# Patient Record
Sex: FEMALE | Race: WHITE | NOT HISPANIC OR LATINO | URBAN - NONMETROPOLITAN AREA
[De-identification: names, ages, dates, MRNs, and addresses within clinical notes are randomized per-mention and may not be internally consistent; named-entity substitution may affect disease eponyms.]

---

## 2019-01-10 ENCOUNTER — OFFICE (OUTPATIENT)
Dept: URBAN - NONMETROPOLITAN AREA CLINIC 13 | Facility: CLINIC | Age: 64
End: 2019-01-10
Payer: COMMERCIAL

## 2019-01-10 VITALS
DIASTOLIC BLOOD PRESSURE: 58 MMHG | WEIGHT: 193 LBS | HEART RATE: 62 BPM | OXYGEN SATURATION: 93 % | SYSTOLIC BLOOD PRESSURE: 118 MMHG | HEIGHT: 64 IN

## 2019-01-10 VITALS — HEIGHT: 64 IN

## 2019-01-10 DIAGNOSIS — K57.90 DIVERTICULOSIS OF INTESTINE, PART UNSPECIFIED, WITHOUT PERFO: ICD-10-CM

## 2019-01-10 DIAGNOSIS — K76.0 FATTY (CHANGE OF) LIVER, NOT ELSEWHERE CLASSIFIED: ICD-10-CM

## 2019-01-10 DIAGNOSIS — R93.3 ABNORMAL FINDINGS ON DIAGNOSTIC IMAGING OF OTHER PARTS OF DI: ICD-10-CM

## 2019-01-10 DIAGNOSIS — Z01.812 ENCOUNTER FOR PREPROCEDURAL LABORATORY EXAMINATION: ICD-10-CM

## 2019-01-10 LAB
CBC SYSMEX: HEMATOCRIT: 38.3 % (ref 37–47)
CBC SYSMEX: HEMOGLOBIN: 13.4 G/DL (ref 12–14)
CBC SYSMEX: LYMPHS %: 47.2 % — HIGH (ref 20.5–40.5)
CBC SYSMEX: LYMPHS ABSOLUTE: 3.9 10*3U/L — HIGH (ref 1.3–2.9)
CBC SYSMEX: MCH: 30.3 PG (ref 27–32)
CBC SYSMEX: MCHC: 35 G/DL (ref 28.5–35)
CBC SYSMEX: MCV: 86.7 FL (ref 84–100)
CBC SYSMEX: MONOS %: 7.3 % (ref 2–10)
CBC SYSMEX: MONOS ABSOLUTE: 0.6 10*3U/L (ref 0.3–3.8)
CBC SYSMEX: MPV: 10.8 FL (ref 8.3–11.9)
CBC SYSMEX: NEUT. %: 45.5 % (ref 43–67)
CBC SYSMEX: NEUT. ABSOLUTE: 3.7 10*3U/L (ref 2.2–4.8)
CBC SYSMEX: PLATELETS: 268 10*3U/L (ref 130–440)
CBC SYSMEX: RBC: 4.4 10*6U/L (ref 4.2–5.4)
CBC SYSMEX: RDW: 12.3 % (ref 11.5–15.5)
CBC SYSMEX: WBC: 8.2 10*3U/L (ref 4.5–10.5)
COMPLETE METABOLIC: ALBUMIN: 4.9 G/DL (ref 3.5–5.2)
COMPLETE METABOLIC: ALK. PHOS: 91 U/L (ref 40–150)
COMPLETE METABOLIC: ALT: 28 U/L (ref 0–55)
COMPLETE METABOLIC: AST: 22 U/L (ref 5–34)
COMPLETE METABOLIC: BUN: 18 MG/DL (ref 7–26)
COMPLETE METABOLIC: CALCIUM: 10.1 MG/DL (ref 8.4–10.3)
COMPLETE METABOLIC: CHLORIDE: 101 MMOL/L (ref 98–107)
COMPLETE METABOLIC: CO2: 28 MEQ/L (ref 22–29)
COMPLETE METABOLIC: CREATININE: 0.62 MG/DL (ref 0.57–1.25)
COMPLETE METABOLIC: GFR - AFRICAN AMERICAN: 125.6 (ref 59–200)
COMPLETE METABOLIC: GFR - NON AFRICAN AMERICAN: 103.7 (ref 59–200)
COMPLETE METABOLIC: GLUCOSE: 89 MG/DL (ref 70–99)
COMPLETE METABOLIC: POTASSIUM: 4.2 MMOL/L (ref 3.5–5.3)
COMPLETE METABOLIC: SODIUM: 141 MMOL/L (ref 136–145)
COMPLETE METABOLIC: TOTAL BILIRUBIN: 0.6 MG/DL (ref 0.2–1.2)
COMPLETE METABOLIC: TOTAL PROTEIN: 7.2 G/DL (ref 6.4–8.3)

## 2019-01-10 PROCEDURE — 80053 COMPREHEN METABOLIC PANEL: CPT | Performed by: NURSE PRACTITIONER

## 2019-01-10 PROCEDURE — 85025 COMPLETE CBC W/AUTO DIFF WBC: CPT | Performed by: NURSE PRACTITIONER

## 2019-01-10 PROCEDURE — 99203 OFFICE O/P NEW LOW 30 MIN: CPT | Performed by: NURSE PRACTITIONER

## 2019-01-10 PROCEDURE — 36415 COLL VENOUS BLD VENIPUNCTURE: CPT | Performed by: NURSE PRACTITIONER

## 2019-01-10 RX ORDER — VITAMIN E 268 MG
CAPSULE ORAL
Qty: 180 | Refills: 1 | Status: ACTIVE
Start: 2019-01-10

## 2019-01-10 RX ORDER — POLYETHYLENE GLYCOL 3350, SODIUM SULFATE ANHYDROUS, SODIUM BICARBONATE, SODIUM CHLORIDE, POTASSIUM CHLORIDE 236; 22.74; 6.74; 5.86; 2.97 G/4L; G/4L; G/4L; G/4L; G/4L
POWDER, FOR SOLUTION ORAL
Qty: 1 | Refills: 0 | Status: COMPLETED
Start: 2019-01-10 | End: 2019-01-25

## 2019-01-10 RX ORDER — BISACODYL 5 MG
TABLET, DELAYED RELEASE (ENTERIC COATED) ORAL
Qty: 8 | Refills: 0 | Status: COMPLETED
Start: 2019-01-10 | End: 2019-01-25

## 2019-01-10 RX ORDER — ONDANSETRON HYDROCHLORIDE 4 MG/1
TABLET, FILM COATED ORAL
Qty: 2 | Refills: 0 | Status: COMPLETED
Start: 2019-01-10 | End: 2019-01-25

## 2019-01-25 ENCOUNTER — OFFICE (OUTPATIENT)
Dept: URBAN - NONMETROPOLITAN AREA CLINIC 13 | Facility: CLINIC | Age: 64
End: 2019-01-25
Payer: COMMERCIAL

## 2019-01-25 ENCOUNTER — AMBULATORY SURGICAL CENTER (OUTPATIENT)
Dept: URBAN - NONMETROPOLITAN AREA SURGERY 2 | Facility: SURGERY | Age: 64
End: 2019-01-25

## 2019-01-25 VITALS
RESPIRATION RATE: 189 BRPM | OXYGEN SATURATION: 100 % | SYSTOLIC BLOOD PRESSURE: 147 MMHG | DIASTOLIC BLOOD PRESSURE: 69 MMHG | SYSTOLIC BLOOD PRESSURE: 136 MMHG | SYSTOLIC BLOOD PRESSURE: 116 MMHG | DIASTOLIC BLOOD PRESSURE: 81 MMHG | HEART RATE: 52 BPM | OXYGEN SATURATION: 98 % | DIASTOLIC BLOOD PRESSURE: 80 MMHG | OXYGEN SATURATION: 96 % | OXYGEN SATURATION: 94 % | DIASTOLIC BLOOD PRESSURE: 75 MMHG | HEART RATE: 55 BPM | SYSTOLIC BLOOD PRESSURE: 130 MMHG | OXYGEN SATURATION: 97 % | HEART RATE: 56 BPM | SYSTOLIC BLOOD PRESSURE: 126 MMHG | HEIGHT: 64 IN | OXYGEN SATURATION: 99 % | DIASTOLIC BLOOD PRESSURE: 70 MMHG | OXYGEN SATURATION: 92 % | SYSTOLIC BLOOD PRESSURE: 142 MMHG | HEART RATE: 50 BPM | DIASTOLIC BLOOD PRESSURE: 71 MMHG | DIASTOLIC BLOOD PRESSURE: 78 MMHG | HEART RATE: 54 BPM | SYSTOLIC BLOOD PRESSURE: 123 MMHG | HEART RATE: 58 BPM | SYSTOLIC BLOOD PRESSURE: 113 MMHG | DIASTOLIC BLOOD PRESSURE: 66 MMHG | RESPIRATION RATE: 20 BRPM | HEART RATE: 53 BPM | SYSTOLIC BLOOD PRESSURE: 129 MMHG | WEIGHT: 193 LBS | DIASTOLIC BLOOD PRESSURE: 91 MMHG | OXYGEN SATURATION: 95 % | DIASTOLIC BLOOD PRESSURE: 82 MMHG | RESPIRATION RATE: 18 BRPM

## 2019-01-25 DIAGNOSIS — D12.5 BENIGN NEOPLASM OF SIGMOID COLON: ICD-10-CM

## 2019-01-25 DIAGNOSIS — K63.5 POLYP OF COLON: ICD-10-CM

## 2019-01-25 DIAGNOSIS — Z12.11 ENCOUNTER FOR SCREENING FOR MALIGNANT NEOPLASM OF COLON: ICD-10-CM

## 2019-01-25 PROBLEM — K64.1 SECOND DEGREE HEMORRHOIDS: Status: ACTIVE | Noted: 2019-01-25

## 2019-01-25 PROBLEM — K57.30 DVRTCLOS OF LG INT W/O PERFORATION OR ABSCESS W/O BLEEDING: Status: ACTIVE | Noted: 2019-01-25

## 2019-01-25 PROCEDURE — 88305 TISSUE EXAM BY PATHOLOGIST: CPT | Mod: TC | Performed by: INTERNAL MEDICINE

## 2019-01-25 PROCEDURE — 45380 COLONOSCOPY AND BIOPSY: CPT | Mod: PT | Performed by: INTERNAL MEDICINE

## 2019-01-30 LAB — SURGICAL PROCEDURE: PDFREPORT1: (no result)

## 2019-05-24 ENCOUNTER — OFFICE (OUTPATIENT)
Dept: URBAN - NONMETROPOLITAN AREA CLINIC 13 | Facility: CLINIC | Age: 64
End: 2019-05-24
Payer: COMMERCIAL

## 2019-05-24 VITALS
OXYGEN SATURATION: 96 % | HEIGHT: 64 IN | SYSTOLIC BLOOD PRESSURE: 126 MMHG | DIASTOLIC BLOOD PRESSURE: 78 MMHG | HEART RATE: 51 BPM | WEIGHT: 186 LBS

## 2019-05-24 VITALS — HEIGHT: 64 IN

## 2019-05-24 DIAGNOSIS — K76.0 FATTY (CHANGE OF) LIVER, NOT ELSEWHERE CLASSIFIED: ICD-10-CM

## 2019-05-24 DIAGNOSIS — K57.90 DIVERTICULOSIS OF INTESTINE, PART UNSPECIFIED, WITHOUT PERFO: ICD-10-CM

## 2019-05-24 PROCEDURE — 93976 VASCULAR STUDY: CPT | Mod: TC,59 | Performed by: NURSE PRACTITIONER

## 2019-05-24 PROCEDURE — 93976 VASCULAR STUDY: CPT | Mod: 59,TC | Performed by: NURSE PRACTITIONER

## 2019-05-24 PROCEDURE — 99213 OFFICE O/P EST LOW 20 MIN: CPT | Mod: 25 | Performed by: NURSE PRACTITIONER

## 2019-05-24 PROCEDURE — 76705 ECHO EXAM OF ABDOMEN: CPT | Mod: TC | Performed by: NURSE PRACTITIONER

## 2019-06-07 ENCOUNTER — OFFICE (OUTPATIENT)
Dept: URBAN - NONMETROPOLITAN AREA CLINIC 13 | Facility: CLINIC | Age: 64
End: 2019-06-07

## 2019-06-07 ENCOUNTER — OFFICE (OUTPATIENT)
Dept: URBAN - NONMETROPOLITAN AREA CLINIC 13 | Facility: CLINIC | Age: 64
End: 2019-06-07
Payer: COMMERCIAL

## 2019-06-07 VITALS — HEIGHT: 64 IN

## 2019-06-07 DIAGNOSIS — Z01.812 ENCOUNTER FOR PREPROCEDURAL LABORATORY EXAMINATION: ICD-10-CM

## 2019-06-07 DIAGNOSIS — K76.0 FATTY (CHANGE OF) LIVER, NOT ELSEWHERE CLASSIFIED: ICD-10-CM

## 2019-06-07 PROCEDURE — 36415 COLL VENOUS BLD VENIPUNCTURE: CPT | Performed by: NURSE PRACTITIONER

## 2019-06-07 PROCEDURE — 82565 ASSAY OF CREATININE: CPT | Performed by: NURSE PRACTITIONER

## 2019-06-07 PROCEDURE — 84520 ASSAY OF UREA NITROGEN: CPT | Performed by: NURSE PRACTITIONER

## 2019-06-07 PROCEDURE — 74170 CT ABD WO CNTRST FLWD CNTRST: CPT | Mod: TC | Performed by: NURSE PRACTITIONER

## 2019-11-26 ENCOUNTER — OFFICE (OUTPATIENT)
Dept: URBAN - NONMETROPOLITAN AREA CLINIC 13 | Facility: CLINIC | Age: 64
End: 2019-11-26

## 2019-11-26 VITALS
HEART RATE: 55 BPM | DIASTOLIC BLOOD PRESSURE: 79 MMHG | OXYGEN SATURATION: 95 % | DIASTOLIC BLOOD PRESSURE: 96 MMHG | WEIGHT: 190 LBS | SYSTOLIC BLOOD PRESSURE: 165 MMHG | HEIGHT: 64 IN | RESPIRATION RATE: 18 BRPM | SYSTOLIC BLOOD PRESSURE: 155 MMHG

## 2019-11-26 DIAGNOSIS — K57.90 DIVERTICULOSIS OF INTESTINE, PART UNSPECIFIED, WITHOUT PERFO: ICD-10-CM

## 2019-11-26 DIAGNOSIS — K76.0 FATTY (CHANGE OF) LIVER, NOT ELSEWHERE CLASSIFIED: ICD-10-CM

## 2019-11-26 PROCEDURE — 99213 OFFICE O/P EST LOW 20 MIN: CPT | Performed by: NURSE PRACTITIONER

## 2020-06-17 ENCOUNTER — OFFICE (OUTPATIENT)
Dept: URBAN - NONMETROPOLITAN AREA CLINIC 13 | Facility: CLINIC | Age: 65
End: 2020-06-17

## 2020-06-17 ENCOUNTER — OFFICE (OUTPATIENT)
Dept: URBAN - NONMETROPOLITAN AREA CLINIC 13 | Facility: CLINIC | Age: 65
End: 2020-06-17
Payer: COMMERCIAL

## 2020-06-17 VITALS
WEIGHT: 195 LBS | HEART RATE: 61 BPM | HEIGHT: 64 IN | OXYGEN SATURATION: 95 % | SYSTOLIC BLOOD PRESSURE: 140 MMHG | DIASTOLIC BLOOD PRESSURE: 85 MMHG

## 2020-06-17 VITALS — HEIGHT: 64 IN

## 2020-06-17 DIAGNOSIS — K76.0 FATTY (CHANGE OF) LIVER, NOT ELSEWHERE CLASSIFIED: ICD-10-CM

## 2020-06-17 PROCEDURE — 76705 ECHO EXAM OF ABDOMEN: CPT | Mod: TC | Performed by: NURSE PRACTITIONER

## 2020-06-17 PROCEDURE — 99213 OFFICE O/P EST LOW 20 MIN: CPT | Mod: 25 | Performed by: NURSE PRACTITIONER

## 2020-12-22 ENCOUNTER — HOSPITAL ENCOUNTER (EMERGENCY)
Dept: HOSPITAL 47 - EC | Age: 65
Discharge: HOME | End: 2020-12-22
Payer: MEDICARE

## 2020-12-22 VITALS — TEMPERATURE: 98.3 F | RESPIRATION RATE: 18 BRPM

## 2020-12-22 VITALS — DIASTOLIC BLOOD PRESSURE: 100 MMHG | SYSTOLIC BLOOD PRESSURE: 144 MMHG | HEART RATE: 80 BPM

## 2020-12-22 DIAGNOSIS — I48.91: ICD-10-CM

## 2020-12-22 DIAGNOSIS — Z87.891: ICD-10-CM

## 2020-12-22 DIAGNOSIS — Z88.5: ICD-10-CM

## 2020-12-22 DIAGNOSIS — Z88.0: ICD-10-CM

## 2020-12-22 DIAGNOSIS — Z79.51: ICD-10-CM

## 2020-12-22 DIAGNOSIS — J44.9: ICD-10-CM

## 2020-12-22 DIAGNOSIS — R10.9: ICD-10-CM

## 2020-12-22 DIAGNOSIS — R11.2: Primary | ICD-10-CM

## 2020-12-22 DIAGNOSIS — Z79.899: ICD-10-CM

## 2020-12-22 DIAGNOSIS — Z88.1: ICD-10-CM

## 2020-12-22 LAB
ALBUMIN SERPL-MCNC: 4.7 G/DL (ref 3.5–5)
ALP SERPL-CCNC: 75 U/L (ref 38–126)
ALT SERPL-CCNC: 24 U/L (ref 4–34)
AMYLASE SERPL-CCNC: 57 U/L (ref 30–110)
ANION GAP SERPL CALC-SCNC: 10 MMOL/L
AST SERPL-CCNC: 29 U/L (ref 14–36)
BASOPHILS # BLD AUTO: 0.1 K/UL (ref 0–0.2)
BASOPHILS NFR BLD AUTO: 1 %
BUN SERPL-SCNC: 10 MG/DL (ref 7–17)
CALCIUM SPEC-MCNC: 9.7 MG/DL (ref 8.4–10.2)
CHLORIDE SERPL-SCNC: 107 MMOL/L (ref 98–107)
CO2 SERPL-SCNC: 23 MMOL/L (ref 22–30)
EOSINOPHIL # BLD AUTO: 0.1 K/UL (ref 0–0.7)
EOSINOPHIL NFR BLD AUTO: 1 %
ERYTHROCYTE [DISTWIDTH] IN BLOOD BY AUTOMATED COUNT: 5.88 M/UL (ref 3.8–5.4)
ERYTHROCYTE [DISTWIDTH] IN BLOOD: 13.9 % (ref 11.5–15.5)
GLUCOSE SERPL-MCNC: 147 MG/DL (ref 74–99)
HCT VFR BLD AUTO: 56.2 % (ref 34–46)
HGB BLD-MCNC: 18.5 GM/DL (ref 11.4–16)
LIPASE SERPL-CCNC: 92 U/L (ref 23–300)
LYMPHOCYTES # SPEC AUTO: 1.2 K/UL (ref 1–4.8)
LYMPHOCYTES NFR SPEC AUTO: 14 %
MCH RBC QN AUTO: 31.5 PG (ref 25–35)
MCHC RBC AUTO-ENTMCNC: 33 G/DL (ref 31–37)
MCV RBC AUTO: 95.7 FL (ref 80–100)
MONOCYTES # BLD AUTO: 0.2 K/UL (ref 0–1)
MONOCYTES NFR BLD AUTO: 2 %
NEUTROPHILS # BLD AUTO: 7.2 K/UL (ref 1.3–7.7)
NEUTROPHILS NFR BLD AUTO: 82 %
PLATELET # BLD AUTO: 187 K/UL (ref 150–450)
POTASSIUM SERPL-SCNC: 4.5 MMOL/L (ref 3.5–5.1)
PROT SERPL-MCNC: 8.2 G/DL (ref 6.3–8.2)
SODIUM SERPL-SCNC: 140 MMOL/L (ref 137–145)
WBC # BLD AUTO: 8.8 K/UL (ref 3.8–10.6)

## 2020-12-22 PROCEDURE — 99284 EMERGENCY DEPT VISIT MOD MDM: CPT

## 2020-12-22 PROCEDURE — 80053 COMPREHEN METABOLIC PANEL: CPT

## 2020-12-22 PROCEDURE — 96375 TX/PRO/DX INJ NEW DRUG ADDON: CPT

## 2020-12-22 PROCEDURE — 93005 ELECTROCARDIOGRAM TRACING: CPT

## 2020-12-22 PROCEDURE — 82150 ASSAY OF AMYLASE: CPT

## 2020-12-22 PROCEDURE — 84484 ASSAY OF TROPONIN QUANT: CPT

## 2020-12-22 PROCEDURE — 85025 COMPLETE CBC W/AUTO DIFF WBC: CPT

## 2020-12-22 PROCEDURE — 96361 HYDRATE IV INFUSION ADD-ON: CPT

## 2020-12-22 PROCEDURE — 83605 ASSAY OF LACTIC ACID: CPT

## 2020-12-22 PROCEDURE — 83690 ASSAY OF LIPASE: CPT

## 2020-12-22 PROCEDURE — 96374 THER/PROPH/DIAG INJ IV PUSH: CPT

## 2020-12-22 NOTE — ED
Nausea/Vomiting/Diarrhea HPI





- General


Chief complaint: Nausea/Vomiting/Diarrhea


Stated complaint: Vomiting/SOB/Adb pain


Time Seen by Provider: 12/22/20 09:27


Source: patient, RN notes reviewed


Mode of arrival: wheelchair


Limitations: no limitations





- History of Present Illness


Initial comments: 





This a 65-year-old female presents emergency Department chief complaint 

abdominal pain, nausea vomiting.  Patient states that she started vomiting 

throughout the night.  Patient has this recurrent and states that she's had 

multiple evaluations including several CAT scans yearly, EGDs.  Patient states 

that she's been diagnosed with gastritis she takes omeprazole and Carafate.  She

states occasionally does accept she has come emergency department.  Denies any 

diarrhea constipation she has epigastric pain no chest pain she states she has 

chronic shortness of breath secondary to COPD, heavy smoking but does not have 

any increasing symptoms.  No headache no dizziness.  Patient has a back pain.





- Related Data


                                Home Medications











 Medication  Instructions  Recorded  Confirmed


 


Albuterol Sulfate [Ventolin HFA] 1 - 2 puff INHALATION RT-Q6H PRN 12/22/20 12/22/20


 


Digoxin 250 mcg PO DAILY 12/22/20 12/22/20


 


Ipratropium/Albuterol Sulfate 1 puff INHALATION RT-QID 12/22/20 12/22/20





[Combivent Respimat Inhaler]   


 


Mirtazapine [Remeron] 30 mg PO DAILY 12/22/20 12/22/20


 


Pantoprazole Sodium [Protonix] 40 mg PO DAILY 12/22/20 12/22/20


 


Rosuvastatin [Crestor] 20 mg PO DAILY 12/22/20 12/22/20


 


busPIRone HCl [Buspar] 10 mg PO TID 12/22/20 12/22/20








                                  Previous Rx's











 Medication  Instructions  Recorded


 


Ondansetron Odt [Zofran Odt] 4 mg PO Q8HR PRN #10 tab 12/22/20


 


Pantoprazole [Protonix] 40 mg PO DAILY #14 tablet. 12/22/20


 


Sucralfate [Carafate] 1 gm PO BID #28 tablet 12/22/20











                                    Allergies











Allergy/AdvReac Type Severity Reaction Status Date / Time


 


amoxicillin [From Augmentin] Allergy  Unknown Verified 12/22/20 10:18


 


clavulanic acid Allergy  Unknown Verified 12/22/20 10:18





[From Augmentin]     


 


codeine Allergy  Unknown Verified 12/22/20 10:18


 


Tetracyclines Allergy  Unknown Verified 12/22/20 10:18














Review of Systems


ROS Statement: 


Those systems with pertinent positive or pertinent negative responses have been 

documented in the HPI.





ROS Other: All systems not noted in ROS Statement are negative.





Past Medical History


Past Medical History: Atrial Fibrillation, COPD


History of Any Multi-Drug Resistant Organisms: None Reported


Past Psychological History: No Psychological Hx Reported


Smoking Status: Former smoker


Past Alcohol Use History: None Reported


Past Drug Use History: None Reported





General Exam


Limitations: no limitations


General appearance: alert, in no apparent distress


Head exam: Present: atraumatic, normocephalic, normal inspection


Eye exam: Present: normal appearance, PERRL, EOMI.  Absent: scleral icterus, 

conjunctival injection, periorbital swelling


ENT exam: Present: normal exam, normal oropharynx, mucous membranes moist


Neck exam: Present: normal inspection, full ROM.  Absent: tenderness, 

meningismus, lymphadenopathy


Respiratory exam: Present: normal lung sounds bilaterally.  Absent: respiratory 

distress, wheezes, rales, rhonchi, stridor


Cardiovascular Exam: Present: regular rate, normal rhythm, normal heart sounds. 

Absent: systolic murmur, diastolic murmur, rubs, gallop, clicks


GI/Abdominal exam: Present: soft, tenderness (Moderate epigastric), normal bowel

sounds.  Absent: distended, guarding, rebound, rigid


Back exam: Absent: CVA tenderness (R), CVA tenderness (L)


Neurological exam: Present: alert, oriented X3


Skin exam: Present: warm, dry, intact, normal color.  Absent: rash





Course


                                   Vital Signs











  12/22/20 12/22/20 12/22/20





  09:21 11:15 11:57


 


Temperature 98.1 F 98.3 F 


 


Pulse Rate 66 68 72


 


Respiratory 20 18 18





Rate   


 


Blood Pressure 207/90 191/115 199/96


 


O2 Sat by Pulse 95 96 95





Oximetry   














  12/22/20





  12:21


 


Temperature 


 


Pulse Rate 70


 


Respiratory 18





Rate 


 


Blood Pressure 190/100


 


O2 Sat by Pulse 95





Oximetry 














- Reevaluation(s)


Reevaluation #1: 





12/22/20 11:10


Patient reevaluated resting comfortably, nausea has improved she states she 

still has some mild stomach burning.





Medical Decision Making





- Medical Decision Making





Patient reevaluated updated and results patient was sleeping in the room upon 

arrival.  Patient states her nausea is improved.  She has minimal abdominal d

iscomfort which is nontender to palpation.  Patient's labs reviewed no 

significant abnormality.  Patient was well hydrated and given antiemetics.  

Patient has chronic stomach and gastritis issues.  Patient discharged in stable 

condition return parameters were discussed.





- Lab Data


Result diagrams: 


                                 12/22/20 09:39





                                 12/22/20 09:39


                                   Lab Results











  12/22/20 12/22/20 12/22/20 Range/Units





  09:39 09:39 09:39 


 


WBC  8.8    (3.8-10.6)  k/uL


 


RBC  5.88 H    (3.80-5.40)  m/uL


 


Hgb  18.5 H    (11.4-16.0)  gm/dL


 


Hct  56.2 H    (34.0-46.0)  %


 


MCV  95.7    (80.0-100.0)  fL


 


MCH  31.5    (25.0-35.0)  pg


 


MCHC  33.0    (31.0-37.0)  g/dL


 


RDW  13.9    (11.5-15.5)  %


 


Plt Count  187    (150-450)  k/uL


 


MPV  10.1    


 


Neutrophils %  82    %


 


Lymphocytes %  14    %


 


Monocytes %  2    %


 


Eosinophils %  1    %


 


Basophils %  1    %


 


Neutrophils #  7.2    (1.3-7.7)  k/uL


 


Lymphocytes #  1.2    (1.0-4.8)  k/uL


 


Monocytes #  0.2    (0-1.0)  k/uL


 


Eosinophils #  0.1    (0-0.7)  k/uL


 


Basophils #  0.1    (0-0.2)  k/uL


 


Sodium   140   (137-145)  mmol/L


 


Potassium   4.5   (3.5-5.1)  mmol/L


 


Chloride   107   ()  mmol/L


 


Carbon Dioxide   23   (22-30)  mmol/L


 


Anion Gap   10   mmol/L


 


BUN   10   (7-17)  mg/dL


 


Creatinine   0.62   (0.52-1.04)  mg/dL


 


Est GFR (CKD-EPI)AfAm   >90   (>60 ml/min/1.73 sqM)  


 


Est GFR (CKD-EPI)NonAf   >90   (>60 ml/min/1.73 sqM)  


 


Glucose   147 H   (74-99)  mg/dL


 


Plasma Lactic Acid Jairo    2.4 H*  (0.7-2.0)  mmol/L


 


Calcium   9.7   (8.4-10.2)  mg/dL


 


Total Bilirubin   0.7   (0.2-1.3)  mg/dL


 


AST   29   (14-36)  U/L


 


ALT   24   (4-34)  U/L


 


Alkaline Phosphatase   75   ()  U/L


 


Troponin I     (0.000-0.034)  ng/mL


 


Total Protein   8.2   (6.3-8.2)  g/dL


 


Albumin   4.7   (3.5-5.0)  g/dL


 


Amylase   57   ()  U/L


 


Lipase   92   ()  U/L














  12/22/20 Range/Units





  09:39 


 


WBC   (3.8-10.6)  k/uL


 


RBC   (3.80-5.40)  m/uL


 


Hgb   (11.4-16.0)  gm/dL


 


Hct   (34.0-46.0)  %


 


MCV   (80.0-100.0)  fL


 


MCH   (25.0-35.0)  pg


 


MCHC   (31.0-37.0)  g/dL


 


RDW   (11.5-15.5)  %


 


Plt Count   (150-450)  k/uL


 


MPV   


 


Neutrophils %   %


 


Lymphocytes %   %


 


Monocytes %   %


 


Eosinophils %   %


 


Basophils %   %


 


Neutrophils #   (1.3-7.7)  k/uL


 


Lymphocytes #   (1.0-4.8)  k/uL


 


Monocytes #   (0-1.0)  k/uL


 


Eosinophils #   (0-0.7)  k/uL


 


Basophils #   (0-0.2)  k/uL


 


Sodium   (137-145)  mmol/L


 


Potassium   (3.5-5.1)  mmol/L


 


Chloride   ()  mmol/L


 


Carbon Dioxide   (22-30)  mmol/L


 


Anion Gap   mmol/L


 


BUN   (7-17)  mg/dL


 


Creatinine   (0.52-1.04)  mg/dL


 


Est GFR (CKD-EPI)AfAm   (>60 ml/min/1.73 sqM)  


 


Est GFR (CKD-EPI)NonAf   (>60 ml/min/1.73 sqM)  


 


Glucose   (74-99)  mg/dL


 


Plasma Lactic Acid Jairo   (0.7-2.0)  mmol/L


 


Calcium   (8.4-10.2)  mg/dL


 


Total Bilirubin   (0.2-1.3)  mg/dL


 


AST   (14-36)  U/L


 


ALT   (4-34)  U/L


 


Alkaline Phosphatase   ()  U/L


 


Troponin I  <0.012  (0.000-0.034)  ng/mL


 


Total Protein   (6.3-8.2)  g/dL


 


Albumin   (3.5-5.0)  g/dL


 


Amylase   ()  U/L


 


Lipase   ()  U/L














Disposition


Clinical Impression: 


 Abdominal pain, Nausea & vomiting





Disposition: HOME SELF-CARE


Condition: Stable


Instructions (If sedation given, give patient instructions):  Acute Nausea and 

Vomiting (ED)


Additional Instructions: 


Please return to the Emergency Department if symptoms worsen or any other 

concerns.


Prescriptions: 


Sucralfate [Carafate] 1 gm PO BID #28 tablet


Pantoprazole [Protonix] 40 mg PO DAILY #14 tablet.


Ondansetron Odt [Zofran Odt] 4 mg PO Q8HR PRN #10 tab


 PRN Reason: Nausea


Is patient prescribed a controlled substance at d/c from ED?: No


Referrals: 


Nonstaff,Physician [Primary Care Provider] - 1-2 days


Time of Disposition: 12:34

## 2021-01-22 ENCOUNTER — OFFICE (OUTPATIENT)
Dept: URBAN - NONMETROPOLITAN AREA CLINIC 13 | Facility: CLINIC | Age: 66
End: 2021-01-22

## 2021-01-22 VITALS
HEIGHT: 64 IN | OXYGEN SATURATION: 97 % | WEIGHT: 190 LBS | DIASTOLIC BLOOD PRESSURE: 74 MMHG | HEART RATE: 59 BPM | SYSTOLIC BLOOD PRESSURE: 136 MMHG

## 2021-01-22 DIAGNOSIS — K76.0 FATTY (CHANGE OF) LIVER, NOT ELSEWHERE CLASSIFIED: ICD-10-CM

## 2021-01-22 PROCEDURE — 99212 OFFICE O/P EST SF 10 MIN: CPT | Performed by: NURSE PRACTITIONER

## 2021-06-04 ENCOUNTER — OFFICE (OUTPATIENT)
Dept: URBAN - NONMETROPOLITAN AREA CLINIC 13 | Facility: CLINIC | Age: 66
End: 2021-06-04
Payer: COMMERCIAL

## 2021-06-04 ENCOUNTER — OFFICE (OUTPATIENT)
Dept: URBAN - NONMETROPOLITAN AREA CLINIC 13 | Facility: CLINIC | Age: 66
End: 2021-06-04

## 2021-06-04 VITALS
WEIGHT: 182 LBS | HEIGHT: 64 IN | HEART RATE: 56 BPM | OXYGEN SATURATION: 96 % | SYSTOLIC BLOOD PRESSURE: 120 MMHG | DIASTOLIC BLOOD PRESSURE: 74 MMHG

## 2021-06-04 VITALS — HEIGHT: 64 IN

## 2021-06-04 DIAGNOSIS — K76.0 FATTY (CHANGE OF) LIVER, NOT ELSEWHERE CLASSIFIED: ICD-10-CM

## 2021-06-04 PROCEDURE — 99213 OFFICE O/P EST LOW 20 MIN: CPT | Mod: 25 | Performed by: NURSE PRACTITIONER

## 2021-06-04 PROCEDURE — 76705 ECHO EXAM OF ABDOMEN: CPT | Mod: TC | Performed by: NURSE PRACTITIONER

## 2021-11-27 ENCOUNTER — HOSPITAL ENCOUNTER (INPATIENT)
Dept: HOSPITAL 47 - EC | Age: 66
LOS: 4 days | Discharge: HOME | DRG: 177 | End: 2021-12-01
Attending: INTERNAL MEDICINE | Admitting: INTERNAL MEDICINE
Payer: MEDICARE

## 2021-11-27 DIAGNOSIS — Z82.49: ICD-10-CM

## 2021-11-27 DIAGNOSIS — M54.9: ICD-10-CM

## 2021-11-27 DIAGNOSIS — J44.1: ICD-10-CM

## 2021-11-27 DIAGNOSIS — I42.9: ICD-10-CM

## 2021-11-27 DIAGNOSIS — J96.01: ICD-10-CM

## 2021-11-27 DIAGNOSIS — F17.200: ICD-10-CM

## 2021-11-27 DIAGNOSIS — I48.0: ICD-10-CM

## 2021-11-27 DIAGNOSIS — D75.1: ICD-10-CM

## 2021-11-27 DIAGNOSIS — U07.1: Primary | ICD-10-CM

## 2021-11-27 DIAGNOSIS — I48.20: ICD-10-CM

## 2021-11-27 DIAGNOSIS — J44.0: ICD-10-CM

## 2021-11-27 DIAGNOSIS — J12.82: ICD-10-CM

## 2021-11-27 DIAGNOSIS — Z79.899: ICD-10-CM

## 2021-11-27 DIAGNOSIS — R30.9: ICD-10-CM

## 2021-11-27 DIAGNOSIS — E11.9: ICD-10-CM

## 2021-11-27 DIAGNOSIS — R53.83: ICD-10-CM

## 2021-11-27 DIAGNOSIS — I50.31: ICD-10-CM

## 2021-11-27 DIAGNOSIS — E87.1: ICD-10-CM

## 2021-11-27 DIAGNOSIS — D69.6: ICD-10-CM

## 2021-11-27 DIAGNOSIS — K21.9: ICD-10-CM

## 2021-11-27 DIAGNOSIS — I48.21: ICD-10-CM

## 2021-11-27 DIAGNOSIS — J98.11: ICD-10-CM

## 2021-11-27 DIAGNOSIS — I11.0: ICD-10-CM

## 2021-11-27 DIAGNOSIS — Z79.01: ICD-10-CM

## 2021-11-27 LAB
ALBUMIN SERPL-MCNC: 3.6 G/DL (ref 3.5–5)
ALP SERPL-CCNC: 75 U/L (ref 38–126)
ALT SERPL-CCNC: 30 U/L (ref 4–34)
ANION GAP SERPL CALC-SCNC: 8 MMOL/L
APTT BLD: 28.9 SEC (ref 22–30)
AST SERPL-CCNC: 75 U/L (ref 14–36)
BASOPHILS # BLD AUTO: 0 K/UL (ref 0–0.2)
BASOPHILS NFR BLD AUTO: 1 %
BUN SERPL-SCNC: 14 MG/DL (ref 7–17)
CALCIUM SPEC-MCNC: 8.3 MG/DL (ref 8.4–10.2)
CHLORIDE SERPL-SCNC: 103 MMOL/L (ref 98–107)
CO2 SERPL-SCNC: 21 MMOL/L (ref 22–30)
EOSINOPHIL # BLD AUTO: 0 K/UL (ref 0–0.7)
EOSINOPHIL NFR BLD AUTO: 0 %
ERYTHROCYTE [DISTWIDTH] IN BLOOD BY AUTOMATED COUNT: 6.03 M/UL (ref 3.8–5.4)
ERYTHROCYTE [DISTWIDTH] IN BLOOD: 16 % (ref 11.5–15.5)
FIBRINOGEN PPP-MCNC: 395 MG/DL (ref 200–500)
GLUCOSE SERPL-MCNC: 111 MG/DL (ref 74–99)
HCT VFR BLD AUTO: 53.8 % (ref 34–46)
HGB BLD-MCNC: 18.6 GM/DL (ref 11.4–16)
INR PPP: 1.1 (ref ?–1.2)
LDH SPEC-CCNC: 793 U/L (ref 313–618)
LYMPHOCYTES # SPEC AUTO: 1.1 K/UL (ref 1–4.8)
LYMPHOCYTES NFR SPEC AUTO: 18 %
MCH RBC QN AUTO: 30.9 PG (ref 25–35)
MCHC RBC AUTO-ENTMCNC: 34.6 G/DL (ref 31–37)
MCV RBC AUTO: 89.2 FL (ref 80–100)
MONOCYTES # BLD AUTO: 0.4 K/UL (ref 0–1)
MONOCYTES NFR BLD AUTO: 7 %
NEUTROPHILS # BLD AUTO: 4.6 K/UL (ref 1.3–7.7)
NEUTROPHILS NFR BLD AUTO: 73 %
PLATELET # BLD AUTO: 112 K/UL (ref 150–450)
POTASSIUM SERPL-SCNC: 4.9 MMOL/L (ref 3.5–5.1)
PROT SERPL-MCNC: 7.2 G/DL (ref 6.3–8.2)
PT BLD: 11.4 SEC (ref 9–12)
SODIUM SERPL-SCNC: 132 MMOL/L (ref 137–145)
WBC # BLD AUTO: 6.2 K/UL (ref 3.8–10.6)

## 2021-11-27 PROCEDURE — 96374 THER/PROPH/DIAG INJ IV PUSH: CPT

## 2021-11-27 PROCEDURE — 85730 THROMBOPLASTIN TIME PARTIAL: CPT

## 2021-11-27 PROCEDURE — 71045 X-RAY EXAM CHEST 1 VIEW: CPT

## 2021-11-27 PROCEDURE — 82728 ASSAY OF FERRITIN: CPT

## 2021-11-27 PROCEDURE — 94640 AIRWAY INHALATION TREATMENT: CPT

## 2021-11-27 PROCEDURE — 81003 URINALYSIS AUTO W/O SCOPE: CPT

## 2021-11-27 PROCEDURE — 83615 LACTATE (LD) (LDH) ENZYME: CPT

## 2021-11-27 PROCEDURE — 36415 COLL VENOUS BLD VENIPUNCTURE: CPT

## 2021-11-27 PROCEDURE — 93306 TTE W/DOPPLER COMPLETE: CPT

## 2021-11-27 PROCEDURE — 84484 ASSAY OF TROPONIN QUANT: CPT

## 2021-11-27 PROCEDURE — 80048 BASIC METABOLIC PNL TOTAL CA: CPT

## 2021-11-27 PROCEDURE — 85025 COMPLETE CBC W/AUTO DIFF WBC: CPT

## 2021-11-27 PROCEDURE — 85384 FIBRINOGEN ACTIVITY: CPT

## 2021-11-27 PROCEDURE — 87635 SARS-COV-2 COVID-19 AMP PRB: CPT

## 2021-11-27 PROCEDURE — 85379 FIBRIN DEGRADATION QUANT: CPT

## 2021-11-27 PROCEDURE — 85610 PROTHROMBIN TIME: CPT

## 2021-11-27 PROCEDURE — 71046 X-RAY EXAM CHEST 2 VIEWS: CPT

## 2021-11-27 PROCEDURE — 99285 EMERGENCY DEPT VISIT HI MDM: CPT

## 2021-11-27 PROCEDURE — 80076 HEPATIC FUNCTION PANEL: CPT

## 2021-11-27 PROCEDURE — 93005 ELECTROCARDIOGRAM TRACING: CPT

## 2021-11-27 PROCEDURE — 80053 COMPREHEN METABOLIC PANEL: CPT

## 2021-11-27 PROCEDURE — 96375 TX/PRO/DX INJ NEW DRUG ADDON: CPT

## 2021-11-27 PROCEDURE — 83735 ASSAY OF MAGNESIUM: CPT

## 2021-11-27 PROCEDURE — 86140 C-REACTIVE PROTEIN: CPT

## 2021-11-27 PROCEDURE — 94760 N-INVAS EAR/PLS OXIMETRY 1: CPT

## 2021-11-27 PROCEDURE — 83605 ASSAY OF LACTIC ACID: CPT

## 2021-11-27 PROCEDURE — 83880 ASSAY OF NATRIURETIC PEPTIDE: CPT

## 2021-11-27 RX ADMIN — CEFAZOLIN SCH MLS/HR: 330 INJECTION, POWDER, FOR SOLUTION INTRAMUSCULAR; INTRAVENOUS at 18:41

## 2021-11-27 NOTE — ED
SOB HPI





- General


Chief Complaint: Shortness of Breath


Stated Complaint: YAYO


Time Seen by Provider: 11/27/21 15:07


Source: patient


Mode of arrival: wheelchair


Limitations: no limitations





- History of Present Illness


Initial Comments: 





66-year-old female test with history of COPD not on home O2, A. fib presents to 

the emergency room with reported shortness of breath.  States has been getting 

worse over the past 2 days.  States she does not live in the area.  She lives 

with her son and any time she comes to visit she thinks a temperature change 

exacerbates her breathing.  She has been using her nebulizer and inhaler as 

directed without any improvement in her symptoms.  Admits that she has "lung 

pain".  Patient denies a history of coronary disease or congestive heart 

failure.  No lower extremity edema.  No history of DVT or PE.  No calf pain or 

swelling.  Denies fevers.  Does admit to a chronic cough which has not been any 

worse.  Patient is not vaccinated against Covid.  Denies any known exposures.  

Does not see a pulmonologist.  Normally is seen in the emergency department for 

times per year to receive antibiotics and steroids.  She has never been on life 

support.  No other alleviating, precipitating modifying factors





- Related Data


                                Home Medications











 Medication  Instructions  Recorded  Confirmed


 


Albuterol Sulfate [Ventolin HFA] 1 - 2 puff INHALATION RT-Q6H PRN 12/22/20 12/22/20


 


Digoxin 250 mcg PO DAILY 12/22/20 12/22/20


 


Ipratropium/Albuterol Sulfate 1 puff INHALATION RT-QID 12/22/20 12/22/20





[Combivent Respimat Inhaler]   


 


Mirtazapine [Remeron] 30 mg PO DAILY 12/22/20 12/22/20


 


Pantoprazole Sodium [Protonix] 40 mg PO DAILY 12/22/20 12/22/20


 


Rosuvastatin [Crestor] 20 mg PO DAILY 12/22/20 12/22/20


 


busPIRone HCl [Buspar] 10 mg PO TID 12/22/20 12/22/20








                                  Previous Rx's











 Medication  Instructions  Recorded


 


Ondansetron Odt [Zofran Odt] 4 mg PO Q8HR PRN #10 tab 12/22/20


 


Pantoprazole [Protonix] 40 mg PO DAILY #14 tablet 12/22/20


 


Sucralfate [Carafate] 1 gm PO BID #28 tablet 12/22/20











                                    Allergies











Allergy/AdvReac Type Severity Reaction Status Date / Time


 


amoxicillin [From Augmentin] Allergy  Unknown Verified 11/27/21 14:56


 


clavulanic acid Allergy  Unknown Verified 11/27/21 14:56





[From Augmentin]     


 


codeine Allergy  Unknown Verified 11/27/21 14:56


 


Tetracyclines Allergy  Unknown Verified 11/27/21 14:56














Review of Systems


ROS Statement: 


Those systems with pertinent positive or pertinent negative responses have been 

documented in the HPI.





ROS Other: All systems not noted in ROS Statement are negative.





Past Medical History


Past Medical History: Atrial Fibrillation, COPD


History of Any Multi-Drug Resistant Organisms: None Reported


Past Surgical History: Ablation


Past Psychological History: No Psychological Hx Reported


Smoking Status: Former smoker


Past Alcohol Use History: None Reported


Past Drug Use History: None Reported





General Exam


Limitations: no limitations





Course


                                   Vital Signs











  11/27/21 11/27/21 11/27/21





  14:51 15:15 15:41


 


Temperature 98.6 F  


 


Pulse Rate 102 H  


 


Respiratory 24  





Rate   


 


Blood Pressure 113/71  


 


O2 Sat by Pulse 92 L 97 95





Oximetry   














  11/27/21 11/27/21 11/27/21





  16:00 16:30 16:50


 


Temperature   


 


Pulse Rate 81 80 


 


Respiratory 20 20 





Rate   


 


Blood Pressure 106/80 114/77 


 


O2 Sat by Pulse 91 L 91 L 96





Oximetry   














Medical Decision Making





- Medical Decision Making





On arrival patient is placed into room 2.  A thorough history and physical exam 

was performed.  Patient is saturating 91% on 2 L.  Laboratory studies are 

conduct and patient is swabbed for Kovic.  Laboratory studies reviewed.  Sodium 

132.  Covid is detected.  Chest x-ray demonstrates pulmonary mild interstitial 

filtrate.  I did go back into the room to reevaluate the patient and she now has

oxygen saturations of 88% on 2 L.  She needs to be bumped up to 4 L.  As she is 

now requiring oxygen I did recommend admission for which the patient did agree 

to.  She'll be admitted to [facility] physicians.  She remained in stable condition 

awaiting a bed on the floor





- Lab Data


Result diagrams: 


                                 11/27/21 15:38





                                 11/27/21 15:38


                                   Lab Results











  11/27/21 11/27/21 11/27/21 Range/Units





  15:38 15:38 15:38 


 


WBC  6.2    (3.8-10.6)  k/uL


 


RBC  6.03 H    (3.80-5.40)  m/uL


 


Hgb  18.6 H    (11.4-16.0)  gm/dL


 


Hct  53.8 H    (34.0-46.0)  %


 


MCV  89.2    (80.0-100.0)  fL


 


MCH  30.9    (25.0-35.0)  pg


 


MCHC  34.6    (31.0-37.0)  g/dL


 


RDW  16.0 H    (11.5-15.5)  %


 


Plt Count  112 L    (150-450)  k/uL


 


MPV  9.1    


 


Neutrophils %  73    %


 


Lymphocytes %  18    %


 


Monocytes %  7    %


 


Eosinophils %  0    %


 


Basophils %  1    %


 


Neutrophils #  4.6    (1.3-7.7)  k/uL


 


Lymphocytes #  1.1    (1.0-4.8)  k/uL


 


Monocytes #  0.4    (0-1.0)  k/uL


 


Eosinophils #  0.0    (0-0.7)  k/uL


 


Basophils #  0.0    (0-0.2)  k/uL


 


PT   11.4   (9.0-12.0)  sec


 


INR   1.1   (<1.2)  


 


APTT   28.9   (22.0-30.0)  sec


 


Sodium    132 L  (137-145)  mmol/L


 


Potassium    4.9  (3.5-5.1)  mmol/L


 


Chloride    103  ()  mmol/L


 


Carbon Dioxide    21 L  (22-30)  mmol/L


 


Anion Gap    8  mmol/L


 


BUN    14  (7-17)  mg/dL


 


Creatinine    0.79  (0.52-1.04)  mg/dL


 


Est GFR (CKD-EPI)AfAm    >90  (>60 ml/min/1.73 sqM)  


 


Est GFR (CKD-EPI)NonAf    80  (>60 ml/min/1.73 sqM)  


 


Glucose    111 H  (74-99)  mg/dL


 


Plasma Lactic Acid Jairo     (0.7-2.0)  mmol/L


 


Calcium    8.3 L  (8.4-10.2)  mg/dL


 


Total Bilirubin    0.9  (0.2-1.3)  mg/dL


 


AST    75 H  (14-36)  U/L


 


ALT    30  (4-34)  U/L


 


Alkaline Phosphatase    75  ()  U/L


 


Troponin I     (0.000-0.034)  ng/mL


 


NT-Pro-B Natriuret Pep     pg/mL


 


Total Protein    7.2  (6.3-8.2)  g/dL


 


Albumin    3.6  (3.5-5.0)  g/dL


 


Coronavirus (PCR)     (Not Detectd)  














  11/27/21 11/27/21 11/27/21 Range/Units





  15:38 15:38 15:38 


 


WBC     (3.8-10.6)  k/uL


 


RBC     (3.80-5.40)  m/uL


 


Hgb     (11.4-16.0)  gm/dL


 


Hct     (34.0-46.0)  %


 


MCV     (80.0-100.0)  fL


 


MCH     (25.0-35.0)  pg


 


MCHC     (31.0-37.0)  g/dL


 


RDW     (11.5-15.5)  %


 


Plt Count     (150-450)  k/uL


 


MPV     


 


Neutrophils %     %


 


Lymphocytes %     %


 


Monocytes %     %


 


Eosinophils %     %


 


Basophils %     %


 


Neutrophils #     (1.3-7.7)  k/uL


 


Lymphocytes #     (1.0-4.8)  k/uL


 


Monocytes #     (0-1.0)  k/uL


 


Eosinophils #     (0-0.7)  k/uL


 


Basophils #     (0-0.2)  k/uL


 


PT     (9.0-12.0)  sec


 


INR     (<1.2)  


 


APTT     (22.0-30.0)  sec


 


Sodium     (137-145)  mmol/L


 


Potassium     (3.5-5.1)  mmol/L


 


Chloride     ()  mmol/L


 


Carbon Dioxide     (22-30)  mmol/L


 


Anion Gap     mmol/L


 


BUN     (7-17)  mg/dL


 


Creatinine     (0.52-1.04)  mg/dL


 


Est GFR (CKD-EPI)AfAm     (>60 ml/min/1.73 sqM)  


 


Est GFR (CKD-EPI)NonAf     (>60 ml/min/1.73 sqM)  


 


Glucose     (74-99)  mg/dL


 


Plasma Lactic Acid Jairo  1.0    (0.7-2.0)  mmol/L


 


Calcium     (8.4-10.2)  mg/dL


 


Total Bilirubin     (0.2-1.3)  mg/dL


 


AST     (14-36)  U/L


 


ALT     (4-34)  U/L


 


Alkaline Phosphatase     ()  U/L


 


Troponin I   0.022   (0.000-0.034)  ng/mL


 


NT-Pro-B Natriuret Pep    1120  pg/mL


 


Total Protein     (6.3-8.2)  g/dL


 


Albumin     (3.5-5.0)  g/dL


 


Coronavirus (PCR)     (Not Detectd)  














  11/27/21 Range/Units





  15:38 


 


WBC   (3.8-10.6)  k/uL


 


RBC   (3.80-5.40)  m/uL


 


Hgb   (11.4-16.0)  gm/dL


 


Hct   (34.0-46.0)  %


 


MCV   (80.0-100.0)  fL


 


MCH   (25.0-35.0)  pg


 


MCHC   (31.0-37.0)  g/dL


 


RDW   (11.5-15.5)  %


 


Plt Count   (150-450)  k/uL


 


MPV   


 


Neutrophils %   %


 


Lymphocytes %   %


 


Monocytes %   %


 


Eosinophils %   %


 


Basophils %   %


 


Neutrophils #   (1.3-7.7)  k/uL


 


Lymphocytes #   (1.0-4.8)  k/uL


 


Monocytes #   (0-1.0)  k/uL


 


Eosinophils #   (0-0.7)  k/uL


 


Basophils #   (0-0.2)  k/uL


 


PT   (9.0-12.0)  sec


 


INR   (<1.2)  


 


APTT   (22.0-30.0)  sec


 


Sodium   (137-145)  mmol/L


 


Potassium   (3.5-5.1)  mmol/L


 


Chloride   ()  mmol/L


 


Carbon Dioxide   (22-30)  mmol/L


 


Anion Gap   mmol/L


 


BUN   (7-17)  mg/dL


 


Creatinine   (0.52-1.04)  mg/dL


 


Est GFR (CKD-EPI)AfAm   (>60 ml/min/1.73 sqM)  


 


Est GFR (CKD-EPI)NonAf   (>60 ml/min/1.73 sqM)  


 


Glucose   (74-99)  mg/dL


 


Plasma Lactic Acid Jairo   (0.7-2.0)  mmol/L


 


Calcium   (8.4-10.2)  mg/dL


 


Total Bilirubin   (0.2-1.3)  mg/dL


 


AST   (14-36)  U/L


 


ALT   (4-34)  U/L


 


Alkaline Phosphatase   ()  U/L


 


Troponin I   (0.000-0.034)  ng/mL


 


NT-Pro-B Natriuret Pep   pg/mL


 


Total Protein   (6.3-8.2)  g/dL


 


Albumin   (3.5-5.0)  g/dL


 


Coronavirus (PCR)  Detected A  (Not Detectd)  














- EKG Data


EKG Comments: 





EKG demonstrates A. fib with a rate of 85.  QRS 74.  QTC of 447.  No acute ST 

segment elevations or depressions concerning for ischemic changes





Disposition


Clinical Impression: 


 COVID-19, Hypoxia, History of COPD





Disposition: ADMITTED AS IP TO THIS HOSP


Condition: Stable


Is patient prescribed a controlled substance at d/c from ED?: No


Referrals: 


Nonstaff,Physician [Primary Care Provider] - 1-2 days


Decision to Admit Reason: Admit from EC


Decision Date: 11/27/21


Decision Time: 17:31

## 2021-11-27 NOTE — XR
EXAMINATION TYPE: XR chest 2V

 

DATE OF EXAM: 11/27/2021

 

COMPARISON: NONE

 

HISTORY: Short of breath

 

TECHNIQUE: 2 views

 

FINDINGS: There is coarsening of the interstitial markings in both lungs. Heart is enlarged. There is
 no pleural effusion. Bony thorax is intact.

 

IMPRESSION: There is some pulmonary mild interstitial infiltrate. Mild cardiomegaly.

Mild heart failure not entirely excluded.

## 2021-11-28 LAB
ANION GAP SERPL CALC-SCNC: 12.5 MMOL/L (ref 10–18)
BASOPHILS # BLD AUTO: 0.01 X 10*3/UL (ref 0–0.1)
BASOPHILS NFR BLD AUTO: 0.3 %
BUN SERPL-SCNC: 25.1 MG/DL (ref 9–27)
BUN/CREAT SERPL: 25.1 RATIO (ref 12–20)
CALCIUM SPEC-MCNC: 8.2 MG/DL (ref 8.7–10.3)
CHLORIDE SERPL-SCNC: 98 MMOL/L (ref 96–109)
CO2 SERPL-SCNC: 19.5 MMOL/L (ref 20–27.5)
EOSINOPHIL # BLD AUTO: 0 X 10*3/UL (ref 0.04–0.35)
EOSINOPHIL NFR BLD AUTO: 0 %
ERYTHROCYTE [DISTWIDTH] IN BLOOD BY AUTOMATED COUNT: 5.74 X 10*6/UL (ref 4.1–5.2)
ERYTHROCYTE [DISTWIDTH] IN BLOOD: 16 % (ref 11.5–14.5)
FERRITIN SERPL-MCNC: 225 NG/ML (ref 10–291)
GLUCOSE SERPL-MCNC: 157 MG/DL (ref 70–110)
HCT VFR BLD AUTO: 51.9 % (ref 37.2–46.3)
HGB BLD-MCNC: 16.5 G/DL (ref 12–15)
LYMPHOCYTES # SPEC AUTO: 0.88 X 10*3/UL (ref 0.9–5)
LYMPHOCYTES NFR SPEC AUTO: 23.3 %
MCH RBC QN AUTO: 28.7 PG (ref 27–32)
MCHC RBC AUTO-ENTMCNC: 31.8 G/DL (ref 32–37)
MCV RBC AUTO: 90.4 FL (ref 80–97)
MONOCYTES # BLD AUTO: 0.28 X 10*3/UL (ref 0.2–1)
MONOCYTES NFR BLD AUTO: 7.4 %
NEUTROPHILS # BLD AUTO: 2.57 X 10*3/UL (ref 1.8–7.7)
NEUTROPHILS NFR BLD AUTO: 67.9 %
PLATELET # BLD AUTO: 102 X 10*3/UL (ref 140–440)
POTASSIUM SERPL-SCNC: 5.2 MMOL/L (ref 3.5–5.5)
SODIUM SERPL-SCNC: 130 MMOL/L (ref 135–145)
WBC # BLD AUTO: 3.78 X 10*3/UL (ref 4.5–10)

## 2021-11-28 RX ADMIN — APIXABAN SCH: 5 TABLET, FILM COATED ORAL at 01:23

## 2021-11-28 RX ADMIN — DEXTROSE SCH MG: 50 INJECTION, SOLUTION INTRAVENOUS at 08:56

## 2021-11-28 RX ADMIN — MIRTAZAPINE SCH: 45 TABLET, FILM COATED ORAL at 01:24

## 2021-11-28 RX ADMIN — PANTOPRAZOLE SODIUM SCH MG: 40 TABLET, DELAYED RELEASE ORAL at 08:58

## 2021-11-28 RX ADMIN — MIRTAZAPINE SCH MG: 45 TABLET, FILM COATED ORAL at 19:31

## 2021-11-28 RX ADMIN — ACETAMINOPHEN PRN MG: 325 TABLET, FILM COATED ORAL at 01:28

## 2021-11-28 RX ADMIN — DILTIAZEM HYDROCHLORIDE SCH: 120 CAPSULE, COATED, EXTENDED RELEASE ORAL at 08:58

## 2021-11-28 RX ADMIN — AMIODARONE HYDROCHLORIDE SCH MG: 200 TABLET ORAL at 08:57

## 2021-11-28 RX ADMIN — APIXABAN SCH MG: 5 TABLET, FILM COATED ORAL at 08:58

## 2021-11-28 RX ADMIN — METOPROLOL TARTRATE SCH MG: 50 TABLET, FILM COATED ORAL at 19:30

## 2021-11-28 RX ADMIN — APIXABAN SCH MG: 5 TABLET, FILM COATED ORAL at 19:31

## 2021-11-28 RX ADMIN — CEFAZOLIN SCH: 330 INJECTION, POWDER, FOR SOLUTION INTRAMUSCULAR; INTRAVENOUS at 23:51

## 2021-11-28 RX ADMIN — ALBUTEROL SULFATE PRN PUFF: 90 AEROSOL, METERED RESPIRATORY (INHALATION) at 08:54

## 2021-11-28 RX ADMIN — METOPROLOL TARTRATE SCH: 50 TABLET, FILM COATED ORAL at 09:04

## 2021-11-28 RX ADMIN — CEFAZOLIN SCH: 330 INJECTION, POWDER, FOR SOLUTION INTRAMUSCULAR; INTRAVENOUS at 09:05

## 2021-11-28 RX ADMIN — METOPROLOL TARTRATE SCH: 50 TABLET, FILM COATED ORAL at 01:24

## 2021-11-28 RX ADMIN — ACETAMINOPHEN PRN MG: 325 TABLET, FILM COATED ORAL at 19:30

## 2021-11-28 RX ADMIN — ALBUTEROL SULFATE PRN PUFF: 90 AEROSOL, METERED RESPIRATORY (INHALATION) at 12:25

## 2021-11-28 NOTE — P.HPIM
History of Present Illness


H&P Date: 11/27/21


Chief Complaint: Shortness of breath





66-year-old female with COPD not on home oxygen, atrial fibrillation on 

anticoagulation





Patient comes in with 4 day history of increased shortness of breath and feeling

sick fatigue and tired she's been sleeping all day for the past 4 days.  She has

a chronic cough that has not changed she is not vaccinated against Covid she 

denies any sore throat runny nose she denies any nausea vomiting diarrhea denies

any fevers or chills


However she's been having increased and worsening back pain with stiffness.


She has recently traveled from [facility] to celebrate 

Thanksgiving with her son and she denies any sick contacts is being any hospital

recently.


She denies any GI bleeding denies any abdominal pain denies any chest pain.


Today home her pulse ox showed her oxygen level is 88-89% on room air for which 

she decided come the hospital for evaluation





In the ED patient tested positive for Covid chest x-ray showed pulmonary mild 

interstitial infiltrate


Lactic acid was normal hemoglobin elevated at 8.6 secondary polycythemia due to 

heavy smoking patient claims that she quit smoking couple weeks ago





Review of Systems





 











Pertinent positives as noted in HPI. All other systems were reviewed and are 

negative


 





Past Medical History


Past Medical History: Atrial Fibrillation, COPD


History of Any Multi-Drug Resistant Organisms: None Reported


Past Surgical History: Ablation


Past Psychological History: No Psychological Hx Reported


Smoking Status: Former smoker


Past Alcohol Use History: None Reported


Past Drug Use History: None Reported





- Past Family History


  ** Father


Family Medical History: Myocardial Infarction (MI), Rheumatoid Arthritis (RA)





Medications and Allergies


                                Home Medications











 Medication  Instructions  Recorded  Confirmed  Type


 


Albuterol Sulfate [Ventolin HFA] 2 puff INHALATION RT-Q6H PRN 12/22/20 11/27/21 

History


 


Ondansetron Odt [Zofran Odt] 4 mg PO Q8HR PRN #10 tab 12/22/20 11/27/21 Rx


 


Pantoprazole Sodium [Protonix] 40 mg PO DAILY 12/22/20 11/27/21 History


 


busPIRone HCl [Buspar] 10 mg PO TID 12/22/20 11/27/21 History


 


Amiodarone [Cordarone] 200 mg PO DAILY 11/27/21 11/27/21 History


 


Apixaban [Eliquis] 5 mg PO BID 11/27/21 11/27/21 History


 


Diltiazem HCl [Diltiazem HCl 24Hr 120 mg PO DAILY 11/27/21 11/27/21 History





ER (CD)]    


 


Ipratropium Bromide [Atrovent Hfa] 2 puff INHALATION RT-QID 11/27/21 11/27/21 

History


 


Metoprolol Tartrate [Lopressor] 100 mg PO BID 11/27/21 11/27/21 History


 


Mirtazapine [Remeron] 45 mg PO HS 11/27/21 11/27/21 History








                                    Allergies











Allergy/AdvReac Type Severity Reaction Status Date / Time


 


amoxicillin [From Augmentin] Allergy  Unknown Verified 11/27/21 18:10


 


clavulanic acid Allergy  Unknown Verified 11/27/21 18:10





[From Augmentin]     


 


codeine Allergy  Unknown Verified 11/27/21 18:10


 


Tetracyclines Allergy  Unknown Verified 11/27/21 18:10














Physical Exam


Vitals: 


                                   Vital Signs











  Temp Pulse Resp BP Pulse Ox


 


 11/27/21 18:41   77  24  129/90  95


 


 11/27/21 16:50      96


 


 11/27/21 16:30   80  20  114/77  91 L


 


 11/27/21 16:00   81  20  106/80  91 L


 


 11/27/21 15:41      95


 


 11/27/21 15:15      97


 


 11/27/21 14:51  98.6 F  102 H  24  113/71  92 L








                                Intake and Output











 11/27/21 11/27/21 11/27/21





 06:59 14:59 22:59


 


Other:   


 


  Weight  102.058 kg 














Constitutional:          No acute distress, conversant, pleasant


Eyes:      Anicteric sclerae, moist conjunctiva, 


         Pupils equal round reactive to light





ENMT:      NC/AT


         Oropharynx clear, no erythema, or exudates





Neck:      Supple, FROM, no masses, or JVD


         No carotid bruits


         No thyromegaly





Lungs:      Clear to auscultation


         Clear to percussion


         Normal respiratory effort, no accessory muscle use 





Cardiovascular:      Heart irregular


         No murmurs, gallops, or rubs


         No peripheral edema





Abdominal:       Soft


         Nontender, no guarding, rebound or rigidity


         Abdomen moving with respiration


         Normoactive bowel sounds


         No hepatomegaly, No splenomegaly


         No palpable mass 


         No abdominal wall hernia noted 





Skin:      Normal temperature, tone, texture, turgor


         No induration


         No subcutaneous nodules 


         No rash, lesions


         No ulcers





Extremities:      No digital cyanosis 


         No clubbing


         Pedal pulses intact and symmetrical


         Radial pulses intact and symmetrical 


         No calf tenderness 





Psychiatric:      Alert and oriented to person, place and time


         Appropriate affect


         fair judgement   


      


Neuro      Muscles Strength 5/5 in all 4 extremities 


         Sensation to light touch grossly present throughout


         Cranial nerves II-XII grossly intact


         No focal sensory deficits


Lymphatics:       no palpable cervical or supraclavicular , or inguinal lymph 

nodes  





Results


CBC & Chem 7: 


                                 11/27/21 15:38





                                 11/27/21 15:38


Labs: 


                  Abnormal Lab Results - Last 24 Hours (Table)











  11/27/21 11/27/21 11/27/21 Range/Units





  15:38 15:38 15:38 


 


RBC  6.03 H    (3.80-5.40)  m/uL


 


Hgb  18.6 H    (11.4-16.0)  gm/dL


 


Hct  53.8 H    (34.0-46.0)  %


 


RDW  16.0 H    (11.5-15.5)  %


 


Plt Count  112 L    (150-450)  k/uL


 


Sodium   132 L   (137-145)  mmol/L


 


Carbon Dioxide   21 L   (22-30)  mmol/L


 


Glucose   111 H   (74-99)  mg/dL


 


Calcium   8.3 L   (8.4-10.2)  mg/dL


 


AST   75 H   (14-36)  U/L


 


Coronavirus (PCR)    Detected A  (Not Detectd)  














Assessment and Plan


Assessment: 





Acute hypoxic respiratory failure


Covid pneumonitis


Supplemental oxygen as needed


Dexamethasone


Supportive care


Tylenol for fever


Motrin for pain


Monitor Vital signs


Cardiac monitoring





COPD


Continue DuoNeb's and inhalers


Counseled and oxygen as needed





Check fibrinogen and d-dimer, troponin proBNP, CRP, LDH, procalcitonin for 

prognostic evaluation





A. fib on anticoagulation


Resume Eliquis








Patient is full code


DVT prophylaxis currently on Eliquis for A. fib


Anticipated length of stay more than 2 midnights

## 2021-11-28 NOTE — P.PN
Subjective


Progress Note Date: 11/28/21





Pt reports improvement in breathing today.  





Objective





- Vital Signs


Vital signs: 


                                   Vital Signs











Temp  98 F   11/28/21 10:12


 


Pulse  78   11/28/21 10:12


 


Resp  18   11/28/21 10:12


 


BP  120/79   11/28/21 10:12


 


Pulse Ox  91 L  11/28/21 10:12








                                 Intake & Output











 11/27/21 11/28/21 11/28/21





 18:59 06:59 18:59


 


Weight 102.058 kg 102.058 kg 














- Exam





Gen: awake, alert


HEENT: normocephalic, atraumatic, good hearing acuity, moist mucous membranes


Resp: good air exchange, breathing comfortably with no accessory muscle use


CVS: good distal perfusion x 4,


GI: soft, NTTP, ND


: no SPT, no CVAT, olmos catheter not present


MSK: no pitting edema, no clubbing


Neuro: non-focal, moving all extremities


Psych: cooperative, euthymic mood





- Labs


CBC & Chem 7: 


                                 11/28/21 07:29





                                 11/28/21 07:29


Labs: 


                  Abnormal Lab Results - Last 24 Hours (Table)











  11/27/21 11/27/21 11/27/21 Range/Units





  15:38 15:38 15:38 


 


WBC     (4.50-10.00)  X 10*3/uL


 


RBC  6.03 H    (3.80-5.40)  m/uL


 


Hgb  18.6 H    (11.4-16.0)  gm/dL


 


Hct  53.8 H    (34.0-46.0)  %


 


MCHC     (32.0-37.0)  g/dL


 


RDW  16.0 H    (11.5-15.5)  %


 


Plt Count  112 L    (150-450)  k/uL


 


Plt Count Comment     


 


Lymphocytes #     (0.90-5.00)  X 10*3/uL


 


Eosinophils #     (0.04-0.35)  X 10*3/uL


 


Sodium   132 L   (137-145)  mmol/L


 


Carbon Dioxide   21 L   (22-30)  mmol/L


 


Est GFR (CKD-EPI)NonAf     (60.0-200.0)   


 


BUN/Creatinine Ratio     (12.00-20.00)  Ratio


 


Glucose   111 H   (74-99)  mg/dL


 


Calcium   8.3 L   (8.4-10.2)  mg/dL


 


AST   75 H   (14-36)  U/L


 


Lactate Dehydrogenase     (313-618)  U/L


 


C-Reactive Protein     (<1.0)  mg/dL


 


Coronavirus (PCR)    Detected A  (Not Detectd)  














  11/27/21 11/28/21 11/28/21 Range/Units





  22:42 07:29 07:29 


 


WBC   3.78 L   (4.50-10.00)  X 10*3/uL


 


RBC   5.74 H   (3.80-5.40)  m/uL


 


Hgb   16.5 H   (11.4-16.0)  gm/dL


 


Hct   51.9 H   (34.0-46.0)  %


 


MCHC   31.8 L   (32.0-37.0)  g/dL


 


RDW   16.0 H   (11.5-15.5)  %


 


Plt Count   102 L   (150-450)  k/uL


 


Plt Count Comment   DECREASED A   


 


Lymphocytes #   0.88 L   (0.90-5.00)  X 10*3/uL


 


Eosinophils #   0 L   (0.04-0.35)  X 10*3/uL


 


Sodium    130 L  (137-145)  mmol/L


 


Carbon Dioxide    19.5 L  (22-30)  mmol/L


 


Est GFR (CKD-EPI)NonAf    59.1 L  (60.0-200.0)   


 


BUN/Creatinine Ratio    25.10 H  (12.00-20.00)  Ratio


 


Glucose    157 H  (74-99)  mg/dL


 


Calcium    8.2 L  (8.4-10.2)  mg/dL


 


AST     (14-36)  U/L


 


Lactate Dehydrogenase  793 H    (313-618)  U/L


 


C-Reactive Protein  1.6 H    (<1.0)  mg/dL


 


Coronavirus (PCR)     (Not Detectd)  














Assessment and Plan


Assessment: 





Acute hypoxic respiratory failure


Covid pneumonitis


Supplemental oxygen as needed


Dexamethasone


Supportive care


Tylenol for fever


Motrin for pain


Monitor Vital signs


Cardiac monitoring


Pulmonary consult





COPD


Continue DuoNeb's and inhalers


Counseled and oxygen as needed





Check fibrinogen and d-dimer, troponin proBNP, CRP, LDH, procalcitonin for 

prognostic evaluation





Paroxysmal A. fib on anticoagulation


Resume Eliquis








Patient is full code


DVT prophylaxis currently on Eliquis for A. fib


Anticipated length of stay more than 2 midnights

## 2021-11-29 LAB
ALBUMIN SERPL-MCNC: 3.5 G/DL (ref 3.8–4.9)
ALBUMIN/GLOB SERPL: 1.3 G/DL (ref 1.6–3.17)
ALP SERPL-CCNC: 72 U/L (ref 41–126)
ALT SERPL-CCNC: 34 U/L (ref 8–44)
ANION GAP SERPL CALC-SCNC: 11.8 MMOL/L (ref 10–18)
AST SERPL-CCNC: 48 U/L (ref 13–35)
BASOPHILS # BLD AUTO: 0.01 X 10*3/UL (ref 0–0.1)
BASOPHILS NFR BLD AUTO: 0.1 %
BUN SERPL-SCNC: 19.1 MG/DL (ref 9–27)
BUN/CREAT SERPL: 27.29 RATIO (ref 12–20)
CALCIUM SPEC-MCNC: 8.3 MG/DL (ref 8.7–10.3)
CHLORIDE SERPL-SCNC: 105 MMOL/L (ref 96–109)
CO2 SERPL-SCNC: 20.2 MMOL/L (ref 20–27.5)
EOSINOPHIL # BLD AUTO: 0 X 10*3/UL (ref 0.04–0.35)
EOSINOPHIL NFR BLD AUTO: 0 %
ERYTHROCYTE [DISTWIDTH] IN BLOOD BY AUTOMATED COUNT: 5.45 X 10*6/UL (ref 4.1–5.2)
ERYTHROCYTE [DISTWIDTH] IN BLOOD: 16.3 % (ref 11.5–14.5)
GLOBULIN SER CALC-MCNC: 2.7 G/DL (ref 1.6–3.3)
GLUCOSE SERPL-MCNC: 136 MG/DL (ref 70–110)
HCT VFR BLD AUTO: 50.6 % (ref 37.2–46.3)
HGB BLD-MCNC: 15.8 G/DL (ref 12–15)
LDH SPEC-CCNC: 316 U/L (ref 120–246)
LYMPHOCYTES # SPEC AUTO: 0.88 X 10*3/UL (ref 0.9–5)
LYMPHOCYTES NFR SPEC AUTO: 8.7 %
MAGNESIUM SPEC-SCNC: 2 MG/DL (ref 1.5–2.4)
MCH RBC QN AUTO: 29 PG (ref 27–32)
MCHC RBC AUTO-ENTMCNC: 31.2 G/DL (ref 32–37)
MCV RBC AUTO: 92.8 FL (ref 80–97)
MONOCYTES # BLD AUTO: 0.49 X 10*3/UL (ref 0.2–1)
MONOCYTES NFR BLD AUTO: 4.9 %
NEUTROPHILS # BLD AUTO: 8.63 X 10*3/UL (ref 1.8–7.7)
NEUTROPHILS NFR BLD AUTO: 85.6 %
PH UR: 5.5 [PH] (ref 5–8)
PLATELET # BLD AUTO: 94 X 10*3/UL (ref 140–440)
POTASSIUM SERPL-SCNC: 5.1 MMOL/L (ref 3.5–5.5)
PROT SERPL-MCNC: 6.2 G/DL (ref 6.2–8.2)
SODIUM SERPL-SCNC: 137 MMOL/L (ref 135–145)
SP GR UR: 1.01 (ref 1–1.03)
UROBILINOGEN UR QL STRIP: <2 MG/DL (ref ?–2)
WBC # BLD AUTO: 10.08 X 10*3/UL (ref 4.5–10)

## 2021-11-29 RX ADMIN — METOPROLOL TARTRATE SCH MG: 50 TABLET, FILM COATED ORAL at 08:52

## 2021-11-29 RX ADMIN — AMIODARONE HYDROCHLORIDE SCH MG: 200 TABLET ORAL at 08:51

## 2021-11-29 RX ADMIN — ALBUTEROL SULFATE PRN PUFF: 90 AEROSOL, METERED RESPIRATORY (INHALATION) at 09:54

## 2021-11-29 RX ADMIN — APIXABAN SCH MG: 5 TABLET, FILM COATED ORAL at 20:33

## 2021-11-29 RX ADMIN — MIRTAZAPINE SCH MG: 45 TABLET, FILM COATED ORAL at 20:33

## 2021-11-29 RX ADMIN — CEFAZOLIN SCH: 330 INJECTION, POWDER, FOR SOLUTION INTRAMUSCULAR; INTRAVENOUS at 08:54

## 2021-11-29 RX ADMIN — DEXTROSE SCH MG: 50 INJECTION, SOLUTION INTRAVENOUS at 08:53

## 2021-11-29 RX ADMIN — APIXABAN SCH MG: 5 TABLET, FILM COATED ORAL at 08:51

## 2021-11-29 RX ADMIN — METOPROLOL TARTRATE SCH MG: 50 TABLET, FILM COATED ORAL at 20:32

## 2021-11-29 RX ADMIN — PANTOPRAZOLE SODIUM SCH MG: 40 TABLET, DELAYED RELEASE ORAL at 08:52

## 2021-11-29 RX ADMIN — DILTIAZEM HYDROCHLORIDE SCH MG: 120 CAPSULE, COATED, EXTENDED RELEASE ORAL at 08:51

## 2021-11-29 NOTE — P.CNPUL
History of Present Illness


Consult date: 11/29/21


Reason for consult: pneumonia


History of present illness: 





66-year-old female patient with known history of COPD presented to the hospital 

because of worsening shortness of breath and feeling sick and fatigued and the 

patient was diagnosed having COVID 19 related pneumonia.  Noted the patient was 

not vaccinated for COVID 19.  She did not have any other symptoms.  Denies 

having any nausea or vomiting or diarrhea.  Denies having any fever or chills.  

The patient was seen in the emergency department.  The chest x-ray showed 

bilateral pulmonary infiltrates.  The patient was afebrile.  The patient was 

hemodynamically stable.  Pulse ox was low on the 90% and the patient was placed 

initially on 4 L and currently she is on 2liters  by nasal cannula.  The 

patient's d-dimer is at 0.36, the patient had a LDH level of 316 which is lower 

compared to 793 at a time of admission and the CRP level is less than 0.3.  The 

patient currently is on Decadron 6 mg IV every 24 hours.  The patient is also on

long-term medical condition with Eliquis nontender the patient has history of 

atrial fibrillation.  The patient remains on amiodarone and metoprolol regarding

her atrial fibrillation.





Review of Systems


Constitutional: Reports as per HPI, Reports fatigue, Reports weakness


Eyes: denies as per HPI, denies blurred vision, denies bulging eye, denies 

decreased vision, denies diplopia, denies discharge, denies dry eye, denies 

irritation, denies itching, denies pain, denies photophobia, denies loss of 

peripheral vision, denies loss of vision, denies tunnel vision/blind spots


Ears: deny: decreased hearing, ear discharge, earache, tinnitus


Ears, nose, mouth and throat: Reports as per HPI


Breasts: absent: as per HPI, change in shape, gynecomastia, masses, nipple 

discharge, pain, skin changes, swelling


Cardiovascular: Reports decreased exercise tolerance, Reports dyspnea on 

exertion


Respiratory: Reports cough, Reports dyspnea


Gastrointestinal: Reports as per HPI


Genitourinary: Reports as per HPI


Menstruation: Reports as per HPI


Musculoskeletal: Reports as per HPI


Musculoskeletal: absent: ankle pain, ankle stiffness, ankle swelling, as per 

HPI, elbow pain, elbow stiffness, elbow swelling, foot pain, foot stiffness, 

foot swelling, hand pain, hand stiffness, hand swelling, hip pain, hip 

stiffness, hip swelling, knee pain, knee stiffness, knee swelling, shoulder 

pain, shoulder stiffness, shoulder swelling, wrist pain, wrist stiffness, wrist 

swelling


Neurological: Reports as per HPI


Psychiatric: Reports as per HPI


Endocrine: Reports as per HPI


Hematologic/Lymphatic: Reports as per HPI


Allergic/Immunologic: Reports as per HPI





Past Medical History


Past Medical History: Atrial Fibrillation, COPD


History of Any Multi-Drug Resistant Organisms: None Reported


Past Surgical History: Ablation


Additional Past Surgical History / Comment(s): Brain aneursym 3/3/2020


Past Psychological History: No Psychological Hx Reported


Smoking Status: Former smoker


Past Alcohol Use History: None Reported


Past Drug Use History: None Reported





- Past Family History


  ** Father


Family Medical History: Myocardial Infarction (MI), Rheumatoid Arthritis (RA)





Medications and Allergies


                                Home Medications











 Medication  Instructions  Recorded  Confirmed  Type


 


Albuterol Sulfate [Ventolin HFA] 2 puff INHALATION RT-Q6H PRN 12/22/20 11/27/21 

History


 


Ondansetron Odt [Zofran Odt] 4 mg PO Q8HR PRN #10 tab 12/22/20 11/27/21 Rx


 


Pantoprazole Sodium [Protonix] 40 mg PO DAILY 12/22/20 11/27/21 History


 


busPIRone HCl [Buspar] 10 mg PO TID 12/22/20 11/27/21 History


 


Amiodarone [Cordarone] 200 mg PO DAILY 11/27/21 11/27/21 History


 


Apixaban [Eliquis] 5 mg PO BID 11/27/21 11/27/21 History


 


Diltiazem HCl [Diltiazem HCl 24Hr 120 mg PO DAILY 11/27/21 11/27/21 History





ER (CD)]    


 


Ipratropium Bromide [Atrovent Hfa] 2 puff INHALATION RT-QID 11/27/21 11/27/21 

History


 


Metoprolol Tartrate [Lopressor] 100 mg PO BID 11/27/21 11/27/21 History


 


Mirtazapine [Remeron] 45 mg PO HS 11/27/21 11/27/21 History








                                    Allergies











Allergy/AdvReac Type Severity Reaction Status Date / Time


 


amoxicillin [From Augmentin] Allergy  Unknown Verified 11/27/21 18:10


 


clavulanic acid Allergy  Unknown Verified 11/27/21 18:10





[From Augmentin]     


 


codeine Allergy  Unknown Verified 11/27/21 18:10


 


Tetracyclines Allergy  Unknown Verified 11/27/21 18:10














Physical Exam


Vitals: 


                                   Vital Signs











  Temp Pulse Resp BP Pulse Ox


 


 11/29/21 08:00      93 L


 


 11/29/21 06:00  97.5 F L  110 H   130/90  92 L


 


 11/29/21 02:00  97.7 F  86  20  132/89  95


 


 11/28/21 22:05  98.0 F  52 L  19  98/64  96


 


 11/28/21 20:00   81  17  


 


 11/28/21 17:41  98.2 F  81  17  119/74  91 L


 


 11/28/21 14:37  97.9 F  76  18  119/75  90 L








                                Intake and Output











 11/28/21 11/29/21 11/29/21





 22:59 06:59 14:59


 


Intake Total 1440  


 


Balance 1440  


 


Intake:   


 


  Intake, IV Titration 900  





  Amount   


 


    Sodium Chloride 0.9% 1, 900  





    000 ml @ 75 mls/hr IV .   





    [id number] ANASTASIIA Rx#:[id number]   


 


  Oral 540  


 


Other:   


 


  # Voids 2 2 

















Gen. appearance the patient is calm and the patient is currently on 2 L   by 

nasal cannula, breathing is nonlabored


Head exam was generally normal. There was no scleral icterus or corneal arcus. 

Mucous membranes were moist.


Neck was supple and without jugular venous distension, thyromegaly, or carotid 

bruits. Carotids were easily palpable bilaterally. There was no adenopathy.


Lungs sounds are diminished in the lung bases bilaterally along with some 

limited bibasilar crackles


Cardiac exam revealed the PMI to be normally situated and sized. The rhythm was 

regular and no extrasystoles were noted during several minutes of auscultation. 

The first and second heart sounds were normal and physiologic splitting of the 

second heart sound was noted. There were no murmurs, rubs, clicks, or gallops.


Abdominal exam revealed normal bowel sounds. The abdomen was soft, non-tender, 

and without masses, organomegaly, or appreciable enlargement of the abdominal 

aorta.


Examination of the extremities revealed easily palpable radial, femoral and 

pedal pulses. There was no cyanosis, clubbing or edema.


Examination of the skin revealed no evidence of significant rashes, suspicious 

appearing nevi or other concerning lesions.


Neurologically, the patient is awake and alert and the patient does not have any

focal neurological deficit.  Cranial nerves are essentially intact.





Results





- Laboratory Findings


CBC and BMP: 


                                 11/29/21 07:00





                                 11/29/21 07:00


PT/INR, D-dimer











PT  11.4 sec (9.0-12.0)   11/27/21  15:38    


 


INR  1.1  (<1.2)   11/27/21  15:38    


 


D-Dimer  0.36 mg/L FEU (<0.60)   11/29/21  07:00    








Abnormal lab findings: 


                                  Abnormal Labs











  11/27/21 11/27/21 11/27/21





  15:38 15:38 15:38


 


WBC   


 


RBC  6.03 H  


 


Hgb  18.6 H  


 


Hct  53.8 H  


 


MCHC   


 


RDW  16.0 H  


 


Plt Count  112 L  


 


Plt Count Comment   


 


Immature Gran #   


 


Neutrophils #   


 


Lymphocytes #   


 


Eosinophils #   


 


Sodium   132 L 


 


Carbon Dioxide   21 L 


 


Est GFR (CKD-EPI)NonAf   


 


BUN/Creatinine Ratio   


 


Glucose   111 H 


 


Calcium   8.3 L 


 


Conjugated Bilirubin   


 


AST   75 H 


 


Lactate Dehydrogenase   


 


C-Reactive Protein   


 


Albumin   


 


Albumin/Globulin Ratio   


 


Coronavirus (PCR)    Detected A














  11/27/21 11/28/21 11/28/21





  22:42 07:29 07:29


 


WBC   3.78 L 


 


RBC   5.74 H 


 


Hgb   16.5 H 


 


Hct   51.9 H 


 


MCHC   31.8 L 


 


RDW   16.0 H 


 


Plt Count   102 L 


 


Plt Count Comment   DECREASED A 


 


Immature Gran #   


 


Neutrophils #   


 


Lymphocytes #   0.88 L 


 


Eosinophils #   0 L 


 


Sodium    130 L


 


Carbon Dioxide    19.5 L


 


Est GFR (CKD-EPI)NonAf    59.1 L


 


BUN/Creatinine Ratio    25.10 H


 


Glucose    157 H


 


Calcium    8.2 L


 


Conjugated Bilirubin   


 


AST   


 


Lactate Dehydrogenase  793 H  


 


C-Reactive Protein  1.6 H  


 


Albumin   


 


Albumin/Globulin Ratio   


 


Coronavirus (PCR)   














  11/29/21 11/29/21





  07:00 07:00


 


WBC  10.08 H 


 


RBC  5.45 H 


 


Hgb  15.8 H 


 


Hct  50.6 H 


 


MCHC  31.2 L 


 


RDW  16.3 H 


 


Plt Count  94 L 


 


Plt Count Comment  DECREASED A 


 


Immature Gran #  0.07 H 


 


Neutrophils #  8.63 H 


 


Lymphocytes #  0.88 L 


 


Eosinophils #  0 L 


 


Sodium  


 


Carbon Dioxide  


 


Est GFR (CKD-EPI)NonAf  


 


BUN/Creatinine Ratio   27.29 H


 


Glucose   136 H


 


Calcium   8.3 L


 


Conjugated Bilirubin   <0.20 L


 


AST   48 H


 


Lactate Dehydrogenase   316 H


 


C-Reactive Protein  


 


Albumin   3.5 L


 


Albumin/Globulin Ratio   1.30 L


 


Coronavirus (PCR)  














- Diagnostic Findings


Chest x-ray: image reviewed





Assessment and Plan


Plan: 





1 Acute COVID 19 related pneumonia.The patient has limited by the pulmonary 

infiltrates and the patient is slight hypoxia with mild elevation of the  

inflammatory markers which is essentially responding to steroids.





2 acute hypoxic respiratory failure currently on 2 L of oxygen by nasal cannula





3 COPD and the patient has been stable at 19 any form of oxygen on outpatient 

basis





4 history of chronic atrial fibrillation, controlled rate and the patient is on 

long-term articulation with Eliquis





Plan





The patient is clinically improving.  The patient is currently on Decadron this 

will be continued.  Continue long-term and to coagulation with Eliquis regarding

her chronic atrial fibrillation.  Inflammatory markers are all improving.  She 

is currently on 2 L about 2 by nasal cannula.  I'll patient medication skin be 

ordered resume.  Clinically the patient is feeling well.  It is likely that the 

patient can be potentially discharged home within next 24 hours either on room 

air oxygen or oxygen at 2 L.  Continue same treatment for now.  The patient 

lives in [facility] and the patient would like to get home as soon as 

possible.  She is quite unhappy with her stay at [place].

## 2021-11-29 NOTE — P.PN
Subjective


Progress Note Date: 11/29/21





Pt reports some pain with urination today.  But her breathing is improved.  Now 

on 2L NC from 4L.





Objective





- Vital Signs


Vital signs: 


                                   Vital Signs











Temp  97.5 F L  11/29/21 06:00


 


Pulse  110 H  11/29/21 06:00


 


Resp  20   11/29/21 02:00


 


BP  130/90   11/29/21 06:00


 


Pulse Ox  93 L  11/29/21 08:00








                                 Intake & Output











 11/28/21 11/29/21 11/29/21





 18:59 06:59 18:59


 


Intake Total 1440  


 


Balance 1440  


 


Intake:   


 


  Intake, IV Titration 900  





  Amount   


 


    Sodium Chloride 0.9% 1, 900  





    000 ml @ 75 mls/hr IV .   





    [id number] ANASTASIIA Rx#:[id number]   


 


  Oral 540  


 


Other:   


 


  # Voids 2 2 














- Exam





Gen: awake, alert


HEENT: normocephalic, atraumatic, good hearing acuity, moist mucous membranes


Resp: good air exchange, breathing comfortably with no accessory muscle use


CVS: good distal perfusion x 4,


GI: soft, NTTP, ND


: no SPT, no CVAT, olmos catheter not present


MSK: no pitting edema, no clubbing


Neuro: non-focal, moving all extremities


Psych: cooperative, euthymic mood 





- Labs


CBC & Chem 7: 


                                 11/28/21 07:29





                                 11/29/21 07:00


Labs: 


                  Abnormal Lab Results - Last 24 Hours (Table)











  11/28/21 11/28/21 11/29/21 Range/Units





  07:29 07:29 07:00 


 


WBC  3.78 L    (4.50-10.00)  X 10*3/uL


 


RBC  5.74 H    (4.10-5.20)  X 10*6/uL


 


Hgb  16.5 H    (12.0-15.0)  g/dL


 


Hct  51.9 H    (37.2-46.3)  %


 


MCHC  31.8 L    (32.0-37.0)  g/dL


 


RDW  16.0 H    (11.5-14.5)  %


 


Plt Count  102 L    (140-440)  X 10*3/uL


 


Plt Count Comment  DECREASED A    


 


Lymphocytes #  0.88 L    (0.90-5.00)  X 10*3/uL


 


Eosinophils #  0 L    (0.04-0.35)  X 10*3/uL


 


Sodium   130 L   (135-145)  mmol/L


 


Carbon Dioxide   19.5 L   (20.0-27.5)  mmol/L


 


Est GFR (CKD-EPI)NonAf   59.1 L   (60.0-200.0)   


 


BUN/Creatinine Ratio   25.10 H  27.29 H  (12.00-20.00)  Ratio


 


Glucose   157 H  136 H  ()  mg/dL


 


Calcium   8.2 L  8.3 L  (8.7-10.3)  mg/dL


 


Conjugated Bilirubin    <0.20 L  (0.20-0.40)  mg/dL


 


AST    48 H  (13-35)  U/L


 


Lactate Dehydrogenase    316 H  (120-246)  U/L


 


Albumin    3.5 L  (3.8-4.9)  g/dL


 


Albumin/Globulin Ratio    1.30 L  (1.60-3.17)  g/dL














Assessment and Plan


Assessment: 





Acute hypoxic respiratory failure


Covid pneumonitis


Supplemental oxygen as needed


Dexamethasone


Supportive care


Tylenol for fever


Motrin for pain


Monitor Vital signs


Cardiac monitoring


Pulmonary consult





COPD


Continue DuoNeb's and inhalers


Counseled and oxygen as needed





Check fibrinogen and d-dimer, troponin proBNP, CRP, LDH, procalcitonin for 

prognostic evaluation





Paroxysmal A. fib on anticoagulation


Resume Eliquis








Patient is full code


DVT prophylaxis currently on Eliquis for A. fib


Anticipated length of stay more than 2 midnights

## 2021-11-30 LAB
ALBUMIN SERPL-MCNC: 3.6 G/DL (ref 3.8–4.9)
ALBUMIN/GLOB SERPL: 1.29 G/DL (ref 1.6–3.17)
ALP SERPL-CCNC: 70 U/L (ref 41–126)
ALT SERPL-CCNC: 39 U/L (ref 8–44)
ANION GAP SERPL CALC-SCNC: 9.1 MMOL/L (ref 10–18)
AST SERPL-CCNC: 41 U/L (ref 13–35)
BASOPHILS # BLD AUTO: 0.03 X 10*3/UL (ref 0–0.1)
BASOPHILS NFR BLD AUTO: 0.3 %
BILIRUB INDIRECT SERPL-MCNC: 0.19 MG/DL (ref 0.2–1)
BUN SERPL-SCNC: 16.9 MG/DL (ref 9–27)
BUN/CREAT SERPL: 24.14 RATIO (ref 12–20)
CALCIUM SPEC-MCNC: 8.6 MG/DL (ref 8.7–10.3)
CHLORIDE SERPL-SCNC: 105 MMOL/L (ref 96–109)
CO2 SERPL-SCNC: 22.9 MMOL/L (ref 20–27.5)
EOSINOPHIL # BLD AUTO: 0 X 10*3/UL (ref 0.04–0.35)
EOSINOPHIL NFR BLD AUTO: 0 %
ERYTHROCYTE [DISTWIDTH] IN BLOOD BY AUTOMATED COUNT: 5.41 X 10*6/UL (ref 4.1–5.2)
ERYTHROCYTE [DISTWIDTH] IN BLOOD: 16.3 % (ref 11.5–14.5)
GLOBULIN SER CALC-MCNC: 2.8 G/DL (ref 1.6–3.3)
GLUCOSE SERPL-MCNC: 124 MG/DL (ref 70–110)
HCT VFR BLD AUTO: 50.1 % (ref 37.2–46.3)
HGB BLD-MCNC: 15.8 G/DL (ref 12–15)
LDH SPEC-CCNC: 285 U/L (ref 120–246)
LYMPHOCYTES # SPEC AUTO: 0.85 X 10*3/UL (ref 0.9–5)
LYMPHOCYTES NFR SPEC AUTO: 7.7 %
MAGNESIUM SPEC-SCNC: 2.1 MG/DL (ref 1.5–2.4)
MCH RBC QN AUTO: 29.2 PG (ref 27–32)
MCHC RBC AUTO-ENTMCNC: 31.5 G/DL (ref 32–37)
MCV RBC AUTO: 92.6 FL (ref 80–97)
MONOCYTES # BLD AUTO: 0.65 X 10*3/UL (ref 0.2–1)
MONOCYTES NFR BLD AUTO: 5.9 %
NEUTROPHILS # BLD AUTO: 9.37 X 10*3/UL (ref 1.8–7.7)
NEUTROPHILS NFR BLD AUTO: 85 %
PLATELET # BLD AUTO: 102 X 10*3/UL (ref 140–440)
POTASSIUM SERPL-SCNC: 4.8 MMOL/L (ref 3.5–5.5)
PROT SERPL-MCNC: 6.4 G/DL (ref 6.2–8.2)
SODIUM SERPL-SCNC: 137 MMOL/L (ref 135–145)
WBC # BLD AUTO: 11.02 X 10*3/UL (ref 4.5–10)

## 2021-11-30 RX ADMIN — APIXABAN SCH MG: 5 TABLET, FILM COATED ORAL at 20:13

## 2021-11-30 RX ADMIN — DEXTROSE SCH MG: 50 INJECTION, SOLUTION INTRAVENOUS at 07:51

## 2021-11-30 RX ADMIN — CEFAZOLIN SCH: 330 INJECTION, POWDER, FOR SOLUTION INTRAMUSCULAR; INTRAVENOUS at 01:19

## 2021-11-30 RX ADMIN — TIOTROPIUM BROMIDE INHALATION SPRAY SCH PUFF: 3.12 SPRAY, METERED RESPIRATORY (INHALATION) at 12:45

## 2021-11-30 RX ADMIN — ALBUTEROL SULFATE SCH PUFF: 90 AEROSOL, METERED RESPIRATORY (INHALATION) at 12:46

## 2021-11-30 RX ADMIN — PANTOPRAZOLE SODIUM SCH MG: 40 TABLET, DELAYED RELEASE ORAL at 07:52

## 2021-11-30 RX ADMIN — APIXABAN SCH MG: 5 TABLET, FILM COATED ORAL at 07:52

## 2021-11-30 RX ADMIN — ALBUTEROL SULFATE SCH: 90 AEROSOL, METERED RESPIRATORY (INHALATION) at 17:24

## 2021-11-30 RX ADMIN — FUROSEMIDE SCH MG: 10 INJECTION, SOLUTION INTRAMUSCULAR; INTRAVENOUS at 09:26

## 2021-11-30 RX ADMIN — ALBUTEROL SULFATE SCH PUFF: 90 AEROSOL, METERED RESPIRATORY (INHALATION) at 21:37

## 2021-11-30 RX ADMIN — ALBUTEROL SULFATE SCH: 90 AEROSOL, METERED RESPIRATORY (INHALATION) at 09:42

## 2021-11-30 RX ADMIN — AMIODARONE HYDROCHLORIDE SCH MG: 200 TABLET ORAL at 07:52

## 2021-11-30 RX ADMIN — AZITHROMYCIN SCH MG: 500 TABLET, FILM COATED ORAL at 10:13

## 2021-11-30 RX ADMIN — CEFAZOLIN SCH: 330 INJECTION, POWDER, FOR SOLUTION INTRAMUSCULAR; INTRAVENOUS at 07:54

## 2021-11-30 RX ADMIN — METOPROLOL TARTRATE SCH MG: 50 TABLET, FILM COATED ORAL at 07:51

## 2021-11-30 RX ADMIN — METOPROLOL TARTRATE SCH MG: 50 TABLET, FILM COATED ORAL at 20:13

## 2021-11-30 RX ADMIN — MIRTAZAPINE SCH MG: 45 TABLET, FILM COATED ORAL at 20:49

## 2021-11-30 RX ADMIN — DILTIAZEM HYDROCHLORIDE SCH MG: 120 CAPSULE, COATED, EXTENDED RELEASE ORAL at 07:53

## 2021-11-30 NOTE — P.PN
Subjective


Progress Note Date: 11/30/21








66-year-old female patient with known history of COPD presented to the hospital 

because of worsening shortness of breath and feeling sick and fatigued and the 

patient was diagnosed having COVID 19 related pneumonia.  Noted the patient was 

not vaccinated for COVID 19.  She did not have any other symptoms.  Denies 

having any nausea or vomiting or diarrhea.  Denies having any fever or chills.  

The patient was seen in the emergency department.  The chest x-ray showed 

bilateral pulmonary infiltrates.  The patient was afebrile.  The patient was 

hemodynamically stable.  Pulse ox was low on the 90% and the patient was placed 

initially on 4 L and currently she is on 2liters  by nasal cannula.  The patient

's d-dimer is at 0.36, the patient had a LDH level of 316 which is lower 

compared to 793 at a time of admission and the CRP level is less than 0.3.  The 

patient currently is on Decadron 6 mg IV every 24 hours.  The patient is also on

long-term medical condition with Eliquis nontender the patient has history of 

atrial fibrillation.  The patient remains on amiodarone and metoprolol regarding

her atrial fib.





On 11/30/2021, the patient is being seen for a follow-up.  I saw her in 

consultation yesterday for COVID 19 related pneumonia.  The patient was being 

treated with Decadron.  Noted the inflammatory markers were essentially normal 

including LDH and CRP.  She is also known to have chronic atrial fibrillation.  

She lives in the [facility] and she was visiting in [place] with her son 

when she ended up being sick and she was hospitalized.  Overnight, the patient 

had some worsening shortness of breath.  Her oxygen level dropped and the 

patient had to be placed on oxygen at 3.5 L.  Her current pulse ox in the order 

of 93-94%.  The chest x-ray done at that time showed CHF/pulmonary edema and for

that reason, the patient was given Lasix at a dose of 40 mg IV push which helped

her with diuresis.  She is resting comfortably at this point in time.  

Echocardiogram is to be completed.  Meanwhile, the patient underwent further 

blood work that showed a d-dimer of 0.6 and the LDH level was down to 285 and a 

CRP level is less than 0.3.  Hemoglobin is at 15.8 with a white cell count of 11

.2.  For now, the patient is on Lasix 40 mg IV every 24 hours she remains on 

Decadron.  She felt better.  She is a 92% pulse ox with oxygen flow of 3 L.





Objective





- Vital Signs


Vital signs: 


                                   Vital Signs











Temp  98.1 F   11/30/21 05:54


 


Pulse  72   11/30/21 05:54


 


Resp  19   11/30/21 07:52


 


BP  143/89   11/30/21 05:54


 


Pulse Ox  92 L  11/30/21 05:54








                                 Intake & Output











 11/29/21 11/30/21 11/30/21





 18:59 06:59 18:59


 


Output Total   800


 


Balance   -800


 


Output:   


 


  Urine   800


 


Other:   


 


  # Voids 1 1 














- Exam














Gen. appearance the patient is calm and the patient is currently on 3.5 L   by 

nasal cannula, breathing is nonlabored


Head exam was generally normal. There was no scleral icterus or corneal arcus. 

Mucous membranes were moist.


Neck was supple and without jugular venous distension, thyromegaly, or carotid 

bruits. Carotids were easily palpable bilaterally. There was no adenopathy.


Lungs sounds are diminished in the lung bases bilaterally along with some 

limited bibasilar crackles


Cardiac exam revealed the PMI to be normally situated and sized. The rhythm was 

regular and no extrasystoles were noted during several minutes of auscultation. 

The first and second heart sounds were normal and physiologic splitting of the 

second heart sound was noted. There were no murmurs, rubs, clicks, or gallops.


Abdominal exam revealed normal bowel sounds. The abdomen was soft, non-tender, 

and without masses, organomegaly, or appreciable enlargement of the abdominal 

aorta.


Examination of the extremities revealed easily palpable radial, femoral and 

pedal pulses. There was no cyanosis, clubbing or edema.


Examination of the skin revealed no evidence of significant rashes, suspicious 

appearing nevi or other concerning lesions.


Neurologically, the patient is awake and alert and the patient does not have any

focal neurological deficit.  Cranial nerves are essentially intact.





- Labs


CBC & Chem 7: 


                                 11/30/21 06:22





                                 11/30/21 06:22


Labs: 


                  Abnormal Lab Results - Last 24 Hours (Table)











  11/30/21 11/30/21 11/30/21 Range/Units





  06:22 06:22 06:22 


 


WBC  11.02 H    (4.50-10.00)  X 10*3/uL


 


RBC  5.41 H    (4.10-5.20)  X 10*6/uL


 


Hgb  15.8 H    (12.0-15.0)  g/dL


 


Hct  50.1 H    (37.2-46.3)  %


 


MCHC  31.5 L    (32.0-37.0)  g/dL


 


RDW  16.3 H    (11.5-14.5)  %


 


Plt Count  102 L    (140-440)  X 10*3/uL


 


Immature Gran #  0.12 H    (0.00-0.04)  X 10*3/uL


 


Neutrophils #  9.37 H    (1.80-7.70)  X 10*3/uL


 


Lymphocytes #  0.85 L    (0.90-5.00)  X 10*3/uL


 


Eosinophils #  0 L    (0.04-0.35)  X 10*3/uL


 


D-Dimer   0.61 H   (<0.60)  mg/L FEU


 


Anion Gap    9.10 L  (10.00-18.00)  mmol/L


 


BUN/Creatinine Ratio    24.14 H  (12.00-20.00)  Ratio


 


Glucose    124 H  ()  mg/dL


 


Calcium    8.6 L  (8.7-10.3)  mg/dL


 


Unconjugated Bilirubin    0.19 L  (0.20-1.00)  mg/dL


 


AST    41 H  (13-35)  U/L


 


Lactate Dehydrogenase    285 H  (120-246)  U/L


 


Albumin    3.6 L  (3.8-4.9)  g/dL


 


Albumin/Globulin Ratio    1.29 L  (1.60-3.17)  g/dL














Assessment and Plan


Plan: 





1 Acute COVID 19 related pneumonia.The patient has limited by the pulmonary 

infiltrates and the patient is slight hypoxia with mild elevation of the  

inflammatory markers which is essentially responding to steroids.  Noted the 

patient had some interval worsening in her shortness of breath and hypoxemia.  

The chest x-ray showed bilateral interstitial pulmonary infiltrates.  Clinically

related to CHF.  Progression of COVID 19 cannot be completely ruled out.  

Bacterial pneumonia is highly unlikely.  





2 acute hypoxic respiratory failure currently on 2 L of oxygen by nasal cannula





3 COPD and the patient has been stable at 19 any form of oxygen on outpatient 

basis





4 history of chronic atrial fibrillation, controlled rate and the patient is on 

long-term articulation with Eliquis





Plan


Agree on Lasix on a daily basis


Proceed with an echocardiogram to assess LV function


Continue Decadron


Continue anticoagulation with Eliquis


Wean FiO2 to maintain a saturation above 90% currently down to 2 L


Not ready for discharge yet.  We'll continue to follow.  I do not see any need 

for antibiotics at this point in time.

## 2021-11-30 NOTE — P.PN
Subjective


Progress Note Date: 11/30/21





Pt reported significant distress from dyspnea last night, specifically when she 

laid down to sleep.  She requested an increase in O2 rate and was increased from

2 to 3.5.  This morning she appears to be in moderate distress from dyspnea, and

relayed that she has previously had issues with heart failure; she is also a 

chronic a fib patient.  I placed an order for BNP, Echo, repeat CXR, and 40mg IV

lasix daily.  Also scheduled her inhaler therapies, and started azithromycin 

500mg daily.  CXR confirmed volume overload, and patient improved with lasix.  

Awaiting remainder of work up, but anticipate that if she remains stable on 2L 

or less by tomorrow she may go home with or without home O2 depending on need.  

This was discussed with patient and her son.





Objective





- Vital Signs


Vital signs: 


                                   Vital Signs











Temp  98.1 F   11/30/21 05:54


 


Pulse  72   11/30/21 05:54


 


Resp  19   11/30/21 07:52


 


BP  143/89   11/30/21 05:54


 


Pulse Ox  92 L  11/30/21 05:54








                                 Intake & Output











 11/29/21 11/30/21 11/30/21





 18:59 06:59 18:59


 


Output Total   800


 


Balance   -800


 


Output:   


 


  Urine   800


 


Other:   


 


  # Voids 1 1 














- Exam





Gen: awake, alert


HEENT: normocephalic, atraumatic, good hearing acuity, moist mucous membranes


Resp: good air exchange, breathing comfortably with no accessory muscle use


CVS: good distal perfusion x 4,


GI: soft, NTTP, ND


: no SPT, no CVAT, olmos catheter not present


MSK: no pitting edema, no clubbing


Neuro: non-focal, moving all extremities


Psych: cooperative, euthymic mood  





- Labs


CBC & Chem 7: 


                                 11/30/21 06:22





                                 11/30/21 06:22


Labs: 


                  Abnormal Lab Results - Last 24 Hours (Table)











  11/30/21 11/30/21 11/30/21 Range/Units





  06:22 06:22 06:22 


 


WBC  11.02 H    (4.50-10.00)  X 10*3/uL


 


RBC  5.41 H    (4.10-5.20)  X 10*6/uL


 


Hgb  15.8 H    (12.0-15.0)  g/dL


 


Hct  50.1 H    (37.2-46.3)  %


 


MCHC  31.5 L    (32.0-37.0)  g/dL


 


RDW  16.3 H    (11.5-14.5)  %


 


Plt Count  102 L    (140-440)  X 10*3/uL


 


Immature Gran #  0.12 H    (0.00-0.04)  X 10*3/uL


 


Neutrophils #  9.37 H    (1.80-7.70)  X 10*3/uL


 


Lymphocytes #  0.85 L    (0.90-5.00)  X 10*3/uL


 


Eosinophils #  0 L    (0.04-0.35)  X 10*3/uL


 


D-Dimer   0.61 H   (<0.60)  mg/L FEU


 


Anion Gap    9.10 L  (10.00-18.00)  mmol/L


 


BUN/Creatinine Ratio    24.14 H  (12.00-20.00)  Ratio


 


Glucose    124 H  ()  mg/dL


 


Calcium    8.6 L  (8.7-10.3)  mg/dL


 


Unconjugated Bilirubin    0.19 L  (0.20-1.00)  mg/dL


 


AST    41 H  (13-35)  U/L


 


Lactate Dehydrogenase    285 H  (120-246)  U/L


 


Albumin    3.6 L  (3.8-4.9)  g/dL


 


Albumin/Globulin Ratio    1.29 L  (1.60-3.17)  g/dL














Assessment and Plan


Assessment: 





Acute hypoxic respiratory failure


Covid pneumonitis


Supplemental oxygen as needed


Dexamethasone


Supportive care


Tylenol for fever


Motrin for pain


Monitor Vital signs


Cardiac monitoring


Pulmonary consult





Acute Diastolic Heart Failure


-echo pending


-lasix 40mg IV daily


-BNP pending


-CXR c/w volume overload





COPD


Continue inhalers q4h ATC


Azithromycin 500mg day 1/5


Counseled and oxygen as needed





Check fibrinogen and d-dimer, troponin proBNP, CRP, LDH, procalcitonin for 

prognostic evaluation





Permanent A. fib on anticoagulation


Resume Eliquis








Patient is full code


DVT prophylaxis currently on Eliquis for A. fib


Anticipated length of stay more than 2 midnights

## 2021-12-01 VITALS
SYSTOLIC BLOOD PRESSURE: 118 MMHG | TEMPERATURE: 98.7 F | RESPIRATION RATE: 17 BRPM | HEART RATE: 73 BPM | DIASTOLIC BLOOD PRESSURE: 69 MMHG

## 2021-12-01 LAB
ALBUMIN SERPL-MCNC: 3.9 G/DL (ref 3.8–4.9)
ALBUMIN/GLOB SERPL: 1.3 G/DL (ref 1.6–3.17)
ALP SERPL-CCNC: 73 U/L (ref 41–126)
ALT SERPL-CCNC: 47 U/L (ref 8–44)
ANION GAP SERPL CALC-SCNC: 13.8 MMOL/L (ref 10–18)
AST SERPL-CCNC: 41 U/L (ref 13–35)
BASOPHILS # BLD AUTO: 0.03 X 10*3/UL (ref 0–0.1)
BASOPHILS NFR BLD AUTO: 0.3 %
BILIRUB INDIRECT SERPL-MCNC: 0.34 MG/DL (ref 0.2–1)
BUN SERPL-SCNC: 19.1 MG/DL (ref 9–27)
BUN/CREAT SERPL: 23.88 RATIO (ref 12–20)
CALCIUM SPEC-MCNC: 9.1 MG/DL (ref 8.7–10.3)
CHLORIDE SERPL-SCNC: 102 MMOL/L (ref 96–109)
CO2 SERPL-SCNC: 22.2 MMOL/L (ref 20–27.5)
EOSINOPHIL # BLD AUTO: 0 X 10*3/UL (ref 0.04–0.35)
EOSINOPHIL NFR BLD AUTO: 0 %
ERYTHROCYTE [DISTWIDTH] IN BLOOD BY AUTOMATED COUNT: 5.59 X 10*6/UL (ref 4.1–5.2)
ERYTHROCYTE [DISTWIDTH] IN BLOOD: 15.9 % (ref 11.5–14.5)
GLOBULIN SER CALC-MCNC: 3 G/DL (ref 1.6–3.3)
GLUCOSE SERPL-MCNC: 111 MG/DL (ref 70–110)
HCT VFR BLD AUTO: 51.6 % (ref 37.2–46.3)
HGB BLD-MCNC: 16.1 G/DL (ref 12–15)
LDH SPEC-CCNC: 324 U/L (ref 120–246)
LYMPHOCYTES # SPEC AUTO: 1 X 10*3/UL (ref 0.9–5)
LYMPHOCYTES NFR SPEC AUTO: 9 %
MAGNESIUM SPEC-SCNC: 2.2 MG/DL (ref 1.5–2.4)
MCH RBC QN AUTO: 28.8 PG (ref 27–32)
MCHC RBC AUTO-ENTMCNC: 31.2 G/DL (ref 32–37)
MCV RBC AUTO: 92.3 FL (ref 80–97)
MONOCYTES # BLD AUTO: 0.76 X 10*3/UL (ref 0.2–1)
MONOCYTES NFR BLD AUTO: 6.9 %
NEUTROPHILS # BLD AUTO: 9.11 X 10*3/UL (ref 1.8–7.7)
NEUTROPHILS NFR BLD AUTO: 82.3 %
PLATELET # BLD AUTO: 116 X 10*3/UL (ref 140–440)
POTASSIUM SERPL-SCNC: 4.9 MMOL/L (ref 3.5–5.5)
PROT SERPL-MCNC: 6.9 G/DL (ref 6.2–8.2)
SODIUM SERPL-SCNC: 138 MMOL/L (ref 135–145)
WBC # BLD AUTO: 11.07 X 10*3/UL (ref 4.5–10)

## 2021-12-01 RX ADMIN — APIXABAN SCH MG: 5 TABLET, FILM COATED ORAL at 08:05

## 2021-12-01 RX ADMIN — ALBUTEROL SULFATE SCH: 90 AEROSOL, METERED RESPIRATORY (INHALATION) at 16:20

## 2021-12-01 RX ADMIN — ALBUTEROL SULFATE SCH PUFF: 90 AEROSOL, METERED RESPIRATORY (INHALATION) at 08:06

## 2021-12-01 RX ADMIN — METOPROLOL TARTRATE SCH MG: 50 TABLET, FILM COATED ORAL at 08:05

## 2021-12-01 RX ADMIN — ALBUTEROL SULFATE SCH: 90 AEROSOL, METERED RESPIRATORY (INHALATION) at 04:03

## 2021-12-01 RX ADMIN — ALBUTEROL SULFATE SCH: 90 AEROSOL, METERED RESPIRATORY (INHALATION) at 12:16

## 2021-12-01 RX ADMIN — DILTIAZEM HYDROCHLORIDE SCH MG: 120 CAPSULE, COATED, EXTENDED RELEASE ORAL at 08:07

## 2021-12-01 RX ADMIN — CEFAZOLIN SCH MLS/HR: 330 INJECTION, POWDER, FOR SOLUTION INTRAMUSCULAR; INTRAVENOUS at 05:47

## 2021-12-01 RX ADMIN — PANTOPRAZOLE SODIUM SCH MG: 40 TABLET, DELAYED RELEASE ORAL at 08:06

## 2021-12-01 RX ADMIN — TIOTROPIUM BROMIDE INHALATION SPRAY SCH PUFF: 3.12 SPRAY, METERED RESPIRATORY (INHALATION) at 08:06

## 2021-12-01 RX ADMIN — FUROSEMIDE SCH MG: 10 INJECTION, SOLUTION INTRAMUSCULAR; INTRAVENOUS at 08:07

## 2021-12-01 RX ADMIN — DEXTROSE SCH MG: 50 INJECTION, SOLUTION INTRAVENOUS at 08:06

## 2021-12-01 RX ADMIN — AZITHROMYCIN SCH MG: 500 TABLET, FILM COATED ORAL at 08:06

## 2021-12-01 RX ADMIN — ALBUTEROL SULFATE SCH PUFF: 90 AEROSOL, METERED RESPIRATORY (INHALATION) at 00:48

## 2021-12-01 RX ADMIN — AMIODARONE HYDROCHLORIDE SCH MG: 200 TABLET ORAL at 08:05

## 2021-12-01 NOTE — P.CRDCN
History of Present Illness


History of present illness: 


HISTORY OF PRESENTING ILLNESS


This is a pleasant 66-year-old female past medical history significant for 

chronic persistent-fibrillation on Eliquis, COPD, GERD. She follows in the 

office with Dr. Wei Kemp and Dr. Menjivar and Dr. Carter is her 

Electrophysiologist all out of [place]. We have been asked to see in 

consultation for "arrhythmias". Patient presents emergency department 11/27/2021

with shortness of breath for 2 days. Also with symptoms of tiredness, not 

sleeping well, cough, rhinnorrhea.  Patient was found to be Covid 19 positive, 

patient is not vaccinated against COVID.  


She states she follows closely with her cardiologist and electrophysiologist. 

She is scheduled to undergo an ablation this month with Dr. Carter. She denies 

history of MI, CAD, diabetes. 





EKG revealed atrial fibrillation with controlled ventricular rates, no 

significant ST-T wave abnormalities.  


Telemetry reviewed patient in atrial fibrillation with controlled ventricular 

rates, she appears to have episodes of aberrancy, bekah phenomenon. 


Echocardiogram performed which revealed an EF of 4045%, RV is moderately 

enlarged, mild mitral regurgitation, mild tricuspid regurgitation. 


Patient currently maintained on amiodarone 200 mg daily, Eliquis 5 mg twice a 

day, Cardizem 120 mg daily, IV Lasix 40mg daily, metoprolol tartrate 100 mg 

twice a day and IV Fluids at 75cc/hr. 





REVIEW OF SYSTEMS


At the time of my exam:


CONSTITUTIONAL: Denies fever or chills. +Generalized fatigue. 


CARDIOVASCULAR: +shortness of breath Denies chest pain, orthopnea, PND or 

palpitations.


RESPIRATORY: + cough. 


GASTROINTESTINAL: Denies abdominal pain, diarrhea, constipation, nausea or 

vomiting.


MUSCULOSKELETAL: Denies myalgias.


NEUROLOGIC: Denies numbness, tingling, headacbe or weakness.


ENDOCRINE: Denies fatigue, weight change,  polydipsia or polyurina.


GENITOURINARY: Denies burning, hematuria or urgency with micturation.


HEMATOLOGIC: Denies history of anemia or bleeding. 





PHYSICAL EXAMINATION


Blood pressure 144/94, heart rate 70, afebrile, saturations greater than 92% on 

2 L nasal cannula


Full physical exam not performed to limit COVID-19 exposure 


CONSTITUTIONAL: No apparent distress. 


HEENT: Head is normocephalic. Pupils are equal, round. No JVD 


ABDOMEN: Soft, nontender. 


EXTREMITIES: 2+ peripheral pulses, no lower extremity edema


NEUROLOGIC EXAMINATION: Patient is awake, alert and oriented x3. 





ASSESSMENT


Chronic persistent atrial fibrillation with abberrant ventricular conduction 


COVID-19 Pneumonia


COPD


Cardiomyopathy with EF mildly impaired 40-45%, unknown prior EF. Likely non-

ischemic 





PLAN


From a cardiology perspective, no further work up indicated at this time. Shirin

nue home Eliquis, amiodarone, Cardizem and metoprolol tartrate.


Recommend patient follow up with Dr. Menjivar and Dr. Carter as scheduled 

outpatient.  


We will follow the patient as needed. Please reach out with further questions or

concerns. 











Nurse Practitioner note has been reviewed, I agree with a documented findings 

and plan of care.  Patient was seen and examined.











Past Medical History


Past Medical History: Atrial Fibrillation, COPD


History of Any Multi-Drug Resistant Organisms: None Reported


Past Surgical History: Ablation


Additional Past Surgical History / Comment(s): Brain aneursym 3/3/2020


Past Psychological History: No Psychological Hx Reported


Smoking Status: Former smoker


Past Alcohol Use History: None Reported


Past Drug Use History: None Reported





- Past Family History


  ** Father


Family Medical History: Myocardial Infarction (MI), Rheumatoid Arthritis (RA)





Medications and Allergies


                                Home Medications











 Medication  Instructions  Recorded  Confirmed  Type


 


Albuterol Sulfate [Ventolin HFA] 2 puff INHALATION RT-Q6H PRN 12/22/20 11/27/21 

History


 


Ondansetron Odt [Zofran Odt] 4 mg PO Q8HR PRN #10 tab 12/22/20 11/27/21 Rx


 


Pantoprazole Sodium [Protonix] 40 mg PO DAILY 12/22/20 11/27/21 History


 


busPIRone HCl [Buspar] 10 mg PO TID 12/22/20 11/27/21 History


 


Amiodarone [Cordarone] 200 mg PO DAILY 11/27/21 11/27/21 History


 


Apixaban [Eliquis] 5 mg PO BID 11/27/21 11/27/21 History


 


Diltiazem HCl [Diltiazem HCl 24Hr 120 mg PO DAILY 11/27/21 11/27/21 History





ER (CD)]    


 


Ipratropium Bromide [Atrovent Hfa] 2 puff INHALATION RT-QID 11/27/21 11/27/21 H

istory


 


Metoprolol Tartrate [Lopressor] 100 mg PO BID 11/27/21 11/27/21 History


 


Mirtazapine [Remeron] 45 mg PO HS 11/27/21 11/27/21 History








                                    Allergies











Allergy/AdvReac Type Severity Reaction Status Date / Time


 


amoxicillin [From Augmentin] Allergy  Unknown Verified 11/27/21 18:10


 


clavulanic acid Allergy  Unknown Verified 11/27/21 18:10





[From Augmentin]     


 


codeine Allergy  Unknown Verified 11/27/21 18:10


 


Tetracyclines Allergy  Unknown Verified 11/27/21 18:10














Physical Exam


Vitals: 


                                   Vital Signs











  Temp Pulse Resp BP BP Pulse Ox


 


 12/01/21 10:09  97.8 F  70  18  144/94   93 L


 


 12/01/21 08:06       94 L


 


 12/01/21 06:17  97.9 F  77  19  143/77   93 L


 


 12/01/21 02:29  97.6 F  70  21  105/66   93 L


 


 11/30/21 22:41  97.5 F L  61  20  132/88   93 L


 


 11/30/21 20:00   51 L  17   


 


 11/30/21 18:37  98.2 F  51 L  17   171/96  90 L


 


 11/30/21 14:00  98.4 F  63  16   122/75  94 L








                                Intake and Output











 11/30/21 12/01/21 12/01/21





 22:59 06:59 14:59


 


Output Total 2100  600


 


Balance -2100  -600


 


Output:   


 


  Urine 2100  600


 


Other:   


 


  # Voids 1  














Results





                                 12/01/21 06:33





                                 12/01/21 06:33


                               Current Medications











Generic Name Dose Route Start Last Admin





  Trade Name Freq  PRN Reason Stop Dose Admin


 


Acetaminophen  650 mg  11/27/21 17:32  11/28/21 19:30





  Acetaminophen Tab 325 Mg Tab  PO   650 mg





  Q6HR PRN   Administration





  Mild Pain or Fever > 100.5  


 


Albuterol Sulfate  2 puff  11/30/21 09:15  12/01/21 08:06





  Albuterol Hfa Inhaler  INHALATION   2 puff





  RT-Q4H ANASTASIIA   Administration


 


Amiodarone HCl  200 mg  11/28/21 09:00  12/01/21 08:05





  Amiodarone 200 Mg Tab  PO   200 mg





  DAILY ANASTASIIA   Administration


 


Apixaban  5 mg  11/28/21 01:15  12/01/21 08:05





  Apixaban 5 Mg Tab  PO   5 mg





  BID ANASTASIIA   Administration





  Protocol  


 


Azithromycin  500 mg  11/30/21 09:15  12/01/21 08:06





  Azithromycin 500 Mg Tab  PO   500 mg





  DAILY ANASTASIIA   Administration


 


Buspirone HCl  10 mg  11/28/21 01:15  12/01/21 08:06





  Buspirone Hcl 10 Mg Tab  PO   10 mg





  TID ANASTASIIA   Administration


 


Dexamethasone Sodium Phosphate  6 mg  11/28/21 09:00  12/01/21 08:06





  Dexamethasone Sod Phosphate 10 Mg/Ml 1 Ml Vial  IV  12/07/21 09:01  6 mg





  DAILY ANASTASIIA   Administration


 


Diltiazem HCl  120 mg  11/28/21 09:00  12/01/21 08:07





  Diltiazem Cd 120 Mg Cap.Er.24h  PO   120 mg





  DAILY ANASTASIIA   Administration


 


Furosemide  40 mg  11/30/21 09:15  12/01/21 08:07





  Furosemide 10 Mg/Ml 4 Ml Vial  IV   40 mg





  DAILY ANASTASIIA   Administration


 


Sodium Chloride  1,000 mls @ 75 mls/hr  11/27/21 17:45  12/01/21 05:47





  Saline 0.9%  IV   75 mls/hr





  .[id number] ANASTASIIA   Administration


 


Ibuprofen  400 mg  11/27/21 17:32 





  Ibuprofen 400 Mg Tab  PO  





  Q6HR PRN  





  Mild Pain or Fever > 100.5  


 


Metoprolol Tartrate  100 mg  11/28/21 01:15  12/01/21 08:05





  Metoprolol Tartrate 50 Mg Tab  PO   100 mg





  BID ANASTASIIA   Administration


 


Mirtazapine  45 mg  11/28/21 01:30  11/30/21 20:49





  Mirtazapine 45 Mg Tablet  PO   45 mg





  HS ANASTASIIA   Administration


 


Naloxone HCl  0.2 mg  11/27/21 17:32 





  Naloxone 0.4 Mg/Ml 1 Ml Vial  IV  





  Q2M PRN  





  Opioid Reversal  


 


Pantoprazole Sodium  40 mg  11/28/21 09:00  12/01/21 08:06





  Pantoprazole 40 Mg Tablet  PO   40 mg





  DAILY ANASTASIIA   Administration


 


Tiotropium Bromide  2 puff  11/30/21 10:00  12/01/21 08:06





  Tiotropium 2.5 Mcg Inhaler  INHALATION   2 puff





  RT-DAILY ANASTASIIA   Administration








                                Intake and Output











 11/30/21 12/01/21 12/01/21





 22:59 06:59 14:59


 


Output Total 2100  600


 


Balance -2100  -600


 


Output:   


 


  Urine 2100  600


 


Other:   


 


  # Voids 1  








                                        





                                 11/30/21 06:22 





                                 11/30/21 06:22

## 2021-12-01 NOTE — P.PN
Subjective


Progress Note Date: 12/01/21


Principal diagnosis: 


 Dyspnea








66-year-old female patient with known history of COPD presented to the hospital 

because of worsening shortness of breath and feeling sick and fatigued and the 

patient was diagnosed having COVID 19 related pneumonia.  Noted the patient was 

not vaccinated for COVID 19.  She did not have any other symptoms.  Denies 

having any nausea or vomiting or diarrhea.  Denies having any fever or chills.  

The patient was seen in the emergency department.  The chest x-ray showed 

bilateral pulmonary infiltrates.  The patient was afebrile.  The patient was 

hemodynamically stable.  Pulse ox was low on the 90% and the patient was placed 

initially on 4 L and currently she is on 2liters  by nasal cannula.  The 

patient's d-dimer is at 0.36, the patient had a LDH level of 316 which is lower 

compared to 793 at a time of admission and the CRP level is less than 0.3.  The 

patient currently is on Decadron 6 mg IV every 24 hours.  The patient is also on

long-term medical condition with Eliquis nontender the patient has history of 

atrial fibrillation.  The patient remains on amiodarone and metoprolol regarding

her atrial fib.





On 11/30/2021, the patient is being seen for a follow-up.  I saw her in 

consultation yesterday for COVID 19 related pneumonia.  The patient was being 

treated with Decadron.  Noted the inflammatory markers were essentially normal 

including LDH and CRP.  She is also known to have chronic atrial fibrillation.  

She lives in the [facility] and she was visiting in [place] with her son 

when she ended up being sick and she was hospitalized.  Overnight, the patient 

had some worsening shortness of breath.  Her oxygen level dropped and the 

patient had to be placed on oxygen at 3.5 L.  Her current pulse ox in the order 

of 93-94%.  The chest x-ray done at that time showed CHF/pulmonary edema and for

that reason, the patient was given Lasix at a dose of 40 mg IV push which helped

her with diuresis.  She is resting comfortably at this point in time.  

Echocardiogram is to be completed.  Meanwhile, the patient underwent further 

blood work that showed a d-dimer of 0.6 and the LDH level was down to 285 and a 

CRP level is less than 0.3.  Hemoglobin is at 15.8 with a white cell count of 

11.2.  For now, the patient is on Lasix 40 mg IV every 24 hours she remains on 

Decadron.  She felt better.  She is a 92% pulse ox with oxygen flow of 3 L.





On 12/01/2021 patient seen in follow-up on medical surgical floor.  She is 

breathing much easier, doing significantly better since admission, she is on 

room air, pulse ox is 93-97%, she has had no acute events on a, no fever or 

chills, she's been tolerating ambulation.  No complaints of chest discomfort, no

cough, no worsening dyspnea.  Her last chest x-ray from 11/30/2021 showed 

diffuse increased lung markings, and streaky right lower lobe opacity likely 

related to atelectasis.  Patient was treated with a combination of  inhaled 

bronchodilators, Eliquis, she received a dose of Lasix this morning, and she was

given steroids.  She has diabetes, she is in -2.99 fluid balance over the last 

24 hours.  His labs have been reviewed, blood cell count is stable at 11 0.7, 

hemoglobin is 16.1, platelet count is 116, d-dimer is 0.52, electrolytes are 

within normal limits, LDH is 324, slightly increased compared to yesterday's 

value but overall improved since admission, CRP is less than 0.30, urinalysis 

showed no evidence of infection. Patient remains in atrial fibrillation which is

chronic and persistent for her, she is maintained on amiodarone 200 mg daily, 

and Eliquis 5 mg daily, she is also on Cardizem  mg daily.  In addition to

metoprolol 100 mg twice daily.  Her each of fibrillation has been controlled.,  

Blood pressure is stable





Objective





- Vital Signs


Vital signs: 


                                   Vital Signs











Temp  98.7 F   12/01/21 14:22


 


Pulse  73   12/01/21 14:22


 


Resp  17   12/01/21 14:22


 


BP  118/69   12/01/21 14:22


 


Pulse Ox  97   12/01/21 14:22








                                 Intake & Output











 11/30/21 12/01/21 12/01/21





 18:59 06:59 18:59


 


Output Total 2600 300 600


 


Balance -2600 -300 -600


 


Output:   


 


  Urine 2600 300 600


 


Other:   


 


  # Voids  1 














- Exam


 GENERAL EXAM: Alert, very pleasant, 66-year-old white female, on room air, with

pulse ox of 93-97.  comfortable in no apparent distress.


HEAD: Normocephalic/atraumatic.


EYES: Normal reaction of pupils, equal size.  Conjunctiva pink, sclera white.


NOSE: Clear with pink turbinates.


THROAT: No erythema or exudates.


NECK: No masses, no JVD, no thyroid enlargement, no adenopathy.


CHEST: No chest wall deformity.  Symmetrical expansion. 


LUNGS: Equal air entry with crackles at the bases


CVS: Regular rate and rhythm, normal S1 and S2, no gallops, no murmurs, no rubs


ABDOMEN: Soft, nontender.  No hepatosplenomegaly, normal bowel sounds, no guardi

ng or rigidity.


EXTREMITIES: No clubbing, no edema, no cyanosis, 2+ pulses and upper and lower 

extremities.


MUSCULOSKELETAL: Muscle strength and tone normal.


SPINE: No scoliosis or deformity


SKIN: No rashes


CENTRAL NERVOUS SYSTEM: Alert and oriented -3.  No focal deficits, tone is 

normal in all 4 extremities.


PSYCHIATRIC: Alert and oriented -3.  Appropriate affect.  Intact judgment and 

insight.











- Labs


CBC & Chem 7: 


                                 12/01/21 06:33





                                 12/01/21 06:33


Labs: 


                  Abnormal Lab Results - Last 24 Hours (Table)











  11/30/21 12/01/21 12/01/21 Range/Units





  06:22 06:33 06:33 


 


WBC   11.07 H   (4.50-10.00)  X 10*3/uL


 


RBC   5.59 H   (4.10-5.20)  X 10*6/uL


 


Hgb   16.1 H   (12.0-15.0)  g/dL


 


Hct   51.6 H   (37.2-46.3)  %


 


MCHC   31.2 L   (32.0-37.0)  g/dL


 


RDW   15.9 H   (11.5-14.5)  %


 


Plt Count   116 L   (140-440)  X 10*3/uL


 


MPV   12.3 H   (9.5-12.2)  fL


 


Immature Gran #   0.17 H   (0.00-0.04)  X 10*3/uL


 


Neutrophils #   9.11 H   (1.80-7.70)  X 10*3/uL


 


Eosinophils #   0 L   (0.04-0.35)  X 10*3/uL


 


BUN/Creatinine Ratio    23.88 H  (12.00-20.00)  Ratio


 


Glucose    111 H  ()  mg/dL


 


AST    41 H  (13-35)  U/L


 


ALT    47 H  (8-44)  U/L


 


Lactate Dehydrogenase    324 H  (120-246)  U/L


 


NT-Pro-B Natriuret Pep  2248 H    (0-125)  pg/mL


 


Albumin/Globulin Ratio    1.30 L  (1.60-3.17)  g/dL














Assessment and Plan


Plan: 


 Assessment:





#1.  Acute COVID-19 related pneumonia.  Chest x-ray showed bilateral 

interstitial pulmonary infiltrates, with the possibility of acute CHF 

exacerbation.  Patient was treated with steroids, inhaled bronchodilators, and 

diuretics and clinically patient had a good response to treatment





#2.  Acute hypoxic respiratory failure related to the above, improved and 

patient is currently on room air with pulse ox of 93-97%





#3.  History of COPD and patient has not oxygen dependent a baseline





#4.  History of chronic atrial fibrillation, controlled, and patient is 

maintained on oral amiodarone, metoprolol, and Eliquis





Plan:





Patient is on room air,


Breathing much easier


Vital signs have been stable


Patient is tolerating ambulation


A. fib has been controlled, patient continues on Eliquis


She is had no acute events overnight


She stable for discharge home from [facility]


She can finish a total 10 day course of oral Decadron


She can resume her maintenance inhalers and nebulized treatments


She can follow up with her PCP at the place of her residence and if she still in

the area she can certainly follow up with Dr. Mathew in 2 weeks if she wishes





I performed a history & physical examination of the patient and discussed their 

management with my nurse practitioner, Florinda Adams.  I reviewed the nurse 

practitioner's note and agree with the documented findings and plan of care.  

Lung sounds are positive for basilar rales  throughout the lung fields.  The 

findings and the impression was discussed with the patient.  I attest to the 

documentation by the nurse practitioner. 





Time with Patient: Less than 30

## 2021-12-01 NOTE — P.DS
Providers


Date of admission: 


11/27/21 17:57





Expected date of discharge: 12/01/21


Attending physician: 


Katlyn Bearden MD





Consults: 





                                        





11/29/21 11:42


Consult Physician Routine 


   Consulting Provider: Sydni Mathew


   Consult Reason/Comments: covid


   Do you want consulting provider notified?: Yes





11/30/21 13:57


Consult Physician Routine 


   Consulting Provider: Sydni Mathew


   Consult Reason/Comments: COVID


   Do you want consulting provider notified?: Already Contacted





12/01/21 00:11


Consult Physician Routine 


   Consulting Provider: [facility]


   Consult Reason/Comments: Arrhythmias


   Do you want consulting provider notified?: Yes, Notify in am











Primary care physician: 


Physician Nonstaff





Hospital Course: 





Discharge Diagnosis:





Acute hypoxic respiratory failure, multifactorial secondary to Covid pneumonitis

on top of COPD exacerbation and acute diastolic heart failure





Covid pneumonitis in an unvaccinated patient





Acute Diastolic Heart Failure





COPD with acute exacerbation





Polycythemia





Hyponatremia





Permanent atrial fibrillation on anticoagulation with Eliquis, please follow-up 

as scheduled with your cardiologist and electrophysiologist Dr. Menjivar and 

Dr. Catrer.





Hypertension, controlled





Hospital Course: 





Patient is a 66-year-old female with a past medical history of hypertension, 

permanent atrial fibrillation on anticoagulation with Eliquis, and COPD not on 

home oxygen dependent.  She presented to the hospital on 11/27/21 with a chief 

complaint of shortness of breath and fatigue 4 days.  She was seen and fully 

evaluated in the emergency department. Initial chest x-ray showing mild 

pulmonary interstitial infiltrates concerning for fluid overload and mild car

diomegaly.  EKG revealed atrial fibrillation with a controlled ventricular rate 

at 85 bpm.  Labs obtained with CBC revealing polycythemia with hemoglobin of 

18.6 and thrombocytopenia with platelet count of 112, BMP revealing mild 

hyponatremia with sodium of 132 otherwise no significant abnormalities, 

troponins trended 3 at 0.022, 0.017, and 0.021.  ProBNP was 1120.  CRP was 1.6,

, and d-dimer of 0.45.  Patient tested positive for Covid 19 virus.  

Patient was admitted under our services for acute diastolic heart failure and 

acute hypoxic respiratory failure resulting from multifactorial issues including

Covid pneumonitis, COPD exacerbation, and acute diastolic heart failure.  

Patient treated with steroids, azithromycin, diuresis and supplemental oxygen.  

She was seen and evaluated by cardiology recommending that she follow-up with 

her cardiologist and electrophysiologist, Dr. Menjivar and Dr. Carter 

outpatient as discussed for previously scheduled ablation. Patient's condition 

improved and she was weaned off of oxygen.  Pulse ox 93-94% on room air and 90% 

with ambulation.  Patient is stable for discharge at this time.  Educated 

patient on need to follow up with her primary care provider in [place] as well

as following up with cardiologist and electrophysiologist as previously 

scheduled.  Patient being discharged home on remainder of 4 days of azithromycin

totaling a 5 day course, Lasix 40 mg by mouth daily, and Decadron 6 mg daily for

an additional 6 days to total a 10 day course.  Patient is stable for discharge 

at this time.  Patient given lab prescription to have BMP drawn in 3 days for 

continued monitoring of renal function and electrolytes while diuresing.





Patient seen and examined at bedside.  She reports, "I am ready to get out of 

here".  Patient denies having any headache, lightheadedness, dizziness, chest 

pain, palpitations, shortness of breath at rest or with exertion.  She continues

to have a slight cough but denies any sputum production.  Patient also denies 

having any abdominal pain, nausea, vomiting, or experiencing any 

numbness/tingling/weakness in her extremities.  Patient reports feeling much 

better since being diuresed.





Vital signs reviewed and stable. 


General: Nontoxic, no distress and appears stated age.  Obese


Derm: Skin warm and dry, normal coloration for ethnicity.


Head: Atraumatic, normocephalic and symmetric.  


Eyes: EOMs intact, no lid lag, and anicteric sclera


Mouth: no lip lesions, mucus membranes moist


Cardiovascular: regular rate and rhythm with normal S1S2, no murmur, positive 

posterior tibial pulses bilaterally, and cap refill < 2 seconds.  


Lungs: Respirations even, regular, and unlabored on room air. Lungs CTA 

bilaterally, no rhonchi, no rales, no wheezing, and no accessory muscle usage. 


Abdominal: soft, nontender to palpation, no guarding, no appreciable 

organomegaly


Ext: ROM intact. No gross muscle atrophy, no edema, no contractures


Neuro: Speech clear, face symmetrical and CN II-XII grossly intact with no noted

focal neuro deficits


Psych: Alert and oriented to person, place, time, and situation. Appropriate and

pleasant affect. 





A total of 45 minutes of time were spent preparing this complex discharge 

summary.








Patient Condition at Discharge: Stable





Plan - Discharge Summary


Discharge Rx Participant: No


New Discharge Prescriptions: 


New


   Dexamethasone [Decadron] 6 mg PO DAILY 6 Days #6 tablet


   Azithromycin [Zithromax] 500 mg PO DAILY 4 Days #4 tab


   Furosemide [Lasix] 40 mg PO DAILY 30 Days #30 tablet





Continue


   Pantoprazole Sodium [Protonix] 40 mg PO DAILY


   Albuterol Sulfate [Ventolin HFA] 2 puff INHALATION RT-Q6H PRN


     PRN Reason: Shortness Of Breath


   busPIRone HCl [Buspar] 10 mg PO TID


   Ondansetron Odt [Zofran ODT] 4 mg PO Q8HR PRN #10 tab


     PRN Reason: Nausea


   Apixaban [Eliquis] 5 mg PO BID


   Mirtazapine [Remeron] 45 mg PO HS


   Ipratropium Bromide [Atrovent Hfa] 2 puff INHALATION RT-QID


   Amiodarone [Cordarone] 200 mg PO DAILY


   Metoprolol Tartrate [Lopressor] 100 mg PO BID


   Diltiazem HCl [Diltiazem HCl 24Hr ER (CD)] 120 mg PO DAILY


Discharge Medication List





Albuterol Sulfate [Ventolin HFA] 2 puff INHALATION RT-Q6H PRN 12/22/20 [History]


Ondansetron Odt [Zofran ODT] 4 mg PO Q8HR PRN #10 tab 12/22/20 [Rx]


Pantoprazole Sodium [Protonix] 40 mg PO DAILY 12/22/20 [History]


busPIRone HCl [Buspar] 10 mg PO TID 12/22/20 [History]


Amiodarone [Cordarone] 200 mg PO DAILY 11/27/21 [History]


Apixaban [Eliquis] 5 mg PO BID 11/27/21 [History]


Diltiazem HCl [Diltiazem HCl 24Hr ER (CD)] 120 mg PO DAILY 11/27/21 [History]


Ipratropium Bromide [Atrovent Hfa] 2 puff INHALATION RT-QID 11/27/21 [History]


Metoprolol Tartrate [Lopressor] 100 mg PO BID 11/27/21 [History]


Mirtazapine [Remeron] 45 mg PO HS 11/27/21 [History]


Azithromycin [Zithromax] 500 mg PO DAILY 4 Days #4 tab 12/01/21 [Rx]


Dexamethasone [Decadron] 6 mg PO DAILY 6 Days #6 tablet 12/01/21 [Rx]


Furosemide [Lasix] 40 mg PO DAILY 30 Days #30 tablet 12/01/21 [Rx]








Follow up Appointment(s)/Referral(s): 


Nonstaff,Physician [Primary Care Provider] - 1-2 days


Ambulatory/Diagnostic Orders: 


Comprehensive Metabolic Panel [LAB.AMB] Time Frame: 3 Days, Location: None 

Selected


Activity/Diet/Wound Care/Special Instructions: 





Activity:





As tolerated.  Take breaks as needed.





Diet: 





Heart healthy and carb consistent diet. Avoid salts, or foods with hidden salts 

such as canned or boxed foods and frozen dinners. Extra salt makes your heart 

work harder and traps the fluid in your body for longer.





Special Instructions:  





Take all of your medications as directed and remember to keep all of your 

doctor's appointments and follow-up as needed.  





Weigh yourself every morning after you urinate. If you gain 3 pounds overnight 

or more than 5 pounds in one week, call your primary physician and cardiologist 

for guidance on your medications or they may want to see you in their office. 

Keep a daily log of your weights and be sure to bring with you at follow up 

visits with your PCP and cardiologist.





Take all of your medications as directed, especially your water pills. NEVER 

skip a dose.  And remember to keep all of your doctor's appointments and follow-

up as needed.





Elevate your legs when you are not up moving around to help with circulation and

 prevent swelling. Compression stockings are also a great way to improve lower 

extremity circulation and prevent/improve lower extremity edema. 





Call your primary care provider and cardiologist if you notice any extra 

swelling in your legs, ankles, feet or abdomen, if you have a new dry cough, if 

your shortness of breath worsens with activity or at rest, or if you feel more 

fatigued.





Please follow-up with your primary care provider as well as your cardiologist 

and electrophysiologist, Dr. Menjivar and Dr. Carter outpatient as discussed

 for previously scheduled ablation. 





Remember to continue social isolation 14 days from positive test and must be 

symptom-free for 2 days, earliest time out of isolation being 12/11/21.





Thank you for allowing us to participate in your care, it was truly a pleasure 

having you for our patient!!! 








Discharge Disposition: HOME SELF-CARE

## 2022-06-07 ENCOUNTER — OFFICE (OUTPATIENT)
Dept: URBAN - NONMETROPOLITAN AREA CLINIC 13 | Facility: CLINIC | Age: 67
End: 2022-06-07

## 2022-06-07 ENCOUNTER — OFFICE (OUTPATIENT)
Dept: URBAN - NONMETROPOLITAN AREA CLINIC 13 | Facility: CLINIC | Age: 67
End: 2022-06-07
Payer: COMMERCIAL

## 2022-06-07 VITALS
HEIGHT: 64 IN | OXYGEN SATURATION: 96 % | SYSTOLIC BLOOD PRESSURE: 143 MMHG | HEART RATE: 57 BPM | WEIGHT: 192 LBS | SYSTOLIC BLOOD PRESSURE: 147 MMHG | DIASTOLIC BLOOD PRESSURE: 82 MMHG | DIASTOLIC BLOOD PRESSURE: 85 MMHG

## 2022-06-07 VITALS — HEIGHT: 64 IN

## 2022-06-07 DIAGNOSIS — K76.0 FATTY (CHANGE OF) LIVER, NOT ELSEWHERE CLASSIFIED: ICD-10-CM

## 2022-06-07 PROCEDURE — 76705 ECHO EXAM OF ABDOMEN: CPT | Mod: TC | Performed by: NURSE PRACTITIONER

## 2022-06-07 PROCEDURE — 99213 OFFICE O/P EST LOW 20 MIN: CPT | Mod: 25 | Performed by: NURSE PRACTITIONER

## 2022-12-22 ENCOUNTER — HOSPITAL ENCOUNTER (EMERGENCY)
Dept: HOSPITAL 47 - EC | Age: 67
LOS: 1 days | Discharge: OTHER INSTITUTION | End: 2022-12-23
Payer: MEDICARE

## 2022-12-22 VITALS — TEMPERATURE: 98.4 F

## 2022-12-22 DIAGNOSIS — Z88.5: ICD-10-CM

## 2022-12-22 DIAGNOSIS — D64.9: ICD-10-CM

## 2022-12-22 DIAGNOSIS — Z20.822: ICD-10-CM

## 2022-12-22 DIAGNOSIS — J44.1: Primary | ICD-10-CM

## 2022-12-22 DIAGNOSIS — Z88.0: ICD-10-CM

## 2022-12-22 DIAGNOSIS — I50.9: ICD-10-CM

## 2022-12-22 DIAGNOSIS — Z88.8: ICD-10-CM

## 2022-12-22 DIAGNOSIS — R09.02: ICD-10-CM

## 2022-12-22 DIAGNOSIS — J18.9: ICD-10-CM

## 2022-12-22 DIAGNOSIS — Z87.891: ICD-10-CM

## 2022-12-22 DIAGNOSIS — I48.91: ICD-10-CM

## 2022-12-22 DIAGNOSIS — J90: ICD-10-CM

## 2022-12-22 DIAGNOSIS — K92.2: ICD-10-CM

## 2022-12-22 DIAGNOSIS — Z79.82: ICD-10-CM

## 2022-12-22 DIAGNOSIS — Z88.1: ICD-10-CM

## 2022-12-22 DIAGNOSIS — Z79.899: ICD-10-CM

## 2022-12-22 LAB
ALBUMIN SERPL-MCNC: 3.8 G/DL (ref 3.5–5)
ALP SERPL-CCNC: 82 U/L (ref 38–126)
ALT SERPL-CCNC: 15 U/L (ref 4–34)
ANION GAP SERPL CALC-SCNC: 14 MMOL/L
APTT BLD: 22.3 SEC (ref 22–30)
AST SERPL-CCNC: 29 U/L (ref 14–36)
BASOPHILS # BLD AUTO: 0.1 K/UL (ref 0–0.2)
BASOPHILS NFR BLD AUTO: 1 %
BUN SERPL-SCNC: 36 MG/DL (ref 7–17)
CALCIUM SPEC-MCNC: 8.7 MG/DL (ref 8.4–10.2)
CHLORIDE SERPL-SCNC: 99 MMOL/L (ref 98–107)
CO2 SERPL-SCNC: 24 MMOL/L (ref 22–30)
EOSINOPHIL # BLD AUTO: 0.1 K/UL (ref 0–0.7)
EOSINOPHIL NFR BLD AUTO: 1 %
ERYTHROCYTE [DISTWIDTH] IN BLOOD BY AUTOMATED COUNT: 3.85 M/UL (ref 3.8–5.4)
ERYTHROCYTE [DISTWIDTH] IN BLOOD: 18.9 % (ref 11.5–15.5)
GLUCOSE SERPL-MCNC: 116 MG/DL (ref 74–99)
HCT VFR BLD AUTO: 27.4 % (ref 34–46)
HGB BLD-MCNC: 7.5 GM/DL (ref 11.4–16)
INR PPP: 1.1 (ref ?–1.2)
LYMPHOCYTES # SPEC AUTO: 2 K/UL (ref 1–4.8)
LYMPHOCYTES NFR SPEC AUTO: 19 %
MAGNESIUM SPEC-SCNC: 2.3 MG/DL (ref 1.6–2.3)
MCH RBC QN AUTO: 19.5 PG (ref 25–35)
MCHC RBC AUTO-ENTMCNC: 27.4 G/DL (ref 31–37)
MCV RBC AUTO: 71.4 FL (ref 80–100)
MONOCYTES # BLD AUTO: 0.8 K/UL (ref 0–1)
MONOCYTES NFR BLD AUTO: 8 %
NEUTROPHILS # BLD AUTO: 7.2 K/UL (ref 1.3–7.7)
NEUTROPHILS NFR BLD AUTO: 68 %
PLATELET # BLD AUTO: 190 K/UL (ref 150–450)
POTASSIUM SERPL-SCNC: 3.8 MMOL/L (ref 3.5–5.1)
PROT SERPL-MCNC: 6.9 G/DL (ref 6.3–8.2)
PT BLD: 11.6 SEC (ref 9–12)
SODIUM SERPL-SCNC: 137 MMOL/L (ref 137–145)
WBC # BLD AUTO: 10.7 K/UL (ref 3.8–10.6)

## 2022-12-22 PROCEDURE — 85730 THROMBOPLASTIN TIME PARTIAL: CPT

## 2022-12-22 PROCEDURE — 87502 INFLUENZA DNA AMP PROBE: CPT

## 2022-12-22 PROCEDURE — 86850 RBC ANTIBODY SCREEN: CPT

## 2022-12-22 PROCEDURE — 86900 BLOOD TYPING SEROLOGIC ABO: CPT

## 2022-12-22 PROCEDURE — 83605 ASSAY OF LACTIC ACID: CPT

## 2022-12-22 PROCEDURE — 86901 BLOOD TYPING SEROLOGIC RH(D): CPT

## 2022-12-22 PROCEDURE — 84484 ASSAY OF TROPONIN QUANT: CPT

## 2022-12-22 PROCEDURE — 96361 HYDRATE IV INFUSION ADD-ON: CPT

## 2022-12-22 PROCEDURE — 93005 ELECTROCARDIOGRAM TRACING: CPT

## 2022-12-22 PROCEDURE — 71046 X-RAY EXAM CHEST 2 VIEWS: CPT

## 2022-12-22 PROCEDURE — 85379 FIBRIN DEGRADATION QUANT: CPT

## 2022-12-22 PROCEDURE — 80053 COMPREHEN METABOLIC PANEL: CPT

## 2022-12-22 PROCEDURE — 87635 SARS-COV-2 COVID-19 AMP PRB: CPT

## 2022-12-22 PROCEDURE — 85025 COMPLETE CBC W/AUTO DIFF WBC: CPT

## 2022-12-22 PROCEDURE — 82272 OCCULT BLD FECES 1-3 TESTS: CPT

## 2022-12-22 PROCEDURE — 36415 COLL VENOUS BLD VENIPUNCTURE: CPT

## 2022-12-22 PROCEDURE — 96365 THER/PROPH/DIAG IV INF INIT: CPT

## 2022-12-22 PROCEDURE — 99291 CRITICAL CARE FIRST HOUR: CPT

## 2022-12-22 PROCEDURE — 85610 PROTHROMBIN TIME: CPT

## 2022-12-22 PROCEDURE — 87040 BLOOD CULTURE FOR BACTERIA: CPT

## 2022-12-22 PROCEDURE — 83880 ASSAY OF NATRIURETIC PEPTIDE: CPT

## 2022-12-22 PROCEDURE — 83735 ASSAY OF MAGNESIUM: CPT

## 2022-12-22 NOTE — ED
SOB HPI





- General


Source: patient, EMS, RN notes reviewed


Mode of arrival: EMS





- History of Present Illness


MD Complaint: shortness of breath





<Akil Georges - Last Filed: 12/22/22 21:35>





<Theo Mena - Last Filed: 12/23/22 01:07>





- General


Chief Complaint: Shortness of Breath


Stated Complaint: sob


Time Seen by Provider: 12/22/22 17:06





- History of Present Illness


Initial Comments: 





67-year-old female presents by EMS with complaints of shortness of breath for 

the past 2-3 days she does claim she has some exertional dyspnea to decreased 

breath sounds per paramedics she was given 2 treatments in route with some 

improvement.  She denies any fevers chills or sweats she does state that she 

normally has exertional dyspnea and desaturation states that lately she's been 

having elevated saturations with exertion no overt chest pain no other 

complaints or modifying factors at this time (Akil Georges)





- Related Data


                                Home Medications











 Medication  Instructions  Recorded  Confirmed


 


Albuterol Sulfate [Ventolin HFA] 2 puff INHALATION RT-Q6H PRN 12/22/20 12/22/22


 


Pantoprazole Sodium [Protonix] 40 mg PO BID 12/22/20 12/22/22


 


busPIRone HCl [Buspar] 10 mg PO TID PRN 12/22/20 12/22/22


 


ALPRAZolam [Xanax] 0.5 mg PO DAILY PRN 12/22/22 12/22/22


 


Aspirin EC [Ecotrin Low Dose] 81 mg PO DAILY 12/22/22 12/22/22


 


Biotin 5 mg PO DAILY 12/22/22 12/22/22


 


Bumetanide [BUMEX] 2 mg PO BID 12/22/22 12/22/22


 


Cholecalciferol [Vitamin D3 (25 25 mcg PO DAILY 12/22/22 12/22/22





Mcg = 1000 Iu)]   


 


Clopidogrel [Plavix] 75 mg PO DAILY 12/22/22 12/22/22


 


Cyanocobalamin [Vitamin B-12] 500 mcg PO DAILY 12/22/22 12/22/22


 


Dapagliflozin Propanediol [Farxiga] 10 mg PO DAILY 12/22/22 12/22/22


 


Dicyclomine [Bentyl] 10 mg PO BID PRN 12/22/22 12/22/22


 


Escitalopram [Lexapro] 10 mg PO DAILY 12/22/22 12/22/22


 


Fluticasone/Umeclidin/Vilanter 1 puff INHALATION RT-DAILY 12/22/22 12/22/22





[Roxi Sungta 100-62.5-25]   


 


Ipratropium-Albuterol Nebulize 3 ml INHALATION RT-Q6H PRN 12/22/22 12/22/22





[Duoneb 0.5 mg-3 mg/3 ml Soln]   


 


Isosorbide Mononitrate ER [Imdur] 30 mg PO DAILY 12/22/22 12/22/22


 


Losartan Potassium [Cozaar] 25 mg PO DAILY 12/22/22 12/22/22


 


Magnesium 250 mg PO DAILY 12/22/22 12/22/22


 


Metoprolol Succinate [Toprol XL] 200 mg PO DAILY 12/22/22 12/22/22


 


Mirtazapine [Remeron] 45 mg PO HS 12/22/22 12/22/22


 


Potassium Chloride [Klor-Con M10] 10 meq PO BID 12/22/22 12/22/22


 


Rosuvastatin [Crestor] 20 mg PO DAILY 12/22/22 12/22/22


 


Spironolactone [Aldactone] 25 mg PO DAILY 12/22/22 12/22/22


 


Sucralfate [Carafate] 1 gm PO ACHS 12/22/22 12/22/22


 


Tiadylt Er 360 Mg 360 mg PO DAILY 12/22/22 12/22/22


 


Zinc Gluconate [Zinc] 50 mg PO DAILY 12/22/22 12/22/22











                                    Allergies











Allergy/AdvReac Type Severity Reaction Status Date / Time


 


amoxicillin [From Augmentin] Allergy  Unknown Verified 12/22/22 18:56


 


clavulanic acid Allergy  Unknown Verified 12/22/22 18:56





[From Augmentin]     


 


codeine Allergy  Unknown Verified 12/22/22 18:56


 


metformin Allergy  Nausea & Verified 12/22/22 18:56





   Vomiting  


 


Tetracyclines Allergy  Unknown Verified 12/22/22 18:56














Review of Systems


ROS Other: All systems not noted in ROS Statement are negative.





<Akil Georges - Last Filed: 12/22/22 21:35>


ROS Other: All systems not noted in ROS Statement are negative.





<Theo Mena - Last Filed: 12/23/22 01:07>


ROS Statement: 


Those systems with pertinent positive or pertinent negative responses have been 

documented in the HPI.








Past Medical History


Past Medical History: Atrial Fibrillation, Heart Failure, COPD


History of Any Multi-Drug Resistant Organisms: None Reported


Past Surgical History: Ablation


Additional Past Surgical History / Comment(s): Brain aneursym 3/3/2020


Past Psychological History: No Psychological Hx Reported


Smoking Status: Former smoker


Past Alcohol Use History: None Reported


Past Drug Use History: None Reported





- Past Family History


  ** Father


Family Medical History: Myocardial Infarction (MI), Rheumatoid Arthritis (RA)





<Akil Georges - Last Filed: 12/22/22 21:35>





General Exam


General appearance: alert, anxious


Head exam: Present: atraumatic, normocephalic, normal inspection


Eye exam: Present: normal appearance, PERRL, EOMI.  Absent: scleral icterus, con

junctival injection, periorbital swelling


ENT exam: Present: normal exam, mucous membranes moist


Neck exam: Present: normal inspection, full ROM, other (No stridor.  Bruits).  

Absent: tenderness, meningismus, lymphadenopathy


Respiratory exam: Present: decreased breath sounds.  Absent: respiratory 

distress, wheezes, rales, rhonchi, stridor


Cardiovascular Exam: Present: normal rhythm, bradycardia, normal heart sounds.  

Absent: systolic murmur, diastolic murmur, rubs, gallop, clicks


GI/Abdominal exam: Present: soft, normal bowel sounds.  Absent: distended, 

tenderness, guarding, rebound, rigid


Extremities exam: Present: normal inspection, full ROM, normal capillary refill.

 Absent: tenderness, pedal edema, joint swelling, calf tenderness


Back exam: Present: normal inspection


Neurological exam: Present: alert, oriented X3, CN II-XII intact


Psychiatric exam: Present: normal affect, normal mood


Skin exam: Present: warm, dry, intact, normal color.  Absent: rash





<José ManuelAkil - Last Filed: 12/22/22 21:35>


General appearance: alert, in no apparent distress, anxious, lethargic, in 

distress


Head exam: Present: atraumatic, normocephalic, normal inspection


Eye exam: Present: normal appearance, PERRL, EOMI.  Absent: scleral icterus, 

conjunctival injection, periorbital swelling


ENT exam: Present: normal exam, mucous membranes moist


Neck exam: Present: normal inspection.  Absent: tenderness, meningismus, 

lymphadenopathy


Respiratory exam: Present: normal lung sounds bilaterally.  Absent: respiratory 

distress, wheezes, rales, rhonchi, stridor


Cardiovascular Exam: Present: regular rate, normal rhythm, normal heart sounds. 

Absent: systolic murmur, diastolic murmur, rubs, gallop, clicks


GI/Abdominal exam: Present: soft, normal bowel sounds.  Absent: distended, 

tenderness, guarding, rebound, rigid


Extremities exam: Present: normal inspection, full ROM, normal capillary refill.

 Absent: tenderness, pedal edema, joint swelling, calf tenderness


Back exam: Present: normal inspection


Neurological exam: Present: alert, oriented X3, CN II-XII intact


Psychiatric exam: Present: normal affect, normal mood


Skin exam: Present: warm, dry, intact, normal color.  Absent: rash





<Theo Mena - Last Filed: 12/23/22 01:07>





- General Exam Comments


Initial Comments: 





This is a well-developed well-nourished awake alert oriented 5 female 

(Akil Georges)





Course





<Akil Georges - Last Filed: 12/22/22 21:35>





<Theo Mena - Last Filed: 12/23/22 01:07>





                                   Vital Signs











  12/22/22 12/22/22 12/22/22





  17:10 18:32 19:30


 


Temperature 98.4 F  


 


Pulse Rate 54 L 53 L 


 


Respiratory 22 20 28 H





Rate   


 


Blood Pressure  87/48 96/64


 


O2 Sat by Pulse 99 99 97





Oximetry   














  12/22/22 12/22/22 12/22/22





  19:38 20:00 21:00


 


Temperature   


 


Pulse Rate 54 L 53 L 56 L


 


Respiratory 26 H 18 13





Rate   


 


Blood Pressure 96/54 90/53 107/75


 


O2 Sat by Pulse 100 100 100





Oximetry   














  12/22/22





  21:03


 


Temperature 


 


Pulse Rate 52 L


 


Respiratory 24





Rate 


 


Blood Pressure 107/75


 


O2 Sat by Pulse 100





Oximetry 














- Reevaluation(s)


Reevaluation #1: 





12/22/22 19:19


Patient states she's recently been constipated she does have a history of GI 

bleed in the past she did require blood transfusion.  He was just discovered 

that her hemoglobin today is 7.5.  She states that she did have some blood in 

her stool recently. (Akil Georges)


Reevaluation #2: 





12/22/22 21:29


I did reevaluate patient on multiple occasions she's had no fevers or chills 

shortness of breath he does have evidence of congestive heart failure she states

her normal blood pressure is between 9900-110 systolic.  Imaging reviewed by me 

showed evidence of right lower lobe infiltrate likely secondary to pulmonary 

mass or congestion with small right pleural effusion.  His was patient does d

emonstrate evidence of anemia Hemoccult is pending at this time (Akil Georges)


Reevaluation #3: 





12/22/22 21:36


Pelvic lactic acid level but be secondary to intravascular find depletion as 

well as chronic renal failure no definitive infectious process identified the 

case will be endorsed to Dr. Mena. (Akil Georges)


Reevaluation #4: 





12/23/22 01:05


Patient did have of bright bloody bowel movement here in the emergency 

department (Theo Mena)





- Consultations


Consultation #1: 





Spoke with Madisyn Hua was agreeable to transfer (Theo Mena)





Medical Decision Making





- Lab Data


Result diagrams: 


                                 12/22/22 17:15





                                 12/22/22 17:15





- EKG Data


-: EKG Interpreted by Me





<Akil Georges - Last Filed: 12/22/22 21:35>





- Lab Data


Result diagrams: 


                                 12/22/22 23:49





                                 12/22/22 17:15





- Radiology Data


Radiology results: report reviewed (Chest x-rays positive for pneumonia), image 

reviewed





<Theo Mena - Last Filed: 12/23/22 01:07>





- Medical Decision Making





67 female to the emergency department for evaluation multiple pending issues, 

both pneumonia GI bleed, patient admitted for IV antibiotics and monitoring of 

hemoglobin.  GI consult, patient is anemic, patient is transferred [facility] is do not have GI coverage (Theo Mena)





- Lab Data





                                   Lab Results











  12/22/22 12/22/22 12/22/22 Range/Units





  17:15 17:15 17:15 


 


WBC  10.7 H    (3.8-10.6)  k/uL


 


RBC  3.85    (3.80-5.40)  m/uL


 


Hgb  7.5 L    (11.4-16.0)  gm/dL


 


Hct  27.4 L    (34.0-46.0)  %


 


MCV  71.4 L    (80.0-100.0)  fL


 


MCH  19.5 L    (25.0-35.0)  pg


 


MCHC  27.4 L    (31.0-37.0)  g/dL


 


RDW  18.9 H    (11.5-15.5)  %


 


Plt Count  190    (150-450)  k/uL


 


MPV  10.0    


 


Neutrophils %  68    %


 


Lymphocytes %  19    %


 


Monocytes %  8    %


 


Eosinophils %  1    %


 


Basophils %  1    %


 


Neutrophils #  7.2    (1.3-7.7)  k/uL


 


Lymphocytes #  2.0    (1.0-4.8)  k/uL


 


Monocytes #  0.8    (0-1.0)  k/uL


 


Eosinophils #  0.1    (0-0.7)  k/uL


 


Basophils #  0.1    (0-0.2)  k/uL


 


Hypochromasia  Marked    


 


Poikilocytosis  Moderate    


 


Anisocytosis  Slight    


 


Microcytosis  Marked    


 


PT   11.6   (9.0-12.0)  sec


 


INR   1.1   (<1.2)  


 


APTT   22.3   (22.0-30.0)  sec


 


D-Dimer   1.48 H   (<0.60)  mg/L FEU


 


Sodium    137  (137-145)  mmol/L


 


Potassium    3.8  (3.5-5.1)  mmol/L


 


Chloride    99  ()  mmol/L


 


Carbon Dioxide    24  (22-30)  mmol/L


 


Anion Gap    14  mmol/L


 


BUN    36 H  (7-17)  mg/dL


 


Creatinine    1.90 H  (0.52-1.04)  mg/dL


 


Est GFR (CKD-EPI)AfAm    31  (>60 ml/min/1.73 sqM)  


 


Est GFR (CKD-EPI)NonAf    27  (>60 ml/min/1.73 sqM)  


 


Glucose    116 H  (74-99)  mg/dL


 


Lactic Ac Sepsis Rflx     


 


Plasma Lactic Acid Jairo     (0.7-2.0)  mmol/L


 


Calcium    8.7  (8.4-10.2)  mg/dL


 


Magnesium    2.3  (1.6-2.3)  mg/dL


 


Total Bilirubin    0.8  (0.2-1.3)  mg/dL


 


AST    29  (14-36)  U/L


 


ALT    15  (4-34)  U/L


 


Alkaline Phosphatase    82  ()  U/L


 


Troponin I     (0.000-0.034)  ng/mL


 


NT-Pro-B Natriuret Pep     pg/mL


 


Total Protein    6.9  (6.3-8.2)  g/dL


 


Albumin    3.8  (3.5-5.0)  g/dL


 


Urine RBC     (0-5)  /hpf


 


Urine WBC     (0-5)  /hpf


 


Ur Squamous Epith Cells     (0-4)  /hpf


 


Urine Bacteria     (None)  /hpf


 


Hyaline Casts     (0-2)  /lpf


 


Urine Mucus     (None)  /hpf


 


Stool Occult Blood     (Negative)  


 


Coronavirus (PCR)     (Not Detectd)  


 


Influenza Type A RNA     (Not Detectd)  


 


Influenza Type B (PCR)     (Not Detectd)  


 


Blood Type     


 


Blood Type Confirm     


 


Blood Type Recheck     


 


Bld Type Recheck Status     


 


Antibody Screen     


 


Spec Expiration Date     














  12/22/22 12/22/22 12/22/22 Range/Units





  17:15 17:15 17:15 


 


WBC     (3.8-10.6)  k/uL


 


RBC     (3.80-5.40)  m/uL


 


Hgb     (11.4-16.0)  gm/dL


 


Hct     (34.0-46.0)  %


 


MCV     (80.0-100.0)  fL


 


MCH     (25.0-35.0)  pg


 


MCHC     (31.0-37.0)  g/dL


 


RDW     (11.5-15.5)  %


 


Plt Count     (150-450)  k/uL


 


MPV     


 


Neutrophils %     %


 


Lymphocytes %     %


 


Monocytes %     %


 


Eosinophils %     %


 


Basophils %     %


 


Neutrophils #     (1.3-7.7)  k/uL


 


Lymphocytes #     (1.0-4.8)  k/uL


 


Monocytes #     (0-1.0)  k/uL


 


Eosinophils #     (0-0.7)  k/uL


 


Basophils #     (0-0.2)  k/uL


 


Hypochromasia     


 


Poikilocytosis     


 


Anisocytosis     


 


Microcytosis     


 


PT     (9.0-12.0)  sec


 


INR     (<1.2)  


 


APTT     (22.0-30.0)  sec


 


D-Dimer     (<0.60)  mg/L FEU


 


Sodium     (137-145)  mmol/L


 


Potassium     (3.5-5.1)  mmol/L


 


Chloride     ()  mmol/L


 


Carbon Dioxide     (22-30)  mmol/L


 


Anion Gap     mmol/L


 


BUN     (7-17)  mg/dL


 


Creatinine     (0.52-1.04)  mg/dL


 


Est GFR (CKD-EPI)AfAm     (>60 ml/min/1.73 sqM)  


 


Est GFR (CKD-EPI)NonAf     (>60 ml/min/1.73 sqM)  


 


Glucose     (74-99)  mg/dL


 


Lactic Ac Sepsis Rflx     


 


Plasma Lactic Acid Jairo  3.7 H*    (0.7-2.0)  mmol/L


 


Calcium     (8.4-10.2)  mg/dL


 


Magnesium     (1.6-2.3)  mg/dL


 


Total Bilirubin     (0.2-1.3)  mg/dL


 


AST     (14-36)  U/L


 


ALT     (4-34)  U/L


 


Alkaline Phosphatase     ()  U/L


 


Troponin I   <0.012   (0.000-0.034)  ng/mL


 


NT-Pro-B Natriuret Pep    1940  pg/mL


 


Total Protein     (6.3-8.2)  g/dL


 


Albumin     (3.5-5.0)  g/dL


 


Urine RBC     (0-5)  /hpf


 


Urine WBC     (0-5)  /hpf


 


Ur Squamous Epith Cells     (0-4)  /hpf


 


Urine Bacteria     (None)  /hpf


 


Hyaline Casts     (0-2)  /lpf


 


Urine Mucus     (None)  /hpf


 


Stool Occult Blood     (Negative)  


 


Coronavirus (PCR)     (Not Detectd)  


 


Influenza Type A RNA     (Not Detectd)  


 


Influenza Type B (PCR)     (Not Detectd)  


 


Blood Type     


 


Blood Type Confirm     


 


Blood Type Recheck     


 


Bld Type Recheck Status     


 


Antibody Screen     


 


Spec Expiration Date     














  12/22/22 12/22/22 12/22/22 Range/Units





  17:15 17:15 18:58 


 


WBC     (3.8-10.6)  k/uL


 


RBC     (3.80-5.40)  m/uL


 


Hgb     (11.4-16.0)  gm/dL


 


Hct     (34.0-46.0)  %


 


MCV     (80.0-100.0)  fL


 


MCH     (25.0-35.0)  pg


 


MCHC     (31.0-37.0)  g/dL


 


RDW     (11.5-15.5)  %


 


Plt Count     (150-450)  k/uL


 


MPV     


 


Neutrophils %     %


 


Lymphocytes %     %


 


Monocytes %     %


 


Eosinophils %     %


 


Basophils %     %


 


Neutrophils #     (1.3-7.7)  k/uL


 


Lymphocytes #     (1.0-4.8)  k/uL


 


Monocytes #     (0-1.0)  k/uL


 


Eosinophils #     (0-0.7)  k/uL


 


Basophils #     (0-0.2)  k/uL


 


Hypochromasia     


 


Poikilocytosis     


 


Anisocytosis     


 


Microcytosis     


 


PT     (9.0-12.0)  sec


 


INR     (<1.2)  


 


APTT     (22.0-30.0)  sec


 


D-Dimer     (<0.60)  mg/L FEU


 


Sodium     (137-145)  mmol/L


 


Potassium     (3.5-5.1)  mmol/L


 


Chloride     ()  mmol/L


 


Carbon Dioxide     (22-30)  mmol/L


 


Anion Gap     mmol/L


 


BUN     (7-17)  mg/dL


 


Creatinine     (0.52-1.04)  mg/dL


 


Est GFR (CKD-EPI)AfAm     (>60 ml/min/1.73 sqM)  


 


Est GFR (CKD-EPI)NonAf     (>60 ml/min/1.73 sqM)  


 


Glucose     (74-99)  mg/dL


 


Lactic Ac Sepsis Rflx    Y  


 


Plasma Lactic Acid Jairo     (0.7-2.0)  mmol/L


 


Calcium     (8.4-10.2)  mg/dL


 


Magnesium     (1.6-2.3)  mg/dL


 


Total Bilirubin     (0.2-1.3)  mg/dL


 


AST     (14-36)  U/L


 


ALT     (4-34)  U/L


 


Alkaline Phosphatase     ()  U/L


 


Troponin I     (0.000-0.034)  ng/mL


 


NT-Pro-B Natriuret Pep     pg/mL


 


Total Protein     (6.3-8.2)  g/dL


 


Albumin     (3.5-5.0)  g/dL


 


Urine RBC     (0-5)  /hpf


 


Urine WBC     (0-5)  /hpf


 


Ur Squamous Epith Cells     (0-4)  /hpf


 


Urine Bacteria     (None)  /hpf


 


Hyaline Casts     (0-2)  /lpf


 


Urine Mucus     (None)  /hpf


 


Stool Occult Blood     (Negative)  


 


Coronavirus (PCR)   Not Detected   (Not Detectd)  


 


Influenza Type A RNA  Not Detected    (Not Detectd)  


 


Influenza Type B (PCR)  Not Detected    (Not Detectd)  


 


Blood Type     


 


Blood Type Confirm     


 


Blood Type Recheck     


 


Bld Type Recheck Status     


 


Antibody Screen     


 


Spec Expiration Date     














  12/22/22 12/22/22 12/22/22 Range/Units





  19:40 23:00 23:00 


 


WBC     (3.8-10.6)  k/uL


 


RBC     (3.80-5.40)  m/uL


 


Hgb     (11.4-16.0)  gm/dL


 


Hct     (34.0-46.0)  %


 


MCV     (80.0-100.0)  fL


 


MCH     (25.0-35.0)  pg


 


MCHC     (31.0-37.0)  g/dL


 


RDW     (11.5-15.5)  %


 


Plt Count     (150-450)  k/uL


 


MPV     


 


Neutrophils %     %


 


Lymphocytes %     %


 


Monocytes %     %


 


Eosinophils %     %


 


Basophils %     %


 


Neutrophils #     (1.3-7.7)  k/uL


 


Lymphocytes #     (1.0-4.8)  k/uL


 


Monocytes #     (0-1.0)  k/uL


 


Eosinophils #     (0-0.7)  k/uL


 


Basophils #     (0-0.2)  k/uL


 


Hypochromasia     


 


Poikilocytosis     


 


Anisocytosis     


 


Microcytosis     


 


PT     (9.0-12.0)  sec


 


INR     (<1.2)  


 


APTT     (22.0-30.0)  sec


 


D-Dimer     (<0.60)  mg/L FEU


 


Sodium     (137-145)  mmol/L


 


Potassium     (3.5-5.1)  mmol/L


 


Chloride     ()  mmol/L


 


Carbon Dioxide     (22-30)  mmol/L


 


Anion Gap     mmol/L


 


BUN     (7-17)  mg/dL


 


Creatinine     (0.52-1.04)  mg/dL


 


Est GFR (CKD-EPI)AfAm     (>60 ml/min/1.73 sqM)  


 


Est GFR (CKD-EPI)NonAf     (>60 ml/min/1.73 sqM)  


 


Glucose     (74-99)  mg/dL


 


Lactic Ac Sepsis Rflx     


 


Plasma Lactic Acid Jairo    3.4 H*  (0.7-2.0)  mmol/L


 


Calcium     (8.4-10.2)  mg/dL


 


Magnesium     (1.6-2.3)  mg/dL


 


Total Bilirubin     (0.2-1.3)  mg/dL


 


AST     (14-36)  U/L


 


ALT     (4-34)  U/L


 


Alkaline Phosphatase     ()  U/L


 


Troponin I     (0.000-0.034)  ng/mL


 


NT-Pro-B Natriuret Pep     pg/mL


 


Total Protein     (6.3-8.2)  g/dL


 


Albumin     (3.5-5.0)  g/dL


 


Urine RBC     (0-5)  /hpf


 


Urine WBC     (0-5)  /hpf


 


Ur Squamous Epith Cells     (0-4)  /hpf


 


Urine Bacteria     (None)  /hpf


 


Hyaline Casts     (0-2)  /lpf


 


Urine Mucus     (None)  /hpf


 


Stool Occult Blood  Negative    (Negative)  


 


Coronavirus (PCR)     (Not Detectd)  


 


Influenza Type A RNA     (Not Detectd)  


 


Influenza Type B (PCR)     (Not Detectd)  


 


Blood Type   A Positive   


 


Blood Type Confirm     


 


Blood Type Recheck   No Previous Record   


 


Bld Type Recheck Status   CABO Indicated   


 


Antibody Screen   NEGATIVE   


 


Spec Expiration Date   12/25/2022 - 2300 12/22/22 12/22/22 12/22/22 Range/Units





  23:19 23:42 23:49 


 


WBC    12.0 H  (3.8-10.6)  k/uL


 


RBC    3.89  (3.80-5.40)  m/uL


 


Hgb    7.8 L  (11.4-16.0)  gm/dL


 


Hct    28.7 L  (34.0-46.0)  %


 


MCV    73.8 L  (80.0-100.0)  fL


 


MCH    20.1 L  (25.0-35.0)  pg


 


MCHC    27.2 L  (31.0-37.0)  g/dL


 


RDW    18.7 H  (11.5-15.5)  %


 


Plt Count    175  (150-450)  k/uL


 


MPV    10.8  


 


Neutrophils %     %


 


Lymphocytes %     %


 


Monocytes %     %


 


Eosinophils %     %


 


Basophils %     %


 


Neutrophils #     (1.3-7.7)  k/uL


 


Lymphocytes #     (1.0-4.8)  k/uL


 


Monocytes #     (0-1.0)  k/uL


 


Eosinophils #     (0-0.7)  k/uL


 


Basophils #     (0-0.2)  k/uL


 


Hypochromasia    Marked  


 


Poikilocytosis    Moderate  


 


Anisocytosis    Slight  


 


Microcytosis    Moderate  


 


PT     (9.0-12.0)  sec


 


INR     (<1.2)  


 


APTT     (22.0-30.0)  sec


 


D-Dimer     (<0.60)  mg/L FEU


 


Sodium     (137-145)  mmol/L


 


Potassium     (3.5-5.1)  mmol/L


 


Chloride     ()  mmol/L


 


Carbon Dioxide     (22-30)  mmol/L


 


Anion Gap     mmol/L


 


BUN     (7-17)  mg/dL


 


Creatinine     (0.52-1.04)  mg/dL


 


Est GFR (CKD-EPI)AfAm     (>60 ml/min/1.73 sqM)  


 


Est GFR (CKD-EPI)NonAf     (>60 ml/min/1.73 sqM)  


 


Glucose     (74-99)  mg/dL


 


Lactic Ac Sepsis Rflx     


 


Plasma Lactic Acid Jairo     (0.7-2.0)  mmol/L


 


Calcium     (8.4-10.2)  mg/dL


 


Magnesium     (1.6-2.3)  mg/dL


 


Total Bilirubin     (0.2-1.3)  mg/dL


 


AST     (14-36)  U/L


 


ALT     (4-34)  U/L


 


Alkaline Phosphatase     ()  U/L


 


Troponin I     (0.000-0.034)  ng/mL


 


NT-Pro-B Natriuret Pep     pg/mL


 


Total Protein     (6.3-8.2)  g/dL


 


Albumin     (3.5-5.0)  g/dL


 


Urine RBC   2   (0-5)  /hpf


 


Urine WBC   3   (0-5)  /hpf


 


Ur Squamous Epith Cells   4   (0-4)  /hpf


 


Urine Bacteria   Moderate H   (None)  /hpf


 


Hyaline Casts   3 H   (0-2)  /lpf


 


Urine Mucus   Rare H   (None)  /hpf


 


Stool Occult Blood     (Negative)  


 


Coronavirus (PCR)     (Not Detectd)  


 


Influenza Type A RNA     (Not Detectd)  


 


Influenza Type B (PCR)     (Not Detectd)  


 


Blood Type     


 


Blood Type Confirm  A Positive    


 


Blood Type Recheck     


 


Bld Type Recheck Status     


 


Antibody Screen     


 


Spec Expiration Date     














- EKG Data


EKG Comments: 





EKG interpreted by me shows a bradycardia 51 QRS duration 12 QT since QTC 

455/4:30 30 QRS voltage incomplete right bundle-branch block evidence of left 

anterior fascicular block no acute ST-T wave changes appreciated (Akil Georges)





Critical Care Time


Critical Care Time: Yes


Total Critical Care Time: 31





<Theo Mena - Last Filed: 12/23/22 01:07>





Disposition





<Akil Georges - Last Filed: 12/22/22 21:35>


Is patient prescribed a controlled substance at d/c from ED?: No


Time of Disposition: 01:10





- Out of Hospital Transfer - Req. Specs


Out of Hospital Transfer - Requested Specifics: Other Emergency Center ([facility])





<Theo Mena - Last Filed: 12/23/22 01:07>


Clinical Impression: 


 History of COPD, Hypoxia, Community acquired pneumonia, Acute exacerbation of 

chronic obstructive pulmonary disease, GI bleed, BRBPR (bright red blood per 

rectum), Anemia





Disposition: OTHER INSTITUTION NOT DEFINED


Condition: Serious


Referrals: 


Nonstaff,Physician [Primary Care Provider] - 1-2 days

## 2022-12-22 NOTE — XR
EXAMINATION TYPE: XR chest 2V

 

DATE OF EXAM: 12/22/2022

 

COMPARISON: 11/30/2021

 

INDICATION: Difficulty breathing

 

TECHNIQUE:  Frontal and lateral views of the chest are obtained.

 

FINDINGS:  

The heart size is normal.  

The pulmonary vasculature is normal.

Right lower lobe infiltrate is present. Minimal right pleural effusion may be present..

 

IMPRESSION:  

1. Small right pleural effusion.

2. Right basilar infiltrate. Correlate for atelectasis

## 2022-12-23 VITALS — RESPIRATION RATE: 26 BRPM | DIASTOLIC BLOOD PRESSURE: 71 MMHG | HEART RATE: 60 BPM | SYSTOLIC BLOOD PRESSURE: 113 MMHG

## 2022-12-23 LAB
CELLS COUNTED: 200
EOSINOPHIL # BLD MANUAL: 0.12 K/UL (ref 0–0.7)
ERYTHROCYTE [DISTWIDTH] IN BLOOD BY AUTOMATED COUNT: 3.89 M/UL (ref 3.8–5.4)
ERYTHROCYTE [DISTWIDTH] IN BLOOD: 18.7 % (ref 11.5–15.5)
HCT VFR BLD AUTO: 28.7 % (ref 34–46)
HGB BLD-MCNC: 7.8 GM/DL (ref 11.4–16)
HYALINE CASTS UR QL AUTO: 3 /LPF (ref 0–2)
LYMPHOCYTES # BLD MANUAL: 2.22 K/UL (ref 1–4.8)
MCH RBC QN AUTO: 20.1 PG (ref 25–35)
MCHC RBC AUTO-ENTMCNC: 27.2 G/DL (ref 31–37)
MCV RBC AUTO: 73.8 FL (ref 80–100)
MONOCYTES # BLD MANUAL: 0.82 K/UL (ref 0–1)
NEUTROPHILS NFR BLD MANUAL: 71 %
NEUTS SEG # BLD MANUAL: 8.7 K/UL (ref 1.3–7.7)
PLATELET # BLD AUTO: 175 K/UL (ref 150–450)
RBC UR QL: 2 /HPF (ref 0–5)
SQUAMOUS UR QL AUTO: 4 /HPF (ref 0–4)
WBC # BLD AUTO: 11.7 K/UL (ref 3.8–10.6)
WBC # UR AUTO: 3 /HPF (ref 0–5)

## 2022-12-29 ENCOUNTER — HOSPITAL ENCOUNTER (INPATIENT)
Dept: HOSPITAL 47 - EC | Age: 67
LOS: 4 days | Discharge: HOME | DRG: 193 | End: 2023-01-02
Attending: INTERNAL MEDICINE | Admitting: INTERNAL MEDICINE
Payer: MEDICARE

## 2022-12-29 DIAGNOSIS — K64.8: ICD-10-CM

## 2022-12-29 DIAGNOSIS — J18.9: Primary | ICD-10-CM

## 2022-12-29 DIAGNOSIS — F41.9: ICD-10-CM

## 2022-12-29 DIAGNOSIS — E66.9: ICD-10-CM

## 2022-12-29 DIAGNOSIS — I50.33: ICD-10-CM

## 2022-12-29 DIAGNOSIS — Z79.01: ICD-10-CM

## 2022-12-29 DIAGNOSIS — Z79.02: ICD-10-CM

## 2022-12-29 DIAGNOSIS — Z95.818: ICD-10-CM

## 2022-12-29 DIAGNOSIS — E78.5: ICD-10-CM

## 2022-12-29 DIAGNOSIS — Z88.5: ICD-10-CM

## 2022-12-29 DIAGNOSIS — Z87.891: ICD-10-CM

## 2022-12-29 DIAGNOSIS — J44.1: ICD-10-CM

## 2022-12-29 DIAGNOSIS — F10.20: ICD-10-CM

## 2022-12-29 DIAGNOSIS — Z20.822: ICD-10-CM

## 2022-12-29 DIAGNOSIS — K64.3: ICD-10-CM

## 2022-12-29 DIAGNOSIS — J98.11: ICD-10-CM

## 2022-12-29 DIAGNOSIS — I25.10: ICD-10-CM

## 2022-12-29 DIAGNOSIS — Z53.20: ICD-10-CM

## 2022-12-29 DIAGNOSIS — K29.70: ICD-10-CM

## 2022-12-29 DIAGNOSIS — Z88.8: ICD-10-CM

## 2022-12-29 DIAGNOSIS — R64: ICD-10-CM

## 2022-12-29 DIAGNOSIS — Z79.84: ICD-10-CM

## 2022-12-29 DIAGNOSIS — D62: ICD-10-CM

## 2022-12-29 DIAGNOSIS — J44.0: ICD-10-CM

## 2022-12-29 DIAGNOSIS — I48.21: ICD-10-CM

## 2022-12-29 DIAGNOSIS — Z88.1: ICD-10-CM

## 2022-12-29 DIAGNOSIS — I47.20: ICD-10-CM

## 2022-12-29 DIAGNOSIS — Z79.899: ICD-10-CM

## 2022-12-29 DIAGNOSIS — Z79.82: ICD-10-CM

## 2022-12-29 DIAGNOSIS — D50.9: ICD-10-CM

## 2022-12-29 DIAGNOSIS — Z87.01: ICD-10-CM

## 2022-12-29 DIAGNOSIS — K20.90: ICD-10-CM

## 2022-12-29 DIAGNOSIS — K22.89: ICD-10-CM

## 2022-12-29 DIAGNOSIS — I11.0: ICD-10-CM

## 2022-12-29 DIAGNOSIS — J96.21: ICD-10-CM
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
## 2022-12-29 DIAGNOSIS — G47.00: ICD-10-CM

## 2022-12-29 DIAGNOSIS — I10: ICD-10-CM

## 2022-12-29 DIAGNOSIS — K44.9: ICD-10-CM

## 2022-12-29 DIAGNOSIS — K57.31: ICD-10-CM

## 2022-12-29 LAB
ALBUMIN SERPL-MCNC: 3.5 G/DL (ref 3.5–5)
ALP SERPL-CCNC: 109 U/L (ref 38–126)
ALT SERPL-CCNC: 50 U/L (ref 4–34)
ANION GAP SERPL CALC-SCNC: 8 MMOL/L
APTT BLD: 25.2 SEC (ref 22–30)
AST SERPL-CCNC: 31 U/L (ref 14–36)
BASOPHILS # BLD AUTO: 0 K/UL (ref 0–0.2)
BASOPHILS # BLD AUTO: 0 K/UL (ref 0–0.2)
BASOPHILS # BLD AUTO: 0.09 X 10*3/UL (ref 0–0.1)
BASOPHILS # BLD AUTO: 0.1 K/UL (ref 0–0.2)
BASOPHILS NFR BLD AUTO: 1 %
BUN SERPL-SCNC: 21 MG/DL (ref 7–17)
CALCIUM SPEC-MCNC: 8.3 MG/DL (ref 8.4–10.2)
CHLORIDE SERPL-SCNC: 98 MMOL/L (ref 98–107)
CO2 SERPL-SCNC: 30 MMOL/L (ref 22–30)
EOSINOPHIL # BLD AUTO: 0 K/UL (ref 0–0.7)
EOSINOPHIL # BLD AUTO: 0.1 K/UL (ref 0–0.7)
EOSINOPHIL # BLD AUTO: 0.1 K/UL (ref 0–0.7)
EOSINOPHIL # BLD AUTO: 0.15 X 10*3/UL (ref 0.04–0.35)
EOSINOPHIL NFR BLD AUTO: 1 %
EOSINOPHIL NFR BLD AUTO: 1.7 %
EOSINOPHIL NFR BLD AUTO: 2 %
EOSINOPHIL NFR BLD AUTO: 2 %
ERYTHROCYTE [DISTWIDTH] IN BLOOD BY AUTOMATED COUNT: 3.89 M/UL (ref 3.8–5.4)
ERYTHROCYTE [DISTWIDTH] IN BLOOD BY AUTOMATED COUNT: 3.9 X 10*6/UL (ref 4.1–5.2)
ERYTHROCYTE [DISTWIDTH] IN BLOOD BY AUTOMATED COUNT: 3.96 M/UL (ref 3.8–5.4)
ERYTHROCYTE [DISTWIDTH] IN BLOOD BY AUTOMATED COUNT: 4.04 M/UL (ref 3.8–5.4)
ERYTHROCYTE [DISTWIDTH] IN BLOOD: 20.3 % (ref 11.5–15.5)
ERYTHROCYTE [DISTWIDTH] IN BLOOD: 20.8 % (ref 11.5–15.5)
ERYTHROCYTE [DISTWIDTH] IN BLOOD: 20.8 % (ref 11.5–15.5)
ERYTHROCYTE [DISTWIDTH] IN BLOOD: 23.6 % (ref 11.5–14.5)
FERRITIN SERPL-MCNC: 19.4 NG/ML (ref 10–291)
GLUCOSE SERPL-MCNC: 110 MG/DL (ref 74–99)
GLUCOSE UR QL: (no result)
HCT VFR BLD AUTO: 29 % (ref 34–46)
HCT VFR BLD AUTO: 29 % (ref 37.2–46.3)
HCT VFR BLD AUTO: 29.2 % (ref 34–46)
HCT VFR BLD AUTO: 29.9 % (ref 34–46)
HGB BLD-MCNC: 8 G/DL (ref 12–15)
HGB BLD-MCNC: 8.5 GM/DL (ref 11.4–16)
HGB BLD-MCNC: 8.5 GM/DL (ref 11.4–16)
HGB BLD-MCNC: 8.6 GM/DL (ref 11.4–16)
HYALINE CASTS UR QL AUTO: 124 /LPF (ref 0–2)
IMM GRANULOCYTES BLD QL AUTO: 0.6 %
INR PPP: 1.2 (ref ?–1.2)
IRON SERPL-MCNC: 21 UG/DL (ref 50–170)
LYMPHOCYTES # SPEC AUTO: 0.4 K/UL (ref 1–4.8)
LYMPHOCYTES # SPEC AUTO: 1 K/UL (ref 1–4.8)
LYMPHOCYTES # SPEC AUTO: 1.4 K/UL (ref 1–4.8)
LYMPHOCYTES # SPEC AUTO: 1.74 X 10*3/UL (ref 0.9–5)
LYMPHOCYTES NFR SPEC AUTO: 14 %
LYMPHOCYTES NFR SPEC AUTO: 17 %
LYMPHOCYTES NFR SPEC AUTO: 20 %
LYMPHOCYTES NFR SPEC AUTO: 7 %
MAGNESIUM SPEC-SCNC: 2 MG/DL (ref 1.6–2.3)
MCH RBC QN AUTO: 20.5 PG (ref 27–32)
MCH RBC QN AUTO: 21.2 PG (ref 25–35)
MCH RBC QN AUTO: 21.3 PG (ref 25–35)
MCH RBC QN AUTO: 21.9 PG (ref 25–35)
MCHC RBC AUTO-ENTMCNC: 27.6 G/DL (ref 32–37)
MCHC RBC AUTO-ENTMCNC: 28.6 G/DL (ref 31–37)
MCHC RBC AUTO-ENTMCNC: 29.1 G/DL (ref 31–37)
MCHC RBC AUTO-ENTMCNC: 29.1 G/DL (ref 31–37)
MCV RBC AUTO: 73.3 FL (ref 80–100)
MCV RBC AUTO: 74.1 FL (ref 80–100)
MCV RBC AUTO: 74.4 FL (ref 80–97)
MCV RBC AUTO: 75.1 FL (ref 80–100)
MONOCYTES # BLD AUTO: 0.1 K/UL (ref 0–1)
MONOCYTES # BLD AUTO: 0.4 K/UL (ref 0–1)
MONOCYTES # BLD AUTO: 0.7 K/UL (ref 0–1)
MONOCYTES # BLD AUTO: 0.92 X 10*3/UL (ref 0.2–1)
MONOCYTES NFR BLD AUTO: 10.6 %
MONOCYTES NFR BLD AUTO: 3 %
MONOCYTES NFR BLD AUTO: 5 %
MONOCYTES NFR BLD AUTO: 8 %
NEUTROPHILS # BLD AUTO: 4.5 K/UL (ref 1.3–7.7)
NEUTROPHILS # BLD AUTO: 5.2 K/UL (ref 1.3–7.7)
NEUTROPHILS # BLD AUTO: 5.7 K/UL (ref 1.3–7.7)
NEUTROPHILS # BLD AUTO: 5.76 X 10*3/UL (ref 1.8–7.7)
NEUTROPHILS NFR BLD AUTO: 66.1 %
NEUTROPHILS NFR BLD AUTO: 70 %
NEUTROPHILS NFR BLD AUTO: 75 %
NEUTROPHILS NFR BLD AUTO: 89 %
NRBC BLD AUTO-RTO: 0.5 /100 WBCS (ref 0–0)
PH UR: 5 [PH] (ref 5–8)
PLATELET # BLD AUTO: 159 K/UL (ref 150–450)
PLATELET # BLD AUTO: 165 K/UL (ref 150–450)
PLATELET # BLD AUTO: 190 X 10*3/UL (ref 140–440)
PLATELET # BLD AUTO: 195 K/UL (ref 150–450)
POTASSIUM SERPL-SCNC: 4.6 MMOL/L (ref 3.5–5.1)
PROT SERPL-MCNC: 6.4 G/DL (ref 6.3–8.2)
PROT UR QL: (no result)
PT BLD: 12.2 SEC (ref 9–12)
RBC UR QL: 115 /HPF (ref 0–5)
SODIUM SERPL-SCNC: 136 MMOL/L (ref 137–145)
SP GR UR: 1.02 (ref 1–1.03)
SQUAMOUS UR QL AUTO: 53 /HPF (ref 0–4)
TIBC SERPL-MCNC: 494 UG/DL (ref 228–460)
UROBILINOGEN UR QL STRIP: 4 MG/DL (ref ?–2)
WBC # BLD AUTO: 5.1 K/UL (ref 3.8–10.6)
WBC # BLD AUTO: 6.9 K/UL (ref 3.8–10.6)
WBC # BLD AUTO: 8.1 K/UL (ref 3.8–10.6)
WBC # BLD AUTO: 8.71 X 10*3/UL (ref 4.5–10)
WBC # UR AUTO: 56 /HPF (ref 0–5)

## 2022-12-29 PROCEDURE — 93005 ELECTROCARDIOGRAM TRACING: CPT

## 2022-12-29 PROCEDURE — 43235 EGD DIAGNOSTIC BRUSH WASH: CPT

## 2022-12-29 PROCEDURE — 83735 ASSAY OF MAGNESIUM: CPT

## 2022-12-29 PROCEDURE — 99285 EMERGENCY DEPT VISIT HI MDM: CPT

## 2022-12-29 PROCEDURE — 94640 AIRWAY INHALATION TREATMENT: CPT

## 2022-12-29 PROCEDURE — 87070 CULTURE OTHR SPECIMN AEROBIC: CPT

## 2022-12-29 PROCEDURE — 83540 ASSAY OF IRON: CPT

## 2022-12-29 PROCEDURE — 85610 PROTHROMBIN TIME: CPT

## 2022-12-29 PROCEDURE — 80053 COMPREHEN METABOLIC PANEL: CPT

## 2022-12-29 PROCEDURE — 85730 THROMBOPLASTIN TIME PARTIAL: CPT

## 2022-12-29 PROCEDURE — 83550 IRON BINDING TEST: CPT

## 2022-12-29 PROCEDURE — 71046 X-RAY EXAM CHEST 2 VIEWS: CPT

## 2022-12-29 PROCEDURE — 94760 N-INVAS EAR/PLS OXIMETRY 1: CPT

## 2022-12-29 PROCEDURE — 80048 BASIC METABOLIC PNL TOTAL CA: CPT

## 2022-12-29 PROCEDURE — 82728 ASSAY OF FERRITIN: CPT

## 2022-12-29 PROCEDURE — 84100 ASSAY OF PHOSPHORUS: CPT

## 2022-12-29 PROCEDURE — 84145 PROCALCITONIN (PCT): CPT

## 2022-12-29 PROCEDURE — 83880 ASSAY OF NATRIURETIC PEPTIDE: CPT

## 2022-12-29 PROCEDURE — 36415 COLL VENOUS BLD VENIPUNCTURE: CPT

## 2022-12-29 PROCEDURE — 85379 FIBRIN DEGRADATION QUANT: CPT

## 2022-12-29 PROCEDURE — 3E0F7SF INTRODUCTION OF OTHER GAS INTO RESPIRATORY TRACT, VIA NATURAL OR ARTIFICIAL OPENING: ICD-10-PCS

## 2022-12-29 PROCEDURE — 85027 COMPLETE CBC AUTOMATED: CPT

## 2022-12-29 PROCEDURE — 87636 SARSCOV2 & INF A&B AMP PRB: CPT

## 2022-12-29 PROCEDURE — 87086 URINE CULTURE/COLONY COUNT: CPT

## 2022-12-29 PROCEDURE — 96375 TX/PRO/DX INJ NEW DRUG ADDON: CPT

## 2022-12-29 PROCEDURE — 83036 HEMOGLOBIN GLYCOSYLATED A1C: CPT

## 2022-12-29 PROCEDURE — 85025 COMPLETE CBC W/AUTO DIFF WBC: CPT

## 2022-12-29 PROCEDURE — 96367 TX/PROPH/DG ADDL SEQ IV INF: CPT

## 2022-12-29 PROCEDURE — 96366 THER/PROPH/DIAG IV INF ADDON: CPT

## 2022-12-29 PROCEDURE — 81001 URINALYSIS AUTO W/SCOPE: CPT

## 2022-12-29 PROCEDURE — 71275 CT ANGIOGRAPHY CHEST: CPT

## 2022-12-29 PROCEDURE — 45378 DIAGNOSTIC COLONOSCOPY: CPT

## 2022-12-29 PROCEDURE — 96365 THER/PROPH/DIAG IV INF INIT: CPT

## 2022-12-29 PROCEDURE — 84484 ASSAY OF TROPONIN QUANT: CPT

## 2022-12-29 PROCEDURE — 87205 SMEAR GRAM STAIN: CPT

## 2022-12-29 RX ADMIN — ISOSORBIDE MONONITRATE SCH MG: 30 TABLET, EXTENDED RELEASE ORAL at 09:25

## 2022-12-29 RX ADMIN — CEFEPIME HYDROCHLORIDE SCH MLS/HR: 1 INJECTION, POWDER, FOR SOLUTION INTRAMUSCULAR; INTRAVENOUS at 21:31

## 2022-12-29 RX ADMIN — HEPARIN SODIUM SCH: 5000 INJECTION INTRAVENOUS; SUBCUTANEOUS at 12:22

## 2022-12-29 RX ADMIN — FORMOTEROL FUMARATE DIHYDRATE SCH MCG: 20 SOLUTION RESPIRATORY (INHALATION) at 20:24

## 2022-12-29 RX ADMIN — ATORVASTATIN CALCIUM SCH MG: 40 TABLET, FILM COATED ORAL at 09:25

## 2022-12-29 RX ADMIN — HEPARIN SODIUM SCH UNIT: 5000 INJECTION INTRAVENOUS; SUBCUTANEOUS at 16:09

## 2022-12-29 RX ADMIN — IPRATROPIUM BROMIDE AND ALBUTEROL SULFATE SCH ML: .5; 3 SOLUTION RESPIRATORY (INHALATION) at 15:55

## 2022-12-29 RX ADMIN — MIRTAZAPINE SCH MG: 45 TABLET, FILM COATED ORAL at 22:34

## 2022-12-29 RX ADMIN — IPRATROPIUM BROMIDE AND ALBUTEROL SULFATE SCH ML: .5; 3 SOLUTION RESPIRATORY (INHALATION) at 07:46

## 2022-12-29 RX ADMIN — METHYLPREDNISOLONE SODIUM SUCCINATE SCH MG: 125 INJECTION, POWDER, FOR SOLUTION INTRAMUSCULAR; INTRAVENOUS at 12:19

## 2022-12-29 RX ADMIN — IPRATROPIUM BROMIDE AND ALBUTEROL SULFATE SCH ML: .5; 3 SOLUTION RESPIRATORY (INHALATION) at 20:23

## 2022-12-29 RX ADMIN — LOSARTAN POTASSIUM SCH MG: 25 TABLET, FILM COATED ORAL at 09:26

## 2022-12-29 RX ADMIN — HEPARIN SODIUM SCH UNIT: 5000 INJECTION INTRAVENOUS; SUBCUTANEOUS at 12:19

## 2022-12-29 RX ADMIN — FUROSEMIDE SCH MG: 10 INJECTION, SOLUTION INTRAMUSCULAR; INTRAVENOUS at 21:31

## 2022-12-29 RX ADMIN — ASPIRIN 81 MG CHEWABLE TABLET SCH MG: 81 TABLET CHEWABLE at 09:25

## 2022-12-29 RX ADMIN — PANTOPRAZOLE SODIUM SCH MG: 40 INJECTION, POWDER, FOR SOLUTION INTRAVENOUS at 21:31

## 2022-12-29 RX ADMIN — METHYLPREDNISOLONE SODIUM SUCCINATE SCH MG: 125 INJECTION, POWDER, FOR SOLUTION INTRAMUSCULAR; INTRAVENOUS at 17:49

## 2022-12-29 RX ADMIN — FUROSEMIDE SCH MG: 10 INJECTION, SOLUTION INTRAMUSCULAR; INTRAVENOUS at 09:26

## 2022-12-29 RX ADMIN — ESCITALOPRAM OXALATE SCH MG: 10 TABLET, FILM COATED ORAL at 09:26

## 2022-12-29 RX ADMIN — BUDESONIDE SCH MG: 1 SUSPENSION RESPIRATORY (INHALATION) at 20:24

## 2022-12-29 RX ADMIN — IPRATROPIUM BROMIDE AND ALBUTEROL SULFATE SCH ML: .5; 3 SOLUTION RESPIRATORY (INHALATION) at 12:18

## 2022-12-29 RX ADMIN — METOPROLOL SUCCINATE SCH MG: 100 TABLET, EXTENDED RELEASE ORAL at 09:26

## 2022-12-29 RX ADMIN — SPIRONOLACTONE SCH MG: 25 TABLET, FILM COATED ORAL at 09:26

## 2022-12-29 NOTE — ED
General Adult HPI





- General


Chief complaint: Shortness of Breath


Stated complaint: Difficulty Breathing


Time Seen by Provider: 12/29/22 00:59


Source: patient, EMS


Mode of arrival: EMS


Limitations: no limitations





- History of Present Illness


Initial comments: 


This is a 67-year-old female with a past medical history including COPD, CHF 

with recent admission to the hospital and recent discharge presents emergency 

department via EMS for increasing shortness of breath.  The patient stated that 

she is on 4 L of nasal cannula oxygen and stated that she had increasing terms 

of breath throughout the day worsening tonight.  The patient stated that she 

felt as if she could not catch her breath.  The patient did state that she was 

improving on discharge from the hospital last week but stated that she has not 

improved over the last several days.  The patient denied any new fevers or 

chills as well as any new coughing.  The patient denied any other acute pain or 

distress at this time but was having tachypnea and moderate respiratory 

distress.








- Related Data


                                Home Medications











 Medication  Instructions  Recorded  Confirmed


 


Albuterol Sulfate [Ventolin HFA] 2 puff INHALATION RT-Q6H PRN 12/22/20 12/22/22


 


Pantoprazole Sodium [Protonix] 40 mg PO BID 12/22/20 12/22/22


 


busPIRone HCl [Buspar] 10 mg PO TID PRN 12/22/20 12/22/22


 


ALPRAZolam [Xanax] 0.5 mg PO DAILY PRN 12/22/22 12/22/22


 


Aspirin EC [Ecotrin Low Dose] 81 mg PO DAILY 12/22/22 12/22/22


 


Biotin 5 mg PO DAILY 12/22/22 12/22/22


 


Bumetanide [BUMEX] 2 mg PO BID 12/22/22 12/22/22


 


Cholecalciferol [Vitamin D3 (25 25 mcg PO DAILY 12/22/22 12/22/22





Mcg = 1000 Iu)]   


 


Clopidogrel [Plavix] 75 mg PO DAILY 12/22/22 12/22/22


 


Cyanocobalamin [Vitamin B-12] 500 mcg PO DAILY 12/22/22 12/22/22


 


Dapagliflozin Propanediol [Farxiga] 10 mg PO DAILY 12/22/22 12/22/22


 


Dicyclomine [Bentyl] 10 mg PO BID PRN 12/22/22 12/22/22


 


Escitalopram [Lexapro] 10 mg PO DAILY 12/22/22 12/22/22


 


Fluticasone/Umeclidin/Vilanter 1 puff INHALATION RT-DAILY 12/22/22 12/22/22





[Robertleramon Ellipta 100-62.5-25]   


 


Ipratropium-Albuterol Nebulize 3 ml INHALATION RT-Q6H PRN 12/22/22 12/22/22





[Duoneb 0.5 mg-3 mg/3 ml Soln]   


 


Isosorbide Mononitrate ER [Imdur] 30 mg PO DAILY 12/22/22 12/22/22


 


Losartan Potassium [Cozaar] 25 mg PO DAILY 12/22/22 12/22/22


 


Magnesium 250 mg PO DAILY 12/22/22 12/22/22


 


Metoprolol Succinate [Toprol XL] 200 mg PO DAILY 12/22/22 12/22/22


 


Mirtazapine [Remeron] 45 mg PO HS 12/22/22 12/22/22


 


Potassium Chloride [Klor-Con M10] 10 meq PO BID 12/22/22 12/22/22


 


Rosuvastatin [Crestor] 20 mg PO DAILY 12/22/22 12/22/22


 


Spironolactone [Aldactone] 25 mg PO DAILY 12/22/22 12/22/22


 


Sucralfate [Carafate] 1 gm PO ACHS 12/22/22 12/22/22


 


Tiadylt Er 360 Mg 360 mg PO DAILY 12/22/22 12/22/22


 


Zinc Gluconate [Zinc] 50 mg PO DAILY 12/22/22 12/22/22











                                    Allergies











Allergy/AdvReac Type Severity Reaction Status Date / Time


 


amoxicillin [From Augmentin] Allergy  Unknown Verified 12/29/22 01:02


 


clavulanic acid Allergy  Unknown Verified 12/29/22 01:02





[From Augmentin]     


 


codeine Allergy  Unknown Verified 12/29/22 01:02


 


metformin Allergy  Nausea & Verified 12/29/22 01:02





   Vomiting  


 


Tetracyclines Allergy  Unknown Verified 12/29/22 01:02














Review of Systems


ROS Statement: 


Those systems with pertinent positive or pertinent negative responses have been 

documented in the HPI.





ROS Other: All systems not noted in ROS Statement are negative.





Past Medical History


Past Medical History: Atrial Fibrillation, Heart Failure, COPD


History of Any Multi-Drug Resistant Organisms: None Reported


Past Surgical History: Ablation


Additional Past Surgical History / Comment(s): Brain aneursym 3/3/2020


Past Psychological History: No Psychological Hx Reported


Smoking Status: Former smoker


Past Alcohol Use History: None Reported


Past Drug Use History: None Reported





- Past Family History


  ** Father


Family Medical History: Myocardial Infarction (MI), Rheumatoid Arthritis (RA)





General Exam


Limitations: no limitations


General appearance: alert, in distress (In moderate respiratory distress)


Head exam: Present: atraumatic, normocephalic, normal inspection


Eye exam: Present: normal appearance, PERRL


Pupils: Present: normal accommodation


ENT exam: Present: normal exam, normal oropharynx


Neck exam: Present: normal inspection


Respiratory exam: Present: other (Tachypnea without any wheezing or rhonchi 

heard)


Cardiovascular Exam: Present: regular rate, normal rhythm, normal heart sounds


GI/Abdominal exam: Present: soft, normal bowel sounds


Extremities exam: Present: normal inspection, full ROM


Back exam: Present: normal inspection, full ROM


Neurological exam: Present: alert, oriented X3, CN II-XII intact


Psychiatric exam: Present: normal affect, normal mood


Skin exam: Present: warm, dry





Course


                                   Vital Signs











  12/29/22 12/29/22 12/29/22





  00:55 01:02 01:13


 


Temperature 98.2 F  


 


Pulse Rate 51 L 55 L 


 


Respiratory 26 H 26 H 26 H





Rate   


 


Blood Pressure 99/66 105/58 


 


O2 Sat by Pulse 96 97 





Oximetry   














  12/29/22





  02:01


 


Temperature 


 


Pulse Rate 53 L


 


Respiratory 





Rate 


 


Blood Pressure 90/61


 


O2 Sat by Pulse 





Oximetry 














EKG Findings





- EKG Comments:


EKG Findings:: An EKG was obtained and was interpreted by myself showing a rate 

of 53, QRS duration of 90 and QTC of 439.  This EKG showed a junctional rhythm 

with no ST segment elevation or depression noted.





Medical Decision Making





- Medical Decision Making


Was pt. sent in by a medical professional or institution?


@  -No


Did you speak to anyone other than the patient for history?  


@  -EMS


Did you review nursing and triage notes? 


@  -Nursing triage notes were reviewed


Were old charts reviewed? 


@  -Yes, previous hospitalization records were reviewed


Differential Diagnosis? 


@  -COPD exacerbation, asthma exacerbation, CHF exacerbation, ACS, PE, 

pneumothorax, pneumonia


EKG interpreted by me (3pts min.)?


@  -As above


X-rays interpreted by me (1pt min.)?


@  -Chest x-ray was obtained and was interpreted by myself.  Chest x-ray showed 

right lower lobe pneumonia and right pleural effusion which is increased exam.  

There is no heart failure.  There is no cardiac medically.


CT interpreted by me (1pt min.)?


@  -CTA of the chest was obtained because the patient's d-dimer was elevated.  

CT of the chest showed no evidence of PE but there was cardiomegaly.  There is 

moderate pulmonary infiltrates and atelectasis in the right middle lobe and 

right lower lobe.  There was moderate right pleural effusion.


U/S interpreted by me (1pt. min.)?


@  -[none]


What testing was considered but not performed? (CT, X-rays, U/S, labs)? Why?


@None


What meds were considered but not given? Why?


@  -[none]


Did you discuss the management of the patient with other professionals?


@  -Yes, the accepting admitting physician


Did you reconcile home meds?


@  -[none]


Was smoking cessation discussed for >3mins.?


@  -[none]


Was critical care preformed (if so, how long)?


@  -[none]


Were there social determinants of health that impacted care today? How? 

(Homelessness, low income, unemployed, alcoholism, drug addiction, 

transportation, low edu. Level, literacy, decrease access to med. care, penitentiary, 

rehab)?


@  -No


Was there de-escalation of care discussed even if they declined? (Discuss DNR or

 withdrawal of care, Hospice)?


@  -No


What co-morbidities impacted this encounter? (DM, HTN, Smoking, COPD, CAD, 

Cancer, CVA, Hep., AIDS, mental health diagnosis, sleep apnea, morbid obesity)?


@  -Hypertension, diabetes, COPD


Was patient admitted / discharged?


@  -The patient was seen and evaluated in emergency department.  Physical exam, 

the patient was resting in bed without any acute distress.  Vital signs 

admission were stable and within normal limits.  Vital signs did not show any 

signs of fever or respiratory distress.  The patient did have some mild 

tachypnea however but refused BiPAP at this time.  Laboratory workup was 

obtained and was largely within normal limits however d-dimer was elevated.  

Urinalysis was obtained however the patient did not complain of any urinary 

symptoms and was significantly contaminated with 53 squamous cells.  It was 

unlikely that the patient had a UTI at this time therefore she will not be 

treated for UTI.  Both chest x-ray and CTA of the chest were obtained and showed

 consistency with healthcare associated pneumonia versus CHF exacerbation.  The 

patient on reevaluation stated that she had improvement of her symptoms after 

she was given a breathing treatment by EMS.  The patient was started on IV 

antibiotics for healthcare associated pneumonia.  The patient did require 

further inpatient management and the patient was accepted by the covering 

physician, Dr. Galindo.  The patient was admitted in stable condition.


Undiagnosed new problem with uncertain prognosis?


@  -[none]


Drug Therapy requiring intensive monitoring for toxicity (Heparin, Nitro, 

Insulin, Cardizem)?


@  -[none]


Were any procedures done?


@  -[none]


Diagnosis/symptom?


@  -Healthcare associated pneumonia


Acute, or Chronic, or Acute on Chronic?


@  -Acute


Uncomplicated (without systemic symptoms) or Complicated (systemic symptoms)?


@  -Complicated


Side effects of treatment?


@  -[none]


Exacerbation, Progression, or Severe Exacerbation]


@  -[no]


Poses a threat to life or bodily function?


@  -[no]








- Lab Data


Result diagrams: 


                                 12/29/22 01:21





                                 12/29/22 01:21


                                   Lab Results











  12/29/22 12/29/22 12/29/22 Range/Units





  01:21 01:21 01:21 


 


WBC  8.1    (3.8-10.6)  k/uL


 


RBC  3.96    (3.80-5.40)  m/uL


 


Hgb  8.5 L    (11.4-16.0)  gm/dL


 


Hct  29.0 L    (34.0-46.0)  %


 


MCV  73.3 L    (80.0-100.0)  fL


 


MCH  21.3 L    (25.0-35.0)  pg


 


MCHC  29.1 L    (31.0-37.0)  g/dL


 


RDW  20.8 H    (11.5-15.5)  %


 


Plt Count  195    (150-450)  k/uL


 


MPV  11.3    


 


Neutrophils %  70    %


 


Lymphocytes %  17    %


 


Monocytes %  8    %


 


Eosinophils %  2    %


 


Basophils %  1    %


 


Neutrophils #  5.7    (1.3-7.7)  k/uL


 


Lymphocytes #  1.4    (1.0-4.8)  k/uL


 


Monocytes #  0.7    (0-1.0)  k/uL


 


Eosinophils #  0.1    (0-0.7)  k/uL


 


Basophils #  0.0    (0-0.2)  k/uL


 


Hypochromasia  Marked    


 


Poikilocytosis  Marked    


 


Anisocytosis  Moderate    


 


Microcytosis  Marked    


 


PT   12.2 H   (9.0-12.0)  sec


 


INR   1.2 H   (<1.2)  


 


APTT   25.2   (22.0-30.0)  sec


 


D-Dimer   2.55 H   (<0.60)  mg/L FEU


 


Sodium     (137-145)  mmol/L


 


Potassium     (3.5-5.1)  mmol/L


 


Chloride     ()  mmol/L


 


Carbon Dioxide     (22-30)  mmol/L


 


Anion Gap     mmol/L


 


BUN     (7-17)  mg/dL


 


Creatinine     (0.52-1.04)  mg/dL


 


Est GFR (CKD-EPI)AfAm     (>60 ml/min/1.73 sqM)  


 


Est GFR (CKD-EPI)NonAf     (>60 ml/min/1.73 sqM)  


 


Glucose     (74-99)  mg/dL


 


Calcium     (8.4-10.2)  mg/dL


 


Magnesium     (1.6-2.3)  mg/dL


 


Total Bilirubin     (0.2-1.3)  mg/dL


 


AST     (14-36)  U/L


 


ALT     (4-34)  U/L


 


Alkaline Phosphatase     ()  U/L


 


Troponin I     (0.000-0.034)  ng/mL


 


NT-Pro-B Natriuret Pep     pg/mL


 


Total Protein     (6.3-8.2)  g/dL


 


Albumin     (3.5-5.0)  g/dL


 


Urine Color    Dark Yellow  


 


Urine Appearance    Turbid H  (Clear)  


 


Urine pH    5.0  (5.0-8.0)  


 


Ur Specific Gravity    1.025  (1.001-1.035)  


 


Urine Protein    1+ H  (Negative)  


 


Urine Glucose (UA)    3+ H  (Negative)  


 


Urine Ketones    Negative  (Negative)  


 


Urine Blood    Moderate H  (Negative)  


 


Urine Nitrite    Negative  (Negative)  


 


Urine Bilirubin    Negative  (Negative)  


 


Urine Urobilinogen    4.0  (<2.0)  mg/dL


 


Ur Leukocyte Esterase    Large H  (Negative)  


 


Urine RBC    115 H  (0-5)  /hpf


 


Urine WBC    56 H  (0-5)  /hpf


 


Urine WBC Clumps    Many H  (None)  /hpf


 


Ur Squamous Epith Cells    53 H  (0-4)  /hpf


 


Urine Bacteria    Occasional H  (None)  /hpf


 


Hyaline Casts    124 H  (0-2)  /lpf


 


Urine Mucus    Many H  (None)  /hpf














  12/29/22 12/29/22 12/29/22 Range/Units





  01:21 01:21 01:21 


 


WBC     (3.8-10.6)  k/uL


 


RBC     (3.80-5.40)  m/uL


 


Hgb     (11.4-16.0)  gm/dL


 


Hct     (34.0-46.0)  %


 


MCV     (80.0-100.0)  fL


 


MCH     (25.0-35.0)  pg


 


MCHC     (31.0-37.0)  g/dL


 


RDW     (11.5-15.5)  %


 


Plt Count     (150-450)  k/uL


 


MPV     


 


Neutrophils %     %


 


Lymphocytes %     %


 


Monocytes %     %


 


Eosinophils %     %


 


Basophils %     %


 


Neutrophils #     (1.3-7.7)  k/uL


 


Lymphocytes #     (1.0-4.8)  k/uL


 


Monocytes #     (0-1.0)  k/uL


 


Eosinophils #     (0-0.7)  k/uL


 


Basophils #     (0-0.2)  k/uL


 


Hypochromasia     


 


Poikilocytosis     


 


Anisocytosis     


 


Microcytosis     


 


PT     (9.0-12.0)  sec


 


INR     (<1.2)  


 


APTT     (22.0-30.0)  sec


 


D-Dimer     (<0.60)  mg/L FEU


 


Sodium  136 L    (137-145)  mmol/L


 


Potassium  4.6    (3.5-5.1)  mmol/L


 


Chloride  98    ()  mmol/L


 


Carbon Dioxide  30    (22-30)  mmol/L


 


Anion Gap  8    mmol/L


 


BUN  21 H    (7-17)  mg/dL


 


Creatinine  1.07 H    (0.52-1.04)  mg/dL


 


Est GFR (CKD-EPI)AfAm  63    (>60 ml/min/1.73 sqM)  


 


Est GFR (CKD-EPI)NonAf  55    (>60 ml/min/1.73 sqM)  


 


Glucose  110 H    (74-99)  mg/dL


 


Calcium  8.3 L    (8.4-10.2)  mg/dL


 


Magnesium  2.0    (1.6-2.3)  mg/dL


 


Total Bilirubin  0.8    (0.2-1.3)  mg/dL


 


AST  31    (14-36)  U/L


 


ALT  50 H    (4-34)  U/L


 


Alkaline Phosphatase  109    ()  U/L


 


Troponin I   <0.012   (0.000-0.034)  ng/mL


 


NT-Pro-B Natriuret Pep    717  pg/mL


 


Total Protein  6.4    (6.3-8.2)  g/dL


 


Albumin  3.5    (3.5-5.0)  g/dL


 


Urine Color     


 


Urine Appearance     (Clear)  


 


Urine pH     (5.0-8.0)  


 


Ur Specific Gravity     (1.001-1.035)  


 


Urine Protein     (Negative)  


 


Urine Glucose (UA)     (Negative)  


 


Urine Ketones     (Negative)  


 


Urine Blood     (Negative)  


 


Urine Nitrite     (Negative)  


 


Urine Bilirubin     (Negative)  


 


Urine Urobilinogen     (<2.0)  mg/dL


 


Ur Leukocyte Esterase     (Negative)  


 


Urine RBC     (0-5)  /hpf


 


Urine WBC     (0-5)  /hpf


 


Urine WBC Clumps     (None)  /hpf


 


Ur Squamous Epith Cells     (0-4)  /hpf


 


Urine Bacteria     (None)  /hpf


 


Hyaline Casts     (0-2)  /lpf


 


Urine Mucus     (None)  /hpf














Disposition


Clinical Impression: 


 HCAP (healthcare-associated pneumonia), Shortness of breath





Disposition: ADMITTED AS IP TO THIS [facility]


Condition: Stable


Is patient prescribed a controlled substance at d/c from ED?: No


Referrals: 


None,Stated [Primary Care Provider] - 1-2 days


Time of Disposition: 04:00


Decision to Admit Reason: Admit from EC


Decision Date: 12/29/22


Decision Time: 04:00

## 2022-12-29 NOTE — P.GSCN
History of Present Illness


Consult date: 12/29/22


History of present illness: 





CHIEF COMPLAINT: Shortness of breath


Reason for consult GI bleed





HISTORY OF PRESENT ILLNESS: This is a 67-year-old female who presented to the 

hospital with complaints of worsening shortness of breath and is currently being

treated for pneumonia.  Patient reports passing bright red blood early this 

morning.  She has been having rectal pain.  Does have a history of hemorrhoids. 

Last week she reports having both black stools and bright red stools 

intermittently.  Patient does have prior history of GI bleed.  She reports her 

last EGD and colonoscopy were in February 2022 there was evidence of 

hemorrhoids.  She was supposed to have capsule endoscopy which did not get 

completed.  Patient was recently here [facility] with a GI bleed on 

12/22 and required transfer to [facility] due to GI bleed.  No endoscopies 

done at that time.  Patient has Plavix listed as a home med.  She reports that 

she has not been taking it since she's been having issues with GI bleeding.  She

has a known history of atrial fibrillation and had a watchman procedure done 

because she could not tolerate anticoagulation due to GI bleeding.  Hemoglobin 

on admission 8.5 patient denies any abdominal pain.  Denies any nausea or 

vomiting.





PAST MEDICAL HISTORY: 


See list.





PAST SURGICAL HISTORY: 


See list.





MEDICATIONS: 


See list.





ALLERGIES: 


See list.





SOCIAL HISTORY: No illicit drug use.  





REVIEW OF SYSTEMS: 


CONSTITUTIONAL: Denies fever or chills.


HEENT: Denies blurred vision, vision changes, or eye pain. Denies hemoptysis 


ENDOCRINE: Denies heat or cold intolerance.


CARDIOVASCULAR: Denies chest pain or pressure.


RESPIRATORY: No shortness of breath. 


GASTROINTESTINAL: Denies abdominal pain. Denies nausea or vomiting.


NEURO: Denies history of seizures.


PSYCH: No depression or suicidal ideation


HEMATOLOGIC: Denies bleeding disorders.


LYMPHATIC:  The patient denies any lumps and bumps around the neck. 


GENITOURINARY:  Denies any blood in urine or increased urinary frequency.  


MUSCULOSKELETAL:  Denies myalgias. Denies joint swelling. Denies decreased range

of motion beyond patients baseline.


SKIN: Denies pruitis. Denies rash.





PHYSICAL EXAM: 


VITAL SIGNS: Reviewed


GENERAL: Well-developed in no acute distress. 


HEENT:  No sclera icterus. Extraocular movements grossly intact.  Moist buccal 

mucosa. 


Head is atraumatic, normocephalic. Hears conversational speech. No nasal 

drainage.


NECK:  Supple without lymphadenopathy.


CHEST:  Non-labored respirations and equal bilateral excursions. 


CARDIOVASCULAR:  Palpable 2+ radial pulses.


ABDOMEN:  Soft.  Nondistended. Nontender


MUSCULOSKELETAL:  No clubbing or cyanosis.


NEUROLOGIC:  No focal or lateralizing signs.  Cranial nerves II through XII 

grossly intact.


PSYCH:  Appropriate affect.  Alert and oriented to person, place and time.


SKIN: Well perfused.  Good skin turgor. 


Rectal: Patient does have evidence of a large external hemorrhoid





LABORATORY DATA:


WBC is 8.1 Hgb 8.5 platelets 195


INR 1.2 d-dimer 2.55


Sodium is 136 potassium 4.6 creatinine 1.07


Influenza A and B not detected, RSV not detected, COVID-19 detected





IMAGING:


Chest CTA cardiomegaly.  Moderate pulmonary infiltrates and atelectasis in the 

right middle lobe and right lower lobe.  Moderate right pleural effusion.  No 

evidence of PE.








ASSESSMENT: 


1.  Acute GI bleed with bright red blood per rectum and rectal pain


2.  Microcytic anemia


3.  History of hemorrhoids


4.  Pneumonia


5.  Acute COPD exacerbation


6.  History of atrial fibrillation status post watchman procedure


7.  History of hypertension





PLAN: 


-Continue monitor for any signs or symptoms of bleeding


-Continue monitor hemoglobin


-Plavix discontinued


-Tucks pads ordered for hemorrhoids


-Continue PPI


-Continue supportive 


-Further recommendations forthcoming per surgeon





Physician Assistant note has been reviewed by physician. Signing provider agrees

with the documented findings, assessment, and plan of care. 





Past Medical History


Past Medical History: Atrial Fibrillation, Heart Failure, COPD


History of Any Multi-Drug Resistant Organisms: None Reported


Past Surgical History: Ablation


Additional Past Surgical History / Comment(s): Brain aneursym 3/3/2020


Past Psychological History: No Psychological Hx Reported


Smoking Status: Former smoker


Past Alcohol Use History: None Reported


Past Drug Use History: None Reported





- Past Family History


  ** Father


Family Medical History: Myocardial Infarction (MI), Rheumatoid Arthritis (RA)





Medications and Allergies


                                Home Medications











 Medication  Instructions  Recorded  Confirmed  Type


 


Albuterol Sulfate [Ventolin HFA] 2 puff INHALATION RT-Q6H PRN 12/22/20 12/29/22 

History


 


Pantoprazole Sodium [Protonix] 40 mg PO BID 12/22/20 12/29/22 History


 


busPIRone HCl [Buspar] 10 mg PO TID PRN 12/22/20 12/29/22 History


 


ALPRAZolam [Xanax] 0.5 mg PO DAILY PRN 12/22/22 12/29/22 History


 


Aspirin EC [Ecotrin Low Dose] 81 mg PO DAILY 12/22/22 12/29/22 History


 


Biotin 5 mg PO DAILY 12/22/22 12/29/22 History


 


Bumetanide [BUMEX] 2 mg PO BID 12/22/22 12/29/22 History


 


Cholecalciferol [Vitamin D3 (25 25 mcg PO DAILY 12/22/22 12/29/22 History





Mcg = 1000 Iu)]    


 


Clopidogrel [Plavix] 75 mg PO DAILY 12/22/22 12/29/22 History


 


Cyanocobalamin [Vitamin B-12] 500 mcg PO DAILY 12/22/22 12/29/22 History


 


Dapagliflozin Propanediol [Farxiga] 10 mg PO DAILY 12/22/22 12/29/22 History


 


Dicyclomine [Bentyl] 10 mg PO BID PRN 12/22/22 12/29/22 History


 


Escitalopram [Lexapro] 10 mg PO DAILY 12/22/22 12/29/22 History


 


Fluticasone/Umeclidin/Vilanter 1 puff INHALATION RT-DAILY 12/22/22 12/29/22 

History





[Trelegy Ellipta 100-62.5-25]    


 


Ipratropium-Albuterol Nebulize 3 ml INHALATION RT-Q6H PRN 12/22/22 12/29/22 

History





[Duoneb 0.5 mg-3 mg/3 ml Soln]    


 


Isosorbide Mononitrate ER [Imdur] 30 mg PO DAILY 12/22/22 12/29/22 History


 


Losartan Potassium [Cozaar] 25 mg PO DAILY 12/22/22 12/29/22 History


 


Magnesium 250 mg PO DAILY 12/22/22 12/29/22 History


 


Metoprolol Succinate [Toprol XL] 200 mg PO DAILY 12/22/22 12/29/22 History


 


Mirtazapine [Remeron] 45 mg PO HS 12/22/22 12/29/22 History


 


Potassium Chloride [Klor-Con M10] 10 meq PO BID 12/22/22 12/29/22 History


 


Rosuvastatin [Crestor] 20 mg PO DAILY 12/22/22 12/29/22 History


 


Spironolactone [Aldactone] 25 mg PO DAILY 12/22/22 12/29/22 History


 


Sucralfate [Carafate] 1 gm PO ACHS 12/22/22 12/29/22 History


 


Zinc Gluconate [Zinc] 50 mg PO DAILY 12/22/22 12/29/22 History


 


Levofloxacin [Levaquin] 750 mg PO DAILY 12/29/22 12/29/22 History


 


dilTIAZem HCL [Tiadylt ER] 300 mg PO DAILY 12/29/22 12/29/22 History








                                    Allergies











Allergy/AdvReac Type Severity Reaction Status Date / Time


 


amoxicillin [From Augmentin] Allergy  Unknown Verified 12/29/22 01:02


 


clavulanic acid Allergy  Unknown Verified 12/29/22 01:02





[From Augmentin]     


 


codeine Allergy  Unknown Verified 12/29/22 01:02


 


metformin Allergy  Nausea & Verified 12/29/22 01:02





   Vomiting  


 


Tetracyclines Allergy  Unknown Verified 12/29/22 01:02














Surgical - Exam


                                   Vital Signs











Temp Pulse Resp BP Pulse Ox


 


 98.2 F   51 L  26 H  99/66   96 


 


 12/29/22 00:55  12/29/22 00:55  12/29/22 00:55  12/29/22 00:55  12/29/22 00:55














Results





- Labs





                                 12/29/22 11:07





                                 12/29/22 01:21


                  Abnormal Lab Results - Last 24 Hours (Table)











  12/29/22 12/29/22 12/29/22 Range/Units





  01:21 01:21 01:21 


 


Hgb  8.5 L    (11.4-16.0)  gm/dL


 


Hct  29.0 L    (34.0-46.0)  %


 


MCV  73.3 L    (80.0-100.0)  fL


 


MCH  21.3 L    (25.0-35.0)  pg


 


MCHC  29.1 L    (31.0-37.0)  g/dL


 


RDW  20.8 H    (11.5-15.5)  %


 


PT   12.2 H   (9.0-12.0)  sec


 


INR   1.2 H   (<1.2)  


 


D-Dimer   2.55 H   (<0.60)  mg/L FEU


 


Sodium     (137-145)  mmol/L


 


BUN     (7-17)  mg/dL


 


Creatinine     (0.52-1.04)  mg/dL


 


Glucose     (74-99)  mg/dL


 


Calcium     (8.4-10.2)  mg/dL


 


ALT     (4-34)  U/L


 


Urine Appearance    Turbid H  (Clear)  


 


Urine Protein    1+ H  (Negative)  


 


Urine Glucose (UA)    3+ H  (Negative)  


 


Urine Blood    Moderate H  (Negative)  


 


Ur Leukocyte Esterase    Large H  (Negative)  


 


Urine RBC    115 H  (0-5)  /hpf


 


Urine WBC    56 H  (0-5)  /hpf


 


Urine WBC Clumps    Many H  (None)  /hpf


 


Ur Squamous Epith Cells    53 H  (0-4)  /hpf


 


Urine Bacteria    Occasional H  (None)  /hpf


 


Hyaline Casts    124 H  (0-2)  /lpf


 


Urine Mucus    Many H  (None)  /hpf














  12/29/22 Range/Units





  01:21 


 


Hgb   (11.4-16.0)  gm/dL


 


Hct   (34.0-46.0)  %


 


MCV   (80.0-100.0)  fL


 


MCH   (25.0-35.0)  pg


 


MCHC   (31.0-37.0)  g/dL


 


RDW   (11.5-15.5)  %


 


PT   (9.0-12.0)  sec


 


INR   (<1.2)  


 


D-Dimer   (<0.60)  mg/L FEU


 


Sodium  136 L  (137-145)  mmol/L


 


BUN  21 H  (7-17)  mg/dL


 


Creatinine  1.07 H  (0.52-1.04)  mg/dL


 


Glucose  110 H  (74-99)  mg/dL


 


Calcium  8.3 L  (8.4-10.2)  mg/dL


 


ALT  50 H  (4-34)  U/L


 


Urine Appearance   (Clear)  


 


Urine Protein   (Negative)  


 


Urine Glucose (UA)   (Negative)  


 


Urine Blood   (Negative)  


 


Ur Leukocyte Esterase   (Negative)  


 


Urine RBC   (0-5)  /hpf


 


Urine WBC   (0-5)  /hpf


 


Urine WBC Clumps   (None)  /hpf


 


Ur Squamous Epith Cells   (0-4)  /hpf


 


Urine Bacteria   (None)  /hpf


 


Hyaline Casts   (0-2)  /lpf


 


Urine Mucus   (None)  /hpf








                      Microbiology - Last 24 Hours (Table)











 12/29/22 01:21 Urine Culture - Preliminary





 Urine,Voided 








                                 Diabetes panel











  12/29/22 Range/Units





  01:21 


 


Sodium  136 L  (137-145)  mmol/L


 


Potassium  4.6  (3.5-5.1)  mmol/L


 


Chloride  98  ()  mmol/L


 


Carbon Dioxide  30  (22-30)  mmol/L


 


BUN  21 H  (7-17)  mg/dL


 


Creatinine  1.07 H  (0.52-1.04)  mg/dL


 


Glucose  110 H  (74-99)  mg/dL


 


Calcium  8.3 L  (8.4-10.2)  mg/dL


 


AST  31  (14-36)  U/L


 


ALT  50 H  (4-34)  U/L


 


Alkaline Phosphatase  109  ()  U/L


 


Total Protein  6.4  (6.3-8.2)  g/dL


 


Albumin  3.5  (3.5-5.0)  g/dL








                                  Calcium panel











  12/29/22 Range/Units





  01:21 


 


Calcium  8.3 L  (8.4-10.2)  mg/dL


 


Albumin  3.5  (3.5-5.0)  g/dL








                                 Pituitary panel











  12/29/22 Range/Units





  01:21 


 


Sodium  136 L  (137-145)  mmol/L


 


Potassium  4.6  (3.5-5.1)  mmol/L


 


Chloride  98  ()  mmol/L


 


Carbon Dioxide  30  (22-30)  mmol/L


 


BUN  21 H  (7-17)  mg/dL


 


Creatinine  1.07 H  (0.52-1.04)  mg/dL


 


Glucose  110 H  (74-99)  mg/dL


 


Calcium  8.3 L  (8.4-10.2)  mg/dL








                                  Adrenal panel











  12/29/22 Range/Units





  01:21 


 


Sodium  136 L  (137-145)  mmol/L


 


Potassium  4.6  (3.5-5.1)  mmol/L


 


Chloride  98  ()  mmol/L


 


Carbon Dioxide  30  (22-30)  mmol/L


 


BUN  21 H  (7-17)  mg/dL


 


Creatinine  1.07 H  (0.52-1.04)  mg/dL


 


Glucose  110 H  (74-99)  mg/dL


 


Calcium  8.3 L  (8.4-10.2)  mg/dL


 


Total Bilirubin  0.8  (0.2-1.3)  mg/dL


 


AST  31  (14-36)  U/L


 


ALT  50 H  (4-34)  U/L


 


Alkaline Phosphatase  109  ()  U/L


 


Total Protein  6.4  (6.3-8.2)  g/dL


 


Albumin  3.5  (3.5-5.0)  g/dL

## 2022-12-29 NOTE — P.HPIM
History of Present Illness


H&P Date: 12/29/22


Chief Complaint: shortness of breath





67 year old female with afib not on anticoagulation , COPD , diastolic CHF





patient was discharge from the hospital about a week ago, she felt well for 

couple days, and then started having progressive SOB and diffuse body aches over

the past couple days. she felt that she could not breath today and called EMS to

bring her in. she still has 1 pill of levaquin left from his discharge 

prescription 





she reports severe shortness of breath, chronic occasional wheezing, positive 

orthopnea but no leg edema, PNDs,. she has chronic wet cough not improving , she

was recently hospitalized for acute COPD exacerbationand pneumonia .  she is on 

4 L Oxygen at home. she still having sore throat and feels congested. she is 

having chills but no fever. she reports that breathing hurts all her chest and 

back





she also reports occasional blood in her stool due to hemorrhoids . 





she denies any abd pain , nausea or vomiting, denies any urinary changes. 





work up in the ED 


elevated Ddimer , CTA of the chest no acute PE, but showed multifocal in

filterates and moderate right pleural effusion 





acute respiratory viral panel negative 





hemoglobin significantly dropped from 18.6 to 8.5 over 1 year , again she   

reports occasional blood in her stool due to hemorrhoids . she is not on blood 

thinners at this time, but does take plavix








upon reviewing medical records, she was seen in our ED about a week ago , and 

was transferred to another facility due to no GI coverage, and thats when she 

was first noted to have a significant drop in her hemoglobin down to 7.6 form 18

a year ago , and she was noticed to have a bloody bowel movement while in the ED







Review of Systems








Pertinent positives as noted in HPI. All other systems were reviewed and are 

negative








Past Medical History


Past Medical History: Atrial Fibrillation, Heart Failure, COPD


History of Any Multi-Drug Resistant Organisms: None Reported


Past Surgical History: Ablation


Additional Past Surgical History / Comment(s): Brain aneursym 3/3/2020


Past Psychological History: No Psychological Hx Reported


Smoking Status: Former smoker


Past Alcohol Use History: None Reported


Past Drug Use History: None Reported





- Past Family History


  ** Father


Family Medical History: Myocardial Infarction (MI), Rheumatoid Arthritis (RA)





Medications and Allergies


                                Home Medications











 Medication  Instructions  Recorded  Confirmed  Type


 


Albuterol Sulfate [Ventolin HFA] 2 puff INHALATION RT-Q6H PRN 12/22/20 12/22/22 

History


 


Pantoprazole Sodium [Protonix] 40 mg PO BID 12/22/20 12/22/22 History


 


busPIRone HCl [Buspar] 10 mg PO TID PRN 12/22/20 12/22/22 History


 


ALPRAZolam [Xanax] 0.5 mg PO DAILY PRN 12/22/22 12/22/22 History


 


Aspirin EC [Ecotrin Low Dose] 81 mg PO DAILY 12/22/22 12/22/22 History


 


Biotin 5 mg PO DAILY 12/22/22 12/22/22 History


 


Bumetanide [BUMEX] 2 mg PO BID 12/22/22 12/22/22 History


 


Cholecalciferol [Vitamin D3 (25 25 mcg PO DAILY 12/22/22 12/22/22 History





Mcg = 1000 Iu)]    


 


Clopidogrel [Plavix] 75 mg PO DAILY 12/22/22 12/22/22 History


 


Cyanocobalamin [Vitamin B-12] 500 mcg PO DAILY 12/22/22 12/22/22 History


 


Dapagliflozin Propanediol [Farxiga] 10 mg PO DAILY 12/22/22 12/22/22 History


 


Dicyclomine [Bentyl] 10 mg PO BID PRN 12/22/22 12/22/22 History


 


Escitalopram [Lexapro] 10 mg PO DAILY 12/22/22 12/22/22 History


 


Fluticasone/Umeclidin/Vilanter 1 puff INHALATION RT-DAILY 12/22/22 12/22/22 

History





[Trelegy Ellipta 100-62.5-25]    


 


Ipratropium-Albuterol Nebulize 3 ml INHALATION RT-Q6H PRN 12/22/22 12/22/22 Hi

story





[Duoneb 0.5 mg-3 mg/3 ml Soln]    


 


Isosorbide Mononitrate ER [Imdur] 30 mg PO DAILY 12/22/22 12/22/22 History


 


Losartan Potassium [Cozaar] 25 mg PO DAILY 12/22/22 12/22/22 History


 


Magnesium 250 mg PO DAILY 12/22/22 12/22/22 History


 


Metoprolol Succinate [Toprol XL] 200 mg PO DAILY 12/22/22 12/22/22 History


 


Mirtazapine [Remeron] 45 mg PO HS 12/22/22 12/22/22 History


 


Potassium Chloride [Klor-Con M10] 10 meq PO BID 12/22/22 12/22/22 History


 


Rosuvastatin [Crestor] 20 mg PO DAILY 12/22/22 12/22/22 History


 


Spironolactone [Aldactone] 25 mg PO DAILY 12/22/22 12/22/22 History


 


Sucralfate [Carafate] 1 gm PO ACHS 12/22/22 12/22/22 History


 


Tiadylt Er 360 Mg 360 mg PO DAILY 12/22/22 12/22/22 History


 


Zinc Gluconate [Zinc] 50 mg PO DAILY 12/22/22 12/22/22 History








                                    Allergies











Allergy/AdvReac Type Severity Reaction Status Date / Time


 


amoxicillin [From Augmentin] Allergy  Unknown Verified 12/29/22 01:02


 


clavulanic acid Allergy  Unknown Verified 12/29/22 01:02





[From Augmentin]     


 


codeine Allergy  Unknown Verified 12/29/22 01:02


 


metformin Allergy  Nausea & Verified 12/29/22 01:02





   Vomiting  


 


Tetracyclines Allergy  Unknown Verified 12/29/22 01:02














Physical Exam


Vitals: 


                                   Vital Signs











  Temp Pulse Resp BP Pulse Ox


 


 12/29/22 04:50   57 L  24  106/54  97


 


 12/29/22 02:01   53 L   90/61 


 


 12/29/22 01:13    26 H  


 


 12/29/22 01:02   55 L  26 H  105/58  97


 


 12/29/22 00:55  98.2 F  51 L  26 H  99/66  96








                                Intake and Output











 12/28/22 12/28/22 12/29/22





 14:59 22:59 06:59


 


Other:   


 


  Weight   102.058 kg














Results


CBC & Chem 7: 


                                 12/29/22 01:21





                                 12/29/22 01:21


Labs: 


                  Abnormal Lab Results - Last 24 Hours (Table)











  12/29/22 12/29/22 12/29/22 Range/Units





  01:21 01:21 01:21 


 


Hgb  8.5 L    (11.4-16.0)  gm/dL


 


Hct  29.0 L    (34.0-46.0)  %


 


MCV  73.3 L    (80.0-100.0)  fL


 


MCH  21.3 L    (25.0-35.0)  pg


 


MCHC  29.1 L    (31.0-37.0)  g/dL


 


RDW  20.8 H    (11.5-15.5)  %


 


PT   12.2 H   (9.0-12.0)  sec


 


INR   1.2 H   (<1.2)  


 


D-Dimer   2.55 H   (<0.60)  mg/L FEU


 


Sodium     (137-145)  mmol/L


 


BUN     (7-17)  mg/dL


 


Creatinine     (0.52-1.04)  mg/dL


 


Glucose     (74-99)  mg/dL


 


Calcium     (8.4-10.2)  mg/dL


 


ALT     (4-34)  U/L


 


Urine Appearance    Turbid H  (Clear)  


 


Urine Protein    1+ H  (Negative)  


 


Urine Glucose (UA)    3+ H  (Negative)  


 


Urine Blood    Moderate H  (Negative)  


 


Ur Leukocyte Esterase    Large H  (Negative)  


 


Urine RBC    115 H  (0-5)  /hpf


 


Urine WBC    56 H  (0-5)  /hpf


 


Urine WBC Clumps    Many H  (None)  /hpf


 


Ur Squamous Epith Cells    53 H  (0-4)  /hpf


 


Urine Bacteria    Occasional H  (None)  /hpf


 


Hyaline Casts    124 H  (0-2)  /lpf


 


Urine Mucus    Many H  (None)  /hpf














  12/29/22 Range/Units





  01:21 


 


Hgb   (11.4-16.0)  gm/dL


 


Hct   (34.0-46.0)  %


 


MCV   (80.0-100.0)  fL


 


MCH   (25.0-35.0)  pg


 


MCHC   (31.0-37.0)  g/dL


 


RDW   (11.5-15.5)  %


 


PT   (9.0-12.0)  sec


 


INR   (<1.2)  


 


D-Dimer   (<0.60)  mg/L FEU


 


Sodium  136 L  (137-145)  mmol/L


 


BUN  21 H  (7-17)  mg/dL


 


Creatinine  1.07 H  (0.52-1.04)  mg/dL


 


Glucose  110 H  (74-99)  mg/dL


 


Calcium  8.3 L  (8.4-10.2)  mg/dL


 


ALT  50 H  (4-34)  U/L


 


Urine Appearance   (Clear)  


 


Urine Protein   (Negative)  


 


Urine Glucose (UA)   (Negative)  


 


Urine Blood   (Negative)  


 


Ur Leukocyte Esterase   (Negative)  


 


Urine RBC   (0-5)  /hpf


 


Urine WBC   (0-5)  /hpf


 


Urine WBC Clumps   (None)  /hpf


 


Ur Squamous Epith Cells   (0-4)  /hpf


 


Urine Bacteria   (None)  /hpf


 


Hyaline Casts   (0-2)  /lpf


 


Urine Mucus   (None)  /hpf














Assessment and Plan


Assessment: 





progressive exertional dyspnea 


microcytic anemia , rule out GI bleed 


acute mild COPD exacerbation


acute  diastolic CHF exacerbation 


permanent afib  not on anticoagulation due to GI bleed





plan 


D dimer positive , CTA lungs no acute PE, but showed multifocal infilterates, 

and moderate right pleural effusion 


IV  diuretics lasix 40 mg BID 


PO systemic steroids 


azithromycin daily X3


duonebs scheduled and PRN 


resume home inhalers 


counseled to quit smoking , patient claims she quit 1 year ago 


monitor hemoglobin q6hr


general surgery consult for GI bleed


protonix IVP 80 mg , then 40 mg BID 


full liquid diet 


resume cardiac meds, continue with plavix unless hemoglobin drops any further , 

or patient has further  episode of bleeding 








chronic condition 


hypertension 


losartan and metoprolol 


resume isosorbide





hyperlipidemia 


resume statin 





check acute respiratory viral panel 





full code


DVT PPX mechanical dueto possible GI bleed

## 2022-12-29 NOTE — P.PN
Subjective


Progress Note Date: 12/29/22


Patient is a 67-year-old female with COPD and chronic hypoxic respiratory 

failure on 4 L nasal cannula, diastolic congestive heart failure, and atrial 

fibrillation on anticoagulation who presented to the ER secondary to diffuse 

body aches and shortness of breath.  On arrival to the ER she was found to be 

cachectic With a respiratory rate of 26.  Laboratory analysis demonstrated a 

hemoglobin of 8.5 (up from 7.8 on 12/22/22).  BUN 21, creatinine 1.07.  

Urinalysis was contaminated and patient without complaints of dysuria.  

Influenza A/B/RSV/COVID-19 testing were negative.  Chest x-ray showed right 

lower lobe pneumonia with right pleural effusion.  She underwent CT of the chest

which showed cardiomegaly, moderate pulmonary infiltrates and atelectasis in the

right middle and lower lobes with a moderate right pleural effusion and no 

evidence of pulmonary embolism.  She was given a dose of vancomycin and cefepime

in the ER for probable pneumonia.  Reaches for made for admission.





Of note the patient was here on 12/22 and was transferred to [facility] 

secondary to probable GI bleed.  She reports that they did not do a scope there 

but she had a scope earlier this year with an upper and lower.  They have 

recommended a pill endoscopy which she has not yet completed.  She reports she 

was diagnosed with pneumonia there and was taking an antibiotic and felt better 

for a few days and then again felt worse.  She has a pulmonologist that she was 

seen a couple of times on the [place] area and a cardiologist out of [place]. 

She is here visiting her son for the holidays.





Patient seen and examined at bedside.  She reports she feels slightly better 

than yesterday.  She continues to have some chest discomfort with deep breathing

and cough.  She denies any nausea or vomiting.  She is frustrated that she did 

not get scoped at [facility].  I informed her we will ask of records but 

that she likely could have an outpatient GI evaluation as her hemoglobin has 

remained able since 12/22.





General: nontoxic, no distress, appears older than stated age, obese


Derm: warm, dry


Head: atraumatic, normocephalic, symmetric


Eyes: EOMI, no lid lag, anicteric sclera


Mouth: no lip lesion, mucus membranes moist


Cardiovascular: S1S2 reg, no murmur, positive posterior tibial pulse bilateral, 


Lungs: Coarse breath sounds bilateral, no rhonchi, no rales , no accessory 

muscle use


Abdominal: soft,  nontender to palpation, no guarding, no appreciable 

organomegaly


Ext: no gross muscle atrophy, no edema, no contractures


Neuro:  CN II-XI grossly intact, no focal neuro deficits


Psych: Alert, oriented, appropriate affect 





CT chest as reviewed by myself reveals right sided lower lobe infiltrate, 

diffuse interstitial infiltrate, and moderate right sided pleural effusion.





Assessment/Plan: 


Pneumonia, failed outpatient treatment


Chronic Respiratory with hypoxia, 4L 


Acute exacerbation of COPD, mild 


- vanco and cefepime


- consult pulmonary 


- sputum culture, procalitonin, legionella antigen 


- pulm hydeine


- oral steroids, bronchdilators. 





Anemia


- probable GI source, no active bleeding at this time


- follow CBC


- check iron studiea


- obtain records from [facility]


- protonix 





HTN, controlled


- conitnue with losartan and metorpolol


- imdur


- follow BP





Compensated diastolic CHF 


Permanent A fib, not on AC at baseline due to HgB 








DVT prophylaxis: Heparin


Discussed with: Patient, nursing 


Anticipated discharge date: in 2-3 days


Anticipated discharge place: home


A total of 35 minutes was spent on the care of this complex patient more than 

50% of the time was spent in counseling and care coordination.








Objective





- Vital Signs


Vital signs: 


                                   Vital Signs











Temp  98.2 F   12/29/22 00:55


 


Pulse  66   12/29/22 09:34


 


Resp  18   12/29/22 09:34


 


BP  118/72   12/29/22 09:34


 


Pulse Ox  96   12/29/22 09:34


 


FiO2      








                                 Intake & Output











 12/28/22 12/29/22 12/29/22





 18:59 06:59 18:59


 


Weight  102.058 kg 














- Labs


CBC & Chem 7: 


                                 12/29/22 01:21





                                 12/29/22 01:21


Labs: 


                  Abnormal Lab Results - Last 24 Hours (Table)











  12/29/22 12/29/22 12/29/22 Range/Units





  01:21 01:21 01:21 


 


Hgb  8.5 L    (11.4-16.0)  gm/dL


 


Hct  29.0 L    (34.0-46.0)  %


 


MCV  73.3 L    (80.0-100.0)  fL


 


MCH  21.3 L    (25.0-35.0)  pg


 


MCHC  29.1 L    (31.0-37.0)  g/dL


 


RDW  20.8 H    (11.5-15.5)  %


 


PT   12.2 H   (9.0-12.0)  sec


 


INR   1.2 H   (<1.2)  


 


D-Dimer   2.55 H   (<0.60)  mg/L FEU


 


Sodium     (137-145)  mmol/L


 


BUN     (7-17)  mg/dL


 


Creatinine     (0.52-1.04)  mg/dL


 


Glucose     (74-99)  mg/dL


 


Calcium     (8.4-10.2)  mg/dL


 


ALT     (4-34)  U/L


 


Urine Appearance    Turbid H  (Clear)  


 


Urine Protein    1+ H  (Negative)  


 


Urine Glucose (UA)    3+ H  (Negative)  


 


Urine Blood    Moderate H  (Negative)  


 


Ur Leukocyte Esterase    Large H  (Negative)  


 


Urine RBC    115 H  (0-5)  /hpf


 


Urine WBC    56 H  (0-5)  /hpf


 


Urine WBC Clumps    Many H  (None)  /hpf


 


Ur Squamous Epith Cells    53 H  (0-4)  /hpf


 


Urine Bacteria    Occasional H  (None)  /hpf


 


Hyaline Casts    124 H  (0-2)  /lpf


 


Urine Mucus    Many H  (None)  /hpf














  12/29/22 Range/Units





  01:21 


 


Hgb   (11.4-16.0)  gm/dL


 


Hct   (34.0-46.0)  %


 


MCV   (80.0-100.0)  fL


 


MCH   (25.0-35.0)  pg


 


MCHC   (31.0-37.0)  g/dL


 


RDW   (11.5-15.5)  %


 


PT   (9.0-12.0)  sec


 


INR   (<1.2)  


 


D-Dimer   (<0.60)  mg/L FEU


 


Sodium  136 L  (137-145)  mmol/L


 


BUN  21 H  (7-17)  mg/dL


 


Creatinine  1.07 H  (0.52-1.04)  mg/dL


 


Glucose  110 H  (74-99)  mg/dL


 


Calcium  8.3 L  (8.4-10.2)  mg/dL


 


ALT  50 H  (4-34)  U/L


 


Urine Appearance   (Clear)  


 


Urine Protein   (Negative)  


 


Urine Glucose (UA)   (Negative)  


 


Urine Blood   (Negative)  


 


Ur Leukocyte Esterase   (Negative)  


 


Urine RBC   (0-5)  /hpf


 


Urine WBC   (0-5)  /hpf


 


Urine WBC Clumps   (None)  /hpf


 


Ur Squamous Epith Cells   (0-4)  /hpf


 


Urine Bacteria   (None)  /hpf


 


Hyaline Casts   (0-2)  /lpf


 


Urine Mucus   (None)  /hpf

## 2022-12-29 NOTE — XR
EXAMINATION TYPE: XR chest 2V

 

DATE OF EXAM: 12/29/2022

 

COMPARISON: 12/22/2010

 

HISTORY: Short of breath

 

TECHNIQUE: 2 views

 

FINDINGS: There is blunting right cuspid angle with infiltrate at the right lung base. Heart is enlar
ged. Left lung is fairly clear. No heart failure. Bony thorax is intact.

 

IMPRESSION: There is right lower lobe pneumonia and right pleural effusion which is increased compare
d to old exam. No heart failure. Cardiomegaly.

## 2022-12-29 NOTE — P.CNPUL
History of Present Illness


Consult date: 12/29/22


Requesting physician: Kat Benítez


Reason for consult: dyspnea, cough, COPD, hypoxemia, pneumonia, abnormal CXR/CT


Chief complaint: Shortness of breath, chest congestion, cough.


History of present illness: 





Pulmonary consult dated 12/29/2022.





67-year-old female brought into the emergency room on December 29, by EMS, for 

difficulty breathing/shortness of breath.  The patient was recently in the 

hospital, and was discharged, and came back in, for progressive and worsening 

shortness of breath, cough, chest congestion, and generally not feeling well.  

The patient was seen, evaluated, and admitted with a diagnosis of right middle 

lobe, and right lower lobe pneumonia.  She is seen in room 16 in the intensive 

care unit.  She does have COPD from previous heavy tobacco use.  She smoked for 

at least 40 years.  Currently, she is on 4 L of oxygen.  No IV fluids.  She was 

placed on cefepime and vancomycin.  We added Solu-Medrol, Pulmicort, and 

formoterol.  Her CT angiogram was negative for pulmonary embolism.  White count 

6.9, hemoglobin 8.6, hematocrit 29.9, and platelet count 265,000.  D-dimer was 

2.55.  Sodium 136, potassium 4.6, chlorides 98, CO2 30, BUN 21, and creatinine 

1.07.  The urine was suggestive of a possible urinary tract infection.  Testing 

for influenza A, and influenza B, RSV, and coronavirus, were all negative.  

Chest x-rays consistent with a small right-sided effusion, and right lower lobe 

pneumonia.  CT angiogram showed evidence of cardiomegaly, as well as right 

middle lobe and right lower lobe pneumonia, and a moderate right-sided pleural 

effusion.





Review of Systems





REVIEW OF SYSTEMS:  


CONSTITUTIONAL: Fatigue/weakness.


NEUROLOGIC: [ Negative.]


HEENT:  [ Negative.]


CARDIAC:  [Negative.]


PULMONARY: Shortness of breath, cough, chest congestion, and phlegm production.


GI:  [Negative.]


:  [Negative.]


RHEUMATOLOGIC: [ Negative.]


IMMUNOLOGIC: [ Negative.]


ENDOCRINE:  [Negative.  ] 


DERMATOLOGIC: [Negative.]








Past Medical History


Past Medical History: Atrial Fibrillation, Heart Failure, COPD


Additional Past Medical History / Comment(s): swelling in legs,


History of Any Multi-Drug Resistant Organisms: None Reported


Date of last positivie culture/infection: April 20222


MDRO Source:: C-Diff


Past Surgical History: Ablation


Additional Past Surgical History / Comment(s): Brain aneursym 3/3/2020


Past Psychological History: No Psychological Hx Reported


Smoking Status: Former smoker


Past Alcohol Use History: None Reported


Past Drug Use History: None Reported





- Past Family History


  ** Father


Family Medical History: Myocardial Infarction (MI), Rheumatoid Arthritis (RA)





Medications and Allergies


                                Home Medications











 Medication  Instructions  Recorded  Confirmed  Type


 


Albuterol Sulfate [Ventolin HFA] 2 puff INHALATION RT-Q6H PRN 12/22/20 12/29/22 

History


 


Pantoprazole Sodium [Protonix] 40 mg PO BID 12/22/20 12/29/22 History


 


busPIRone HCl [Buspar] 10 mg PO TID PRN 12/22/20 12/29/22 History


 


ALPRAZolam [Xanax] 0.5 mg PO DAILY PRN 12/22/22 12/29/22 History


 


Aspirin EC [Ecotrin Low Dose] 81 mg PO DAILY 12/22/22 12/29/22 History


 


Biotin 5 mg PO DAILY 12/22/22 12/29/22 History


 


Bumetanide [BUMEX] 2 mg PO BID 12/22/22 12/29/22 History


 


Cholecalciferol [Vitamin D3 (25 25 mcg PO DAILY 12/22/22 12/29/22 History





Mcg = 1000 Iu)]    


 


Clopidogrel [Plavix] 75 mg PO DAILY 12/22/22 12/29/22 History


 


Cyanocobalamin [Vitamin B-12] 500 mcg PO DAILY 12/22/22 12/29/22 History


 


Dapagliflozin Propanediol [Farxiga] 10 mg PO DAILY 12/22/22 12/29/22 History


 


Dicyclomine [Bentyl] 10 mg PO BID PRN 12/22/22 12/29/22 History


 


Escitalopram [Lexapro] 10 mg PO DAILY 12/22/22 12/29/22 History


 


Fluticasone/Umeclidin/Vilanter 1 puff INHALATION RT-DAILY 12/22/22 12/29/22 

History





[Trelegy Ellipta 100-62.5-25]    


 


Ipratropium-Albuterol Nebulize 3 ml INHALATION RT-Q6H PRN 12/22/22 12/29/22 

History





[Duoneb 0.5 mg-3 mg/3 ml Soln]    


 


Isosorbide Mononitrate ER [Imdur] 30 mg PO DAILY 12/22/22 12/29/22 History


 


Losartan Potassium [Cozaar] 25 mg PO DAILY 12/22/22 12/29/22 History


 


Magnesium 250 mg PO DAILY 12/22/22 12/29/22 History


 


Metoprolol Succinate [Toprol XL] 200 mg PO DAILY 12/22/22 12/29/22 History


 


Mirtazapine [Remeron] 45 mg PO HS 12/22/22 12/29/22 History


 


Potassium Chloride [Klor-Con M10] 10 meq PO BID 12/22/22 12/29/22 History


 


Rosuvastatin [Crestor] 20 mg PO DAILY 12/22/22 12/29/22 History


 


Spironolactone [Aldactone] 25 mg PO DAILY 12/22/22 12/29/22 History


 


Sucralfate [Carafate] 1 gm PO ACHS 12/22/22 12/29/22 History


 


Zinc Gluconate [Zinc] 50 mg PO DAILY 12/22/22 12/29/22 History


 


Levofloxacin [Levaquin] 750 mg PO DAILY 12/29/22 12/29/22 History


 


dilTIAZem HCL [Tiadylt ER] 300 mg PO DAILY 12/29/22 12/29/22 History








                                    Allergies











Allergy/AdvReac Type Severity Reaction Status Date / Time


 


amoxicillin [From Augmentin] Allergy  Unknown Verified 12/29/22 01:02


 


clavulanic acid Allergy  Unknown Verified 12/29/22 01:02





[From Augmentin]     


 


codeine Allergy  Unknown Verified 12/29/22 01:02


 


metformin Allergy  Nausea & Verified 12/29/22 01:02





   Vomiting  


 


Tetracyclines Allergy  Unknown Verified 12/29/22 01:02














Physical Exam


Osteopathic Statement: *.  No significant issues noted on an osteopathic 

structural exam other than those noted in the History and Physical/Consult.


Vitals: 


                                   Vital Signs











  Temp Pulse Resp BP Pulse Ox


 


 12/29/22 13:52   71  18  92/50  97


 


 12/29/22 12:31   68   


 


 12/29/22 12:19   64   


 


 12/29/22 12:00   69  18  90/45  98


 


 12/29/22 11:00   69  18  118/76  97


 


 12/29/22 10:00   73  18  120/78  97


 


 12/29/22 09:34   66  18  118/72  96


 


 12/29/22 08:00   66   


 


 12/29/22 07:49   64   


 


 12/29/22 06:10   62  22  118/68  95


 


 12/29/22 04:50   57 L  24  106/54  97


 


 12/29/22 02:01   53 L   90/61 


 


 12/29/22 01:13    26 H  


 


 12/29/22 01:02   55 L  26 H  105/58  97


 


 12/29/22 00:55  98.2 F  51 L  26 H  99/66  96








                                Intake and Output











 12/28/22 12/29/22 12/29/22





 22:59 06:59 14:59


 


Other:   


 


  Weight  102.058 kg 102.058 kg














No acute distress, oriented 3.  The patient does have some very mild shortness 

of breath, at rest, and does have a frequent wet congested cough.





HEENT examination is grossly unremarkable.  





Neck supple.  Full range of motion.  No adenopathy thyromegaly or neck vein dist

ention.





Cardiovascular examination reveals regular rhythm rate.  S1-S2 normal.  No S3 or

S4.  No discernible murmur noted.  Heart rate 71 bpm.  Heart sounds are distant.





Lungs reveal diffuse bilateral rhonchi.  Expiratory wheezes are noted.  No 

crackles.  Breath sounds are diminished at the right lung base.  





Abdomen soft bowel sounds are heard.  No masses or tenderness.





Extremities are intact.  No cyanosis clubbing or edema.





Skin is without rash or lesion.





Neurologic examination is brief but nonfocal.





Results





- Laboratory Findings


CBC and BMP: 


                                 12/29/22 11:07





                                 12/29/22 01:21


PT/INR, D-dimer











PT  12.2 sec (9.0-12.0)  H  12/29/22  01:21    


 


INR  1.2  (<1.2)  H  12/29/22  01:21    


 


D-Dimer  2.55 mg/L FEU (<0.60)  H  12/29/22  01:21    








Abnormal lab findings: 


                                  Abnormal Labs











  12/29/22 12/29/22 12/29/22





  01:21 01:21 01:21


 


RBC   


 


Hgb  8.5 L  


 


Hct  29.0 L  


 


MCV  73.3 L  


 


MCH  21.3 L  


 


MCHC  29.1 L  


 


RDW  20.8 H  


 


Absolute Nucleated RBC   


 


Immature Gran #   


 


NRBC/100 WBC Diff   


 


PT   12.2 H 


 


INR   1.2 H 


 


D-Dimer   2.55 H 


 


Sodium   


 


BUN   


 


Creatinine   


 


Glucose   


 


Calcium   


 


ALT   


 


Urine Appearance    Turbid H


 


Urine Protein    1+ H


 


Urine Glucose (UA)    3+ H


 


Urine Blood    Moderate H


 


Ur Leukocyte Esterase    Large H


 


Urine RBC    115 H


 


Urine WBC    56 H


 


Urine WBC Clumps    Many H


 


Ur Squamous Epith Cells    53 H


 


Urine Bacteria    Occasional H


 


Hyaline Casts    124 H


 


Urine Mucus    Many H














  12/29/22 12/29/22 12/29/22





  01:21 07:32 11:07


 


RBC   3.90 L 


 


Hgb   8.0 L  8.6 L


 


Hct   29.0 L  29.9 L


 


MCV   74.4 L  74.1 L


 


MCH   20.5 L  21.2 L


 


MCHC   27.6 L  28.6 L


 


RDW   23.6 H  20.8 H


 


Absolute Nucleated RBC   0.04 H 


 


Immature Gran #   0.05 H 


 


NRBC/100 WBC Diff   0.5 H 


 


PT   


 


INR   


 


D-Dimer   


 


Sodium  136 L  


 


BUN  21 H  


 


Creatinine  1.07 H  


 


Glucose  110 H  


 


Calcium  8.3 L  


 


ALT  50 H  


 


Urine Appearance   


 


Urine Protein   


 


Urine Glucose (UA)   


 


Urine Blood   


 


Ur Leukocyte Esterase   


 


Urine RBC   


 


Urine WBC   


 


Urine WBC Clumps   


 


Ur Squamous Epith Cells   


 


Urine Bacteria   


 


Hyaline Casts   


 


Urine Mucus   














- Diagnostic Findings


Chest x-ray: image reviewed


CT scan - chest: image reviewed





Assessment and Plan


Assessment: 





Acute hypoxemic respiratory failure, secondary to right middle lobe and right 

lower lobe pneumonia, and acute COPD exacerbation.





Previous history of heavy tobacco use.





History of atrial fibrillation.





History of congestive heart failure.





History of hyperlipidemia.





History of diabetes mellitus.





History of hypertension.





Obesity.





Multiple other medical problems and comorbidities.


Plan: 





Plan dated 12/29/2022.





The patient's currently on cefepime and vancomycin as antibiotics.  The 

patient's getting Solu-Medrol 60 mg every 6 hours, and breathing treatments with

albuterol and ipratropium bromide.  In addition, we added formoterol, and 

budesonide.  X-rays, labs, and medications are reviewed.  We will continue to 

follow the patient and make recommendations along the way.  A pro-calcitonin 

level is ordered.  Legionella antigen is also ordered.  Prognosis is guarded.


Time with Patient: Greater than 30

## 2022-12-29 NOTE — CT
EXAMINATION TYPE: CT angio chest

 

DATE OF EXAM: 12/29/2022

 

COMPARISON: None

 

HISTORY: SOB, R/O PE

 

CT DLP: 593.3 mGycm

Automated exposure control for dose reduction was used.

 

CONTRAST: 

Performed with IV Contrast, patient injected with 80 mL of Isovue 370.

 

There are Three-D postprocessed images. Images obtained from the thoracic inlet to the diaphragm with
 the IV contrast.

 

There is a moderate right pleural effusion. Heart is slightly enlarged. There is infiltrate and atele
ctasis at the right lung base. The left lung is fairly clear. No evidence of left pleural effusion.

 

No evidence of filling defect in the pulmonary arteries. Thoracic aorta is intact. No aneurysm or dis
section.

 

The thoracic spine is intact. No compression fracture. Sternum is intact.

 

IMPRESSION:

Cardiomegaly. Moderate pulmonary infiltrates and atelectasis in the right middle lobe and right lower
 lobe. Moderate right pleural effusion.

 

No evidence of pulmonary embolism.

## 2022-12-30 LAB
ANION GAP SERPL CALC-SCNC: 9.4 MMOL/L (ref 10–18)
BASOPHILS # BLD AUTO: 0 K/UL (ref 0–0.2)
BASOPHILS # BLD AUTO: 0.01 X 10*3/UL (ref 0–0.1)
BASOPHILS NFR BLD AUTO: 0 %
BASOPHILS NFR BLD AUTO: 0.2 %
BUN SERPL-SCNC: 16.6 MG/DL (ref 9–27)
BUN/CREAT SERPL: 16.6 RATIO (ref 12–20)
CALCIUM SPEC-MCNC: 9 MG/DL (ref 8.7–10.3)
CHLORIDE SERPL-SCNC: 98 MMOL/L (ref 96–109)
CO2 SERPL-SCNC: 28.6 MMOL/L (ref 20–27.5)
EOSINOPHIL # BLD AUTO: 0 K/UL (ref 0–0.7)
EOSINOPHIL # BLD AUTO: 0 X 10*3/UL (ref 0.04–0.35)
EOSINOPHIL NFR BLD AUTO: 0 %
EOSINOPHIL NFR BLD AUTO: 0 %
ERYTHROCYTE [DISTWIDTH] IN BLOOD BY AUTOMATED COUNT: 3.83 X 10*6/UL (ref 4.1–5.2)
ERYTHROCYTE [DISTWIDTH] IN BLOOD BY AUTOMATED COUNT: 4.03 M/UL (ref 3.8–5.4)
ERYTHROCYTE [DISTWIDTH] IN BLOOD: 20.4 % (ref 11.5–15.5)
ERYTHROCYTE [DISTWIDTH] IN BLOOD: 23.1 % (ref 11.5–14.5)
GLUCOSE SERPL-MCNC: 205 MG/DL (ref 70–110)
HCT VFR BLD AUTO: 28.6 % (ref 37.2–46.3)
HCT VFR BLD AUTO: 30.4 % (ref 34–46)
HGB BLD-MCNC: 7.7 G/DL (ref 12–15)
HGB BLD-MCNC: 8.7 GM/DL (ref 11.4–16)
IMM GRANULOCYTES BLD QL AUTO: 0.6 %
LYMPHOCYTES # SPEC AUTO: 0.3 K/UL (ref 1–4.8)
LYMPHOCYTES # SPEC AUTO: 0.48 X 10*3/UL (ref 0.9–5)
LYMPHOCYTES NFR SPEC AUTO: 5 %
LYMPHOCYTES NFR SPEC AUTO: 7.7 %
MCH RBC QN AUTO: 20.1 PG (ref 27–32)
MCH RBC QN AUTO: 21.6 PG (ref 25–35)
MCHC RBC AUTO-ENTMCNC: 26.9 G/DL (ref 32–37)
MCHC RBC AUTO-ENTMCNC: 28.7 G/DL (ref 31–37)
MCV RBC AUTO: 74.7 FL (ref 80–97)
MCV RBC AUTO: 75.4 FL (ref 80–100)
MONOCYTES # BLD AUTO: 0.15 X 10*3/UL (ref 0.2–1)
MONOCYTES # BLD AUTO: 0.2 K/UL (ref 0–1)
MONOCYTES NFR BLD AUTO: 2.4 %
MONOCYTES NFR BLD AUTO: 3 %
NEUTROPHILS # BLD AUTO: 4.5 K/UL (ref 1.3–7.7)
NEUTROPHILS # BLD AUTO: 5.56 X 10*3/UL (ref 1.8–7.7)
NEUTROPHILS NFR BLD AUTO: 89.1 %
NEUTROPHILS NFR BLD AUTO: 90 %
NRBC BLD AUTO-RTO: 0 /100 WBCS (ref 0–0)
PLATELET # BLD AUTO: 166 K/UL (ref 150–450)
PLATELET # BLD AUTO: 182 X 10*3/UL (ref 140–440)
POTASSIUM SERPL-SCNC: 4.7 MMOL/L (ref 3.5–5.5)
SODIUM SERPL-SCNC: 136 MMOL/L (ref 135–145)
WBC # BLD AUTO: 4.9 K/UL (ref 3.8–10.6)
WBC # BLD AUTO: 6.24 X 10*3/UL (ref 4.5–10)

## 2022-12-30 RX ADMIN — IPRATROPIUM BROMIDE AND ALBUTEROL SULFATE SCH ML: .5; 3 SOLUTION RESPIRATORY (INHALATION) at 16:22

## 2022-12-30 RX ADMIN — METOPROLOL SUCCINATE SCH MG: 100 TABLET, EXTENDED RELEASE ORAL at 09:22

## 2022-12-30 RX ADMIN — BUDESONIDE SCH MG: 1 SUSPENSION RESPIRATORY (INHALATION) at 08:53

## 2022-12-30 RX ADMIN — IPRATROPIUM BROMIDE AND ALBUTEROL SULFATE SCH ML: .5; 3 SOLUTION RESPIRATORY (INHALATION) at 19:25

## 2022-12-30 RX ADMIN — FUROSEMIDE SCH MG: 10 INJECTION, SOLUTION INTRAMUSCULAR; INTRAVENOUS at 09:26

## 2022-12-30 RX ADMIN — METHYLPREDNISOLONE SODIUM SUCCINATE SCH MG: 125 INJECTION, POWDER, FOR SOLUTION INTRAMUSCULAR; INTRAVENOUS at 06:44

## 2022-12-30 RX ADMIN — CEFEPIME HYDROCHLORIDE SCH MLS/HR: 1 INJECTION, POWDER, FOR SOLUTION INTRAMUSCULAR; INTRAVENOUS at 09:28

## 2022-12-30 RX ADMIN — MIRTAZAPINE SCH MG: 45 TABLET, FILM COATED ORAL at 20:56

## 2022-12-30 RX ADMIN — HEPARIN SODIUM SCH UNIT: 5000 INJECTION INTRAVENOUS; SUBCUTANEOUS at 17:06

## 2022-12-30 RX ADMIN — FORMOTEROL FUMARATE DIHYDRATE SCH MCG: 20 SOLUTION RESPIRATORY (INHALATION) at 19:25

## 2022-12-30 RX ADMIN — METHYLPREDNISOLONE SODIUM SUCCINATE SCH MG: 125 INJECTION, POWDER, FOR SOLUTION INTRAMUSCULAR; INTRAVENOUS at 00:54

## 2022-12-30 RX ADMIN — LOSARTAN POTASSIUM SCH MG: 25 TABLET, FILM COATED ORAL at 09:24

## 2022-12-30 RX ADMIN — PANTOPRAZOLE SODIUM SCH MG: 40 INJECTION, POWDER, FOR SOLUTION INTRAVENOUS at 09:22

## 2022-12-30 RX ADMIN — LEVOFLOXACIN SCH MG: 750 TABLET, FILM COATED ORAL at 12:23

## 2022-12-30 RX ADMIN — IPRATROPIUM BROMIDE AND ALBUTEROL SULFATE SCH ML: .5; 3 SOLUTION RESPIRATORY (INHALATION) at 08:53

## 2022-12-30 RX ADMIN — SODIUM FERRIC GLUCONATE COMPLEX SCH MLS/HR: 12.5 INJECTION INTRAVENOUS at 14:43

## 2022-12-30 RX ADMIN — IPRATROPIUM BROMIDE AND ALBUTEROL SULFATE SCH ML: .5; 3 SOLUTION RESPIRATORY (INHALATION) at 12:08

## 2022-12-30 RX ADMIN — HEPARIN SODIUM SCH UNIT: 5000 INJECTION INTRAVENOUS; SUBCUTANEOUS at 09:34

## 2022-12-30 RX ADMIN — PANTOPRAZOLE SODIUM SCH MG: 40 INJECTION, POWDER, FOR SOLUTION INTRAVENOUS at 20:56

## 2022-12-30 RX ADMIN — ASPIRIN 81 MG CHEWABLE TABLET SCH MG: 81 TABLET CHEWABLE at 09:21

## 2022-12-30 RX ADMIN — HEPARIN SODIUM SCH UNIT: 5000 INJECTION INTRAVENOUS; SUBCUTANEOUS at 00:54

## 2022-12-30 RX ADMIN — FORMOTEROL FUMARATE DIHYDRATE SCH MCG: 20 SOLUTION RESPIRATORY (INHALATION) at 08:53

## 2022-12-30 RX ADMIN — ESCITALOPRAM OXALATE SCH MG: 10 TABLET, FILM COATED ORAL at 09:28

## 2022-12-30 RX ADMIN — ISOSORBIDE MONONITRATE SCH MG: 30 TABLET, EXTENDED RELEASE ORAL at 09:21

## 2022-12-30 RX ADMIN — ATORVASTATIN CALCIUM SCH MG: 40 TABLET, FILM COATED ORAL at 09:21

## 2022-12-30 RX ADMIN — FUROSEMIDE SCH MG: 10 INJECTION, SOLUTION INTRAMUSCULAR; INTRAVENOUS at 20:57

## 2022-12-30 RX ADMIN — METHYLPREDNISOLONE SODIUM SUCCINATE SCH MG: 125 INJECTION, POWDER, FOR SOLUTION INTRAMUSCULAR; INTRAVENOUS at 17:06

## 2022-12-30 RX ADMIN — SPIRONOLACTONE SCH MG: 25 TABLET, FILM COATED ORAL at 09:21

## 2022-12-30 RX ADMIN — METHYLPREDNISOLONE SODIUM SUCCINATE SCH MG: 125 INJECTION, POWDER, FOR SOLUTION INTRAMUSCULAR; INTRAVENOUS at 12:23

## 2022-12-30 RX ADMIN — BUDESONIDE SCH MG: 1 SUSPENSION RESPIRATORY (INHALATION) at 19:25

## 2022-12-30 NOTE — P.PN
Subjective


Progress Note Date: 12/30/22





Patient continues to bleed with acute blood loss anemia. Hgb continues to 

decline. Recommend EGD and colonoscopy while inpatient. Also recommend regular 

diet tonight and tomorrow. Possible EGD and colonoscopy for Monday. 





Objective





- Vital Signs


Vital signs: 


                                   Vital Signs











Temp  98.0 F   12/30/22 19:44


 


Pulse  84   12/30/22 19:51


 


Resp  22   12/30/22 19:44


 


BP  112/72   12/30/22 19:44


 


Pulse Ox  92 L  12/30/22 19:44


 


FiO2      








                                 Intake & Output











 12/30/22 12/30/22 12/31/22





 06:59 18:59 06:59


 


Intake Total  500 240


 


Balance  500 240


 


Intake:   


 


  Intake, IV Titration  500 





  Amount   


 


    Vancomycin 1,500 mg In  500 





    Sodium Chloride 0.9% 500   





    ml 500 ml @ 167 mls/hr   





    IVPB Q16H [facility] Rx#:   





    [id number]   


 


  Oral   240


 


Other:   


 


  Voiding Method  Bedside Commode 


 


  # Voids 3 6 














- Labs


CBC & Chem 7: 


                                 12/30/22 06:34





                                 12/30/22 06:34


Labs: 


                  Abnormal Lab Results - Last 24 Hours (Table)











  12/30/22 12/30/22 12/30/22 Range/Units





  00:04 06:34 06:34 


 


RBC   3.83 L   (4.10-5.20)  X 10*6/uL


 


Hgb  8.7 L  7.7 L   (11.4-16.0)  gm/dL


 


Hct  30.4 L  28.6 L   (34.0-46.0)  %


 


MCV  75.4 L  74.7 L   (80.0-100.0)  fL


 


MCH  21.6 L  20.1 L   (25.0-35.0)  pg


 


MCHC  28.7 L  26.9 L   (31.0-37.0)  g/dL


 


RDW  20.4 H  23.1 H   (11.5-15.5)  %


 


Lymphocytes #  0.3 L  0.48 L   (1.0-4.8)  k/uL


 


Monocytes #   0.15 L   (0.20-1.00)  X 10*3/uL


 


Eosinophils #   0 L   (0.04-0.35)  X 10*3/uL


 


Carbon Dioxide    28.6 H  (20.0-27.5)  mmol/L


 


Anion Gap    9.40 L  (10.00-18.00)  mmol/L


 


Est GFR (CKD-EPI)NonAf    58.7 L  (60.0-200.0)   


 


Glucose    205 H  ()  mg/dL








                      Microbiology - Last 24 Hours (Table)











 12/29/22 01:21 Urine Culture - Final





 Urine,Voided 


 


 12/30/22 00:05 Sputum Culture - Preliminary





 Sputum

## 2022-12-30 NOTE — P.PN
Subjective


Progress Note Date: 12/30/22


Principal diagnosis: 





Shortness of breath, pneumonia.





Pulmonary consult dated 12/29/2022.





67-year-old female brought into the emergency room on December 29, by EMS, for 

difficulty breathing/shortness of breath.  The patient was recently in the 

hospital, and was discharged, and came back in, for progressive and worsening 

shortness of breath, cough, chest congestion, and generally not feeling well.  

The patient was seen, evaluated, and admitted with a diagnosis of right middle 

lobe, and right lower lobe pneumonia.  She is seen in room 16 in the intensive 

care unit.  She does have COPD from previous heavy tobacco use.  She smoked for 

at least 40 years.  Currently, she is on 4 L of oxygen.  No IV fluids.  She was 

placed on cefepime and vancomycin.  We added Solu-Medrol, Pulmicort, and 

formoterol.  Her CT angiogram was negative for pulmonary embolism.  White count 

6.9, hemoglobin 8.6, hematocrit 29.9, and platelet count 265,000.  D-dimer was 

2.55.  Sodium 136, potassium 4.6, chlorides 98, CO2 30, BUN 21, and creatinine 

1.07.  The urine was suggestive of a possible urinary tract infection.  Testing 

for influenza A, and influenza B, RSV, and coronavirus, were all negative.  

Chest x-rays consistent with a small right-sided effusion, and right lower lobe 

pneumonia.  CT angiogram showed evidence of cardiomegaly, as well as right 

middle lobe and right lower lobe pneumonia, and a moderate right-sided pleural 

effusion.





Progress note dated 12/30/2022.





67-year-old female seen yesterday in the emergency room, shortness of breath, 

cough, chest congestion, and possible pneumonia involving the right middle lobe 

and right lower lobe.  Currently, the patient is in bed, resting, on 4 L.  No 

respiratory distress.  She states that she does not feel any better today than 

she did yesterday.  White count 6.24, hemoglobin 7.7, hematocrit 28.6, and 

platelet count 282,000.  Sodium 136, potassium 4.7, chlorides 98, CO2 29, BUN 

16, and creatinine 1.  Pro-calcitonin level is surprisingly low at 0.06.  She 

does appear to have a urinary tract infection.  She tested negative for 

influenza A, and B, as well as RSV, and coronavirus.  She is currently on 

cefepime and Levaquin.





Objective





- Vital Signs


Vital signs: 


                                   Vital Signs











Temp  98.3 F   12/30/22 08:00


 


Pulse  92   12/30/22 12:19


 


Resp  18   12/30/22 08:00


 


BP  106/64   12/30/22 08:00


 


Pulse Ox  98   12/30/22 08:53


 


FiO2      








                                 Intake & Output











 12/29/22 12/30/22 12/30/22





 18:59 06:59 18:59


 


Intake Total 300  500


 


Balance 300  500


 


Weight 102.058 kg  


 


Intake:   


 


  Intake, IV Titration   500





  Amount   


 


    Vancomycin 1,500 mg In   500





    Sodium Chloride 0.9% 500   





    ml 500 ml @ 167 mls/hr   





    IVPB Q16H [facility] Rx#:   





    [id number]   


 


  Oral 300  


 


Other:   


 


  Voiding Method   Bedside Commode


 


  # Voids 2 3 














- Exam





No acute distress, oriented 3.  The patient does have some very mild shortness 

of breath, at rest, and does have a frequent wet congested cough.





HEENT examination is grossly unremarkable.  





Neck supple.  Full range of motion.  No adenopathy thyromegaly or neck vein 

distention.





Cardiovascular examination reveals regular rhythm rate.  S1-S2 normal.  No S3 or

S4.  No discernible murmur noted.  Heart rate 92 bpm.  Heart sounds are distant.





Lungs reveal diffuse bilateral rhonchi.  Expiratory wheezes are noted.  No 

crackles.  Breath sounds are diminished at the right lung base.  4 L saturation 

is 98%.





Abdomen soft bowel sounds are heard.  No masses or tenderness.





Extremities are intact.  No cyanosis clubbing or edema.





Skin is without rash or lesion.





Neurologic examination is brief but nonfocal.





- Labs


CBC & Chem 7: 


                                 12/30/22 06:34





                                 12/30/22 06:34


Labs: 


                  Abnormal Lab Results - Last 24 Hours (Table)











  12/29/22 12/29/22 12/30/22 Range/Units





  11:07 17:33 00:04 


 


RBC     (4.10-5.20)  X 10*6/uL


 


Hgb   8.5 L  8.7 L  (11.4-16.0)  gm/dL


 


Hct   29.2 L  30.4 L  (34.0-46.0)  %


 


MCV   75.1 L  75.4 L  (80.0-100.0)  fL


 


MCH   21.9 L  21.6 L  (25.0-35.0)  pg


 


MCHC   29.1 L  28.7 L  (31.0-37.0)  g/dL


 


RDW   20.3 H  20.4 H  (11.5-15.5)  %


 


Lymphocytes #   0.4 L  0.3 L  (1.0-4.8)  k/uL


 


Monocytes #     (0.20-1.00)  X 10*3/uL


 


Eosinophils #     (0.04-0.35)  X 10*3/uL


 


Carbon Dioxide     (20.0-27.5)  mmol/L


 


Anion Gap     (10.00-18.00)  mmol/L


 


Est GFR (CKD-EPI)NonAf     (60.0-200.0)   


 


Glucose     ()  mg/dL


 


Iron  21 L    ()  ug/dL


 


TIBC  494 H    (228-460)  ug/dL


 


% Saturation  4.19 L    (12.00-45.00)   














  12/30/22 12/30/22 Range/Units





  06:34 06:34 


 


RBC  3.83 L   (4.10-5.20)  X 10*6/uL


 


Hgb  7.7 L   (11.4-16.0)  gm/dL


 


Hct  28.6 L   (34.0-46.0)  %


 


MCV  74.7 L   (80.0-100.0)  fL


 


MCH  20.1 L   (25.0-35.0)  pg


 


MCHC  26.9 L   (31.0-37.0)  g/dL


 


RDW  23.1 H   (11.5-15.5)  %


 


Lymphocytes #  0.48 L   (1.0-4.8)  k/uL


 


Monocytes #  0.15 L   (0.20-1.00)  X 10*3/uL


 


Eosinophils #  0 L   (0.04-0.35)  X 10*3/uL


 


Carbon Dioxide   28.6 H  (20.0-27.5)  mmol/L


 


Anion Gap   9.40 L  (10.00-18.00)  mmol/L


 


Est GFR (CKD-EPI)NonAf   58.7 L  (60.0-200.0)   


 


Glucose   205 H  ()  mg/dL


 


Iron    ()  ug/dL


 


TIBC    (228-460)  ug/dL


 


% Saturation    (12.00-45.00)   








                      Microbiology - Last 24 Hours (Table)











 12/29/22 01:21 Urine Culture - Final





 Urine,Voided 


 


 12/30/22 00:05 Sputum Culture - Preliminary





 Sputum 














Assessment and Plan


Assessment: 





Acute hypoxemic respiratory failure, secondary to right middle lobe and right 

lower lobe pneumonia, and acute COPD exacerbation.





Previous history of heavy tobacco use.





Possible urinary tract infection.





History of atrial fibrillation.





History of congestive heart failure.





History of hyperlipidemia.





History of diabetes mellitus.





History of hypertension.





Obesity.





Multiple other medical problems and comorbidities.


Plan: 





Plan dated 12/29/2022.





The patient's currently on cefepime and vancomycin as antibiotics.  The 

patient's getting Solu-Medrol 60 mg every 6 hours, and breathing treatments with

albuterol and ipratropium bromide.  In addition, we added formoterol, and 

budesonide.  X-rays, labs, and medications are reviewed.  We will continue to 

follow the patient and make recommendations along the way.  A pro-calcitonin 

level is ordered.  Legionella antigen is also ordered.  Prognosis is guarded.





Plan dated 12/30/2022.





The patient is not feeling much better today than she did yesterday.  She was 

initially on cefepime and vancomycin, and the vancomycin was discontinued, and 

she was placed on Levaquin.  Her urinalysis is suspicious for urinary tract 

infection.  Saturations are 98%.  Labs, x-rays, and medications are reviewed.  

The patient continues on breathing treatments.  We will continue to follow and 

make recommendations along the way.


Time with Patient: Less than 30

## 2022-12-30 NOTE — P.PN
Subjective


Progress Note Date: 12/30/22 (delayed charting seen at 1045)


Patient is a 67-year-old female with COPD and chronic hypoxic respiratory 

failure on 4 L nasal cannula, diastolic congestive heart failure, and atrial 

fibrillation on anticoagulation who presented to the ER secondary to diffuse 

body aches and shortness of breath.  On arrival to the ER she was found to be 

cachectic With a respiratory rate of 26.  Laboratory analysis demonstrated a 

hemoglobin of 8.5 (up from 7.8 on 12/22/22).  BUN 21, creatinine 1.07.  Uri

nalysis was contaminated and patient without complaints of dysuria.  Influenza 

A/B/RSV/COVID-19 testing were negative.  Chest x-ray showed right lower lobe 

pneumonia with right pleural effusion.  She underwent CT of the chest which 

showed cardiomegaly, moderate pulmonary infiltrates and atelectasis in the right

middle and lower lobes with a moderate right pleural effusion and no evidence of

pulmonary embolism.  She was given a dose of vancomycin and cefepime in the ER 

for probable pneumonia.  Arrangements were made for admission. Pulmonary saw the

patient and agreed with antibiotics.  Patient's calcitonin then came back at 

0.06 and she was subsequently transitioned back to Levaquin as it appeared her 

outpatient antibiotics were effective and she just was awaiting resolution of ch

anges on computed tomography scan.





Of note the patient was here on 12/22 and was transferred to [facility] 

secondary to probable GI bleed.  She reports that they did not do a scope there 

but she had a scope earlier this year with an upper and lower.  They have 

recommended a pill endoscopy which she has not yet completed.  She reports she 

was diagnosed with pneumonia there and was taking an antibiotic and felt better 

for a few days and then again felt worse.  She has a pulmonologist that she was 

seen a couple of times on the [place] area and a cardiologist out of [place]. 

She is here visiting her son for the holidays.





Patient seen and examined at bedside.  Patient continues to complain of "not 

feeling well".  She denies any shortness of breath.  Continues to have some 

chest discomfort.  We discussed that she has a pleural effusion and recent 

pneumonia and she will likely have some chest discomfort for several weeks and 

feel weak.  I again reiterated that I reviewed her records from [facility] 

it is recommended that she have an outpatient scope.





General: nontoxic, no distress, appears older than stated age, obese


Derm: warm, dry


Head: atraumatic, normocephalic, symmetric


Eyes: EOMI, no lid lag, anicteric sclera


Mouth: no lip lesion, mucus membranes moist


Cardiovascular: S1S2 reg, no murmur, positive posterior tibial pulse bilateral, 


Lungs: Coarse breath sounds bilateral, no rhonchi, no rales , no accessory 

muscle use


Abdominal: soft,  nontender to palpation, no guarding, no appreciable 

organomegaly


Ext: no gross muscle atrophy, no edema, no contractures


Neuro:  CN II-XI grossly intact, no focal neuro deficits


Psych: Alert, oriented, appropriate affect 





Assessment/Plan: 





Pneumonia, failed outpatient treatment


Chronic Respiratory with hypoxia, 4L 


Acute exacerbation of COPD, mild 


-Procalcitonin is negative and vancomycin mycin and cefepime will be 

discontinued.  Patient will be transitioned back to oral Levaquin.


-Pulmonary recommendations appreciated


- sputum culture, procalitonin, legionella antigen 


- pulm hygiene


- oral steroids, bronchdilators. 





Anemia, Iron deficiency


- IV iron X 3 if still hospitalized.


- probable GI source, no active bleeding at this time


- follow CBC


- outpatient pill endoscopy recommended 


- protonix 





HTN, controlled


- continue with losartan and metorpolol


- imdur


- follow BP





Compensated diastolic CHF 


Permanent A fib, not on AC at baseline due to HgB 








DVT prophylaxis: Heparin


Discussed with: Patient, nursing 


Anticipated discharge date: in AM


Anticipated discharge place: home


A total of 35 minutes was spent on the care of this complex patient more than 

50% of the time was spent in counseling and care coordination.








Objective





- Vital Signs


Vital signs: 


                                   Vital Signs











Temp  98.0 F   12/30/22 14:00


 


Pulse  92   12/30/22 16:34


 


Resp  17   12/30/22 14:00


 


BP  103/55   12/30/22 14:00


 


Pulse Ox  94 L  12/30/22 14:00


 


FiO2      








                                 Intake & Output











 12/30/22 12/30/22 12/31/22





 06:59 18:59 06:59


 


Intake Total  500 


 


Balance  500 


 


Intake:   


 


  Intake, IV Titration  500 





  Amount   


 


    Vancomycin 1,500 mg In  500 





    Sodium Chloride 0.9% 500   





    ml 500 ml @ 167 mls/hr   





    IVPB Q16H [facility] Rx#:   





    [id number]   


 


Other:   


 


  Voiding Method  Bedside Commode 


 


  # Voids 3 6 














- Labs


CBC & Chem 7: 


                                 12/30/22 06:34





                                 12/30/22 06:34


Labs: 


                  Abnormal Lab Results - Last 24 Hours (Table)











  12/29/22 12/30/22 12/30/22 Range/Units





  11:07 00:04 06:34 


 


RBC    3.83 L  (4.10-5.20)  X 10*6/uL


 


Hgb   8.7 L  7.7 L  (11.4-16.0)  gm/dL


 


Hct   30.4 L  28.6 L  (34.0-46.0)  %


 


MCV   75.4 L  74.7 L  (80.0-100.0)  fL


 


MCH   21.6 L  20.1 L  (25.0-35.0)  pg


 


MCHC   28.7 L  26.9 L  (31.0-37.0)  g/dL


 


RDW   20.4 H  23.1 H  (11.5-15.5)  %


 


Lymphocytes #   0.3 L  0.48 L  (1.0-4.8)  k/uL


 


Monocytes #    0.15 L  (0.20-1.00)  X 10*3/uL


 


Eosinophils #    0 L  (0.04-0.35)  X 10*3/uL


 


Carbon Dioxide     (20.0-27.5)  mmol/L


 


Anion Gap     (10.00-18.00)  mmol/L


 


Est GFR (CKD-EPI)NonAf     (60.0-200.0)   


 


Glucose     ()  mg/dL


 


Iron  21 L    ()  ug/dL


 


TIBC  494 H    (228-460)  ug/dL


 


% Saturation  4.19 L    (12.00-45.00)   














  12/30/22 Range/Units





  06:34 


 


RBC   (4.10-5.20)  X 10*6/uL


 


Hgb   (11.4-16.0)  gm/dL


 


Hct   (34.0-46.0)  %


 


MCV   (80.0-100.0)  fL


 


MCH   (25.0-35.0)  pg


 


MCHC   (31.0-37.0)  g/dL


 


RDW   (11.5-15.5)  %


 


Lymphocytes #   (1.0-4.8)  k/uL


 


Monocytes #   (0.20-1.00)  X 10*3/uL


 


Eosinophils #   (0.04-0.35)  X 10*3/uL


 


Carbon Dioxide  28.6 H  (20.0-27.5)  mmol/L


 


Anion Gap  9.40 L  (10.00-18.00)  mmol/L


 


Est GFR (CKD-EPI)NonAf  58.7 L  (60.0-200.0)   


 


Glucose  205 H  ()  mg/dL


 


Iron   ()  ug/dL


 


TIBC   (228-460)  ug/dL


 


% Saturation   (12.00-45.00)   








                      Microbiology - Last 24 Hours (Table)











 12/29/22 01:21 Urine Culture - Final





 Urine,Voided 


 


 12/30/22 00:05 Sputum Culture - Preliminary





 Sputum

## 2022-12-31 LAB
ANION GAP SERPL CALC-SCNC: 9.3 MMOL/L (ref 10–18)
BUN SERPL-SCNC: 19.4 MG/DL (ref 9–27)
BUN/CREAT SERPL: 19.4 RATIO (ref 12–20)
CALCIUM SPEC-MCNC: 9 MG/DL (ref 8.7–10.3)
CHLORIDE SERPL-SCNC: 99 MMOL/L (ref 96–109)
CO2 SERPL-SCNC: 26.7 MMOL/L (ref 20–27.5)
ERYTHROCYTE [DISTWIDTH] IN BLOOD BY AUTOMATED COUNT: 3.84 X 10*6/UL (ref 4.1–5.2)
ERYTHROCYTE [DISTWIDTH] IN BLOOD BY AUTOMATED COUNT: 4.15 M/UL (ref 3.8–5.4)
ERYTHROCYTE [DISTWIDTH] IN BLOOD: 20.9 % (ref 11.5–15.5)
ERYTHROCYTE [DISTWIDTH] IN BLOOD: 23.8 % (ref 11.5–14.5)
GLUCOSE SERPL-MCNC: 207 MG/DL (ref 70–110)
HCT VFR BLD AUTO: 28.2 % (ref 37.2–46.3)
HCT VFR BLD AUTO: 31.3 % (ref 34–46)
HGB BLD-MCNC: 7.8 G/DL (ref 12–15)
HGB BLD-MCNC: 8.9 GM/DL (ref 11.4–16)
MCH RBC QN AUTO: 20.3 PG (ref 27–32)
MCH RBC QN AUTO: 21.4 PG (ref 25–35)
MCHC RBC AUTO-ENTMCNC: 27.7 G/DL (ref 32–37)
MCHC RBC AUTO-ENTMCNC: 28.3 G/DL (ref 31–37)
MCV RBC AUTO: 73.4 FL (ref 80–97)
MCV RBC AUTO: 75.4 FL (ref 80–100)
NRBC BLD AUTO-RTO: 0.4 /100 WBCS (ref 0–0)
PLATELET # BLD AUTO: 193 X 10*3/UL (ref 140–440)
PLATELET # BLD AUTO: 214 K/UL (ref 150–450)
POTASSIUM SERPL-SCNC: 4.7 MMOL/L (ref 3.5–5.5)
SODIUM SERPL-SCNC: 135 MMOL/L (ref 135–145)
WBC # BLD AUTO: 14.18 X 10*3/UL (ref 4.5–10)
WBC # BLD AUTO: 15 K/UL (ref 3.8–10.6)

## 2022-12-31 RX ADMIN — HEPARIN SODIUM SCH UNIT: 5000 INJECTION INTRAVENOUS; SUBCUTANEOUS at 16:02

## 2022-12-31 RX ADMIN — METHYLPREDNISOLONE SODIUM SUCCINATE SCH MG: 125 INJECTION, POWDER, FOR SOLUTION INTRAMUSCULAR; INTRAVENOUS at 06:16

## 2022-12-31 RX ADMIN — ASPIRIN 81 MG CHEWABLE TABLET SCH MG: 81 TABLET CHEWABLE at 09:30

## 2022-12-31 RX ADMIN — FORMOTEROL FUMARATE DIHYDRATE SCH: 20 SOLUTION RESPIRATORY (INHALATION) at 22:04

## 2022-12-31 RX ADMIN — SUCRALFATE SCH GM: 1 TABLET ORAL at 17:19

## 2022-12-31 RX ADMIN — FUROSEMIDE SCH MG: 10 INJECTION, SOLUTION INTRAMUSCULAR; INTRAVENOUS at 09:43

## 2022-12-31 RX ADMIN — FORMOTEROL FUMARATE DIHYDRATE SCH MCG: 20 SOLUTION RESPIRATORY (INHALATION) at 09:08

## 2022-12-31 RX ADMIN — METHYLPREDNISOLONE SODIUM SUCCINATE SCH MG: 125 INJECTION, POWDER, FOR SOLUTION INTRAMUSCULAR; INTRAVENOUS at 12:48

## 2022-12-31 RX ADMIN — IPRATROPIUM BROMIDE AND ALBUTEROL SULFATE SCH: .5; 3 SOLUTION RESPIRATORY (INHALATION) at 13:15

## 2022-12-31 RX ADMIN — IPRATROPIUM BROMIDE AND ALBUTEROL SULFATE SCH: .5; 3 SOLUTION RESPIRATORY (INHALATION) at 15:05

## 2022-12-31 RX ADMIN — ATORVASTATIN CALCIUM SCH MG: 40 TABLET, FILM COATED ORAL at 09:30

## 2022-12-31 RX ADMIN — DILTIAZEM HYDROCHLORIDE SCH MG: 300 CAPSULE, COATED, EXTENDED RELEASE ORAL at 10:32

## 2022-12-31 RX ADMIN — LEVOFLOXACIN SCH MG: 750 TABLET, FILM COATED ORAL at 09:30

## 2022-12-31 RX ADMIN — HEPARIN SODIUM SCH UNIT: 5000 INJECTION INTRAVENOUS; SUBCUTANEOUS at 09:30

## 2022-12-31 RX ADMIN — IPRATROPIUM BROMIDE AND ALBUTEROL SULFATE SCH ML: .5; 3 SOLUTION RESPIRATORY (INHALATION) at 09:08

## 2022-12-31 RX ADMIN — MIRTAZAPINE SCH MG: 45 TABLET, FILM COATED ORAL at 20:52

## 2022-12-31 RX ADMIN — BUDESONIDE SCH MG: 1 SUSPENSION RESPIRATORY (INHALATION) at 19:18

## 2022-12-31 RX ADMIN — METOPROLOL SUCCINATE SCH MG: 100 TABLET, EXTENDED RELEASE ORAL at 09:30

## 2022-12-31 RX ADMIN — IPRATROPIUM BROMIDE AND ALBUTEROL SULFATE SCH ML: .5; 3 SOLUTION RESPIRATORY (INHALATION) at 19:18

## 2022-12-31 RX ADMIN — ISOSORBIDE MONONITRATE SCH MG: 30 TABLET, EXTENDED RELEASE ORAL at 09:30

## 2022-12-31 RX ADMIN — SUCRALFATE SCH GM: 1 TABLET ORAL at 20:52

## 2022-12-31 RX ADMIN — PANTOPRAZOLE SODIUM SCH MG: 40 INJECTION, POWDER, FOR SOLUTION INTRAVENOUS at 09:42

## 2022-12-31 RX ADMIN — PANTOPRAZOLE SODIUM SCH MG: 40 INJECTION, POWDER, FOR SOLUTION INTRAVENOUS at 20:52

## 2022-12-31 RX ADMIN — SUCRALFATE SCH GM: 1 TABLET ORAL at 12:51

## 2022-12-31 RX ADMIN — FUROSEMIDE SCH MG: 10 INJECTION, SOLUTION INTRAMUSCULAR; INTRAVENOUS at 20:51

## 2022-12-31 RX ADMIN — HEPARIN SODIUM SCH UNIT: 5000 INJECTION INTRAVENOUS; SUBCUTANEOUS at 00:53

## 2022-12-31 RX ADMIN — LOSARTAN POTASSIUM SCH MG: 25 TABLET, FILM COATED ORAL at 09:30

## 2022-12-31 RX ADMIN — SPIRONOLACTONE SCH MG: 25 TABLET, FILM COATED ORAL at 09:30

## 2022-12-31 RX ADMIN — BUDESONIDE SCH MG: 1 SUSPENSION RESPIRATORY (INHALATION) at 09:08

## 2022-12-31 RX ADMIN — METHYLPREDNISOLONE SODIUM SUCCINATE SCH MG: 125 INJECTION, POWDER, FOR SOLUTION INTRAMUSCULAR; INTRAVENOUS at 17:37

## 2022-12-31 RX ADMIN — METHYLPREDNISOLONE SODIUM SUCCINATE SCH MG: 125 INJECTION, POWDER, FOR SOLUTION INTRAMUSCULAR; INTRAVENOUS at 00:53

## 2022-12-31 RX ADMIN — SODIUM FERRIC GLUCONATE COMPLEX SCH MLS/HR: 12.5 INJECTION INTRAVENOUS at 09:45

## 2022-12-31 RX ADMIN — ESCITALOPRAM OXALATE SCH MG: 10 TABLET, FILM COATED ORAL at 09:55

## 2022-12-31 NOTE — P.PN
Subjective


Progress Note Date: 12/31/22


Principal diagnosis: 





Shortness of breath, pneumonia.





Pulmonary consult dated 12/29/2022.





67-year-old female brought into the emergency room on December 29, by EMS, for 

difficulty breathing/shortness of breath.  The patient was recently in the 

hospital, and was discharged, and came back in, for progressive and worsening 

shortness of breath, cough, chest congestion, and generally not feeling well.  

The patient was seen, evaluated, and admitted with a diagnosis of right middle 

lobe, and right lower lobe pneumonia.  She is seen in room 16 in the intensive 

care unit.  She does have COPD from previous heavy tobacco use.  She smoked for 

at least 40 years.  Currently, she is on 4 L of oxygen.  No IV fluids.  She was 

placed on cefepime and vancomycin.  We added Solu-Medrol, Pulmicort, and 

formoterol.  Her CT angiogram was negative for pulmonary embolism.  White count 

6.9, hemoglobin 8.6, hematocrit 29.9, and platelet count 265,000.  D-dimer was 

2.55.  Sodium 136, potassium 4.6, chlorides 98, CO2 30, BUN 21, and creatinine 

1.07.  The urine was suggestive of a possible urinary tract infection.  Testing 

for influenza A, and influenza B, RSV, and coronavirus, were all negative.  

Chest x-rays consistent with a small right-sided effusion, and right lower lobe 

pneumonia.  CT angiogram showed evidence of cardiomegaly, as well as right 

middle lobe and right lower lobe pneumonia, and a moderate right-sided pleural 

effusion.





Progress note dated 12/30/2022.





67-year-old female seen yesterday in the emergency room, shortness of breath, 

cough, chest congestion, and possible pneumonia involving the right middle lobe 

and right lower lobe.  Currently, the patient is in bed, resting, on 4 L.  No 

respiratory distress.  She states that she does not feel any better today than 

she did yesterday.  White count 6.24, hemoglobin 7.7, hematocrit 28.6, and 

platelet count 282,000.  Sodium 136, potassium 4.7, chlorides 98, CO2 29, BUN 

16, and creatinine 1.  Pro-calcitonin level is surprisingly low at 0.06.  She 

does appear to have a urinary tract infection.  She tested negative for 

influenza A, and B, as well as RSV, and coronavirus.  She is currently on 

cefepime and Levaquin.





Progress note dated 12/31/2022.





67-year-old female seen 2 days ago in the emergency department.  She was 

admitted with pneumonia, and had symptoms of shortness of breath, cough, chest 

congestion.  She seen today in room 473.  She's feeling much better today.  

She's currently on 4 L of oxygen.  She's not receiving any IV fluids.  She is 

getting Levaquin.  White count 14.2, hemoglobin 7.8, hematocrit 28.2, and 

platelet count 293,000.  Sodium 135, potassium 4.7, chlorides 99, CO2 27, BUN 

19, creatinine 1.  Most recent pro-calcitonin is 0.06.





Objective





- Vital Signs


Vital signs: 


                                   Vital Signs











Temp  98.3 F   12/31/22 07:13


 


Pulse  129 H  12/31/22 10:12


 


Resp  22   12/31/22 10:12


 


BP  106/74   12/31/22 10:12


 


Pulse Ox  95   12/31/22 10:12


 


FiO2      








                                 Intake & Output











 12/30/22 12/31/22 12/31/22





 18:59 06:59 18:59


 


Intake Total 500 240 


 


Balance 500 240 


 


Intake:   


 


  Intake, IV Titration 500  





  Amount   


 


    Vancomycin 1,500 mg In 500  





    Sodium Chloride 0.9% 500   





    ml 500 ml @ 167 mls/hr   





    IVPB Q16H [facility] Rx#:   





    [id number]   


 


  Oral  240 


 


Other:   


 


  Voiding Method Bedside Commode  Bedside Commode


 


  # Voids 6 4 














- Exam





No acute distress, oriented 3.  The patient is much more awake today and alert,

and not having much in way of respiratory issues.





HEENT examination is grossly unremarkable.  





Neck supple.  Full range of motion.  No adenopathy thyromegaly or neck vein 

distention.





Cardiovascular examination reveals regular rhythm rate.  S1-S2 normal.  No S3 or

S4.  No discernible murmur noted.  Heart rate 96 bpm.  Heart sounds are distant.





Lungs reveal diffuse bilateral rhonchi.  Expiratory wheezes are noted.  No 

crackles.  Breath sounds are diminished at the right lung base.  4 L saturation 

is 96 %.





Abdomen soft bowel sounds are heard.  No masses or tenderness.





Extremities are intact.  No cyanosis clubbing or edema.





Skin is without rash or lesion.





Neurologic examination is brief but nonfocal.





- Labs


CBC & Chem 7: 


                                 12/31/22 06:19





                                 12/31/22 06:19


Labs: 


                  Abnormal Lab Results - Last 24 Hours (Table)











  12/30/22 12/31/22 12/31/22 Range/Units





  06:34 06:19 06:19 


 


WBC   14.18 H   (4.50-10.00)  X 10*3/uL


 


RBC  3.83 L  3.84 L   (4.10-5.20)  X 10*6/uL


 


Hgb  7.7 L  7.8 L   (12.0-15.0)  g/dL


 


Hct  28.6 L  28.2 L   (37.2-46.3)  %


 


MCV  74.7 L  73.4 L   (80.0-97.0)  fL


 


MCH  20.1 L  20.3 L   (27.0-32.0)  pg


 


MCHC  26.9 L  27.7 L   (32.0-37.0)  g/dL


 


RDW  23.1 H  23.8 H   (11.5-14.5)  %


 


Absolute Nucleated RBC   0.05 H   (0.00-0.00)  X 10*3/uL


 


Lymphocytes #  0.48 L    (0.90-5.00)  X 10*3/uL


 


Monocytes #  0.15 L    (0.20-1.00)  X 10*3/uL


 


Eosinophils #  0 L    (0.04-0.35)  X 10*3/uL


 


NRBC/100 WBC Diff   0.4 H   (0.0-0.0)  /100 WBCS


 


Anion Gap    9.30 L  (10.00-18.00)  mmol/L


 


Est GFR (CKD-EPI)NonAf    58.7 L  (60.0-200.0)   


 


Glucose    207 H  ()  mg/dL








                      Microbiology - Last 24 Hours (Table)











 12/30/22 00:05 Gram Stain - Preliminary





 Sputum Sputum Culture - Preliminary


 


 12/29/22 01:21 Urine Culture - Final





 Urine,Voided 














Assessment and Plan


Assessment: 





Acute hypoxemic respiratory failure, secondary to right middle lobe and right 

lower lobe pneumonia, and acute COPD exacerbation.





Previous history of heavy tobacco use.





Possible urinary tract infection.





History of atrial fibrillation.





History of congestive heart failure.





History of hyperlipidemia.





History of diabetes mellitus.





History of hypertension.





Obesity.





Multiple other medical problems and comorbidities.


Plan: 





Plan dated 12/29/2022.





The patient's currently on cefepime and vancomycin as antibiotics.  The 

patient's getting Solu-Medrol 60 mg every 6 hours, and breathing treatments with

albuterol and ipratropium bromide.  In addition, we added formoterol, and 

budesonide.  X-rays, labs, and medications are reviewed.  We will continue to 

follow the patient and make recommendations along the way.  A pro-calcitonin 

level is ordered.  Legionella antigen is also ordered.  Prognosis is guarded.





Plan dated 12/30/2022.





The patient is not feeling much better today than she did yesterday.  She was 

initially on cefepime and vancomycin, and the vancomycin was discontinued, and 

she was placed on Levaquin.  Her urinalysis is suspicious for urinary tract 

infection.  Saturations are 98%.  Labs, x-rays, and medications are reviewed.  

The patient continues on breathing treatments.  We will continue to follow and 

make recommendations along the way.





Plan dated 12/31/2022.





67-year-old female admitted with pneumonia.  The patient's currently on 4 L.  

She feeling much better.  Labs, x-rays, and medications are reviewed.  She is 

much more awake and alert today than she was yesterday.  So far microbiologic 

sampling is negative or pending.  She continues on antibiotics.  We will 

continue to follow make recommendations along the way.  Prognosis is guarded.  

She's currently on Levaquin.


Time with Patient: Less than 30

## 2022-12-31 NOTE — P.BASOAP
Subjective


Progress Note Date: 12/31/22








CHIEF COMPLAINT: GI bleed and anemia





HISTORY OF PRESENT ILLNESS: The patient is a 67-year-old female with pre-

existing history of GI bleed. Patient continues to have blood in stools. Hgb 

less than 8.0. She is tolerating regular diet. Per discussion with nurse, 

patient had run of Vtach. 





ROS: No reports of nausea and vomiting.  No fevers or chills.  Has pneumonia.





PHYSICAL EXAM: 


VITAL SIGNS: Reviewed


CONSTITUTIONAL:  Well developed and in no acute distress. 


EYES:  Conjuctivae without sclera icterus. Extraocular movements grossly intact.




HEAD, EARS, NOSE, THROAT: Moist buccal mucosa. Head is atraumatic, 

normocephalic. Hears conversational speech. No nasal drainage.


RESPIRATORY:  Labored respirations and equal bilateral excursions.


CARDIOVASCULAR:  Palpable 2+ radial pulses.  


ABDOMEN:No peritonitis


MUSCULOSKELETAL:  No gross deformity of the lower extremities noted.  No 

clubbing.  No cyanosis.


SKIN: Good skin turgor. Well perfused. 


NEUROLOGIC: Cranial nerves II through XII grossly intact.  No focal or 

lateralizing signs. 


PSYCH:  Appropriate affect.  Alert and oriented to person, place and time. 





CLINICAL LABS: Reviewed. Hgb 7.7 to 7.8.





ASSESSMENT: 


1. GI bleed


2. Anemia


3. Vtach


4. Pneumonia





PLAN: 


1.  Recommend EGD and colon while inpatient.


2.  Will proceed with scopes when medically stable. 











Objective





- Vital Signs


Vital signs: 


                                   Vital Signs











Temp  98.4 F   12/31/22 16:59


 


Pulse  98   12/31/22 19:20


 


Resp  20   12/31/22 14:08


 


BP  95/61   12/31/22 16:58


 


Pulse Ox  94 L  12/31/22 16:58


 


FiO2      








                                 Intake & Output











 12/31/22 12/31/22 01/01/23





 06:59 18:59 06:59


 


Intake Total 240 800 


 


Balance 240 800 


 


Intake:   


 


  Oral 240 800 


 


Other:   


 


  Voiding Method  Bedside Commode 


 


  # Voids 4 4 














- Labs


CBC & Chem 7: 


                                 01/01/23 06:47





                                 01/01/23 06:47


Labs: 


                  Abnormal Lab Results - Last 24 Hours (Table)











  12/31/22 12/31/22 12/31/22 Range/Units





  06:19 06:19 17:31 


 


WBC  14.18 H   15.0 H  (4.50-10.00)  X 10*3/uL


 


RBC  3.84 L    (4.10-5.20)  X 10*6/uL


 


Hgb  7.8 L   8.9 L  (12.0-15.0)  g/dL


 


Hct  28.2 L   31.3 L  (37.2-46.3)  %


 


MCV  73.4 L   75.4 L  (80.0-97.0)  fL


 


MCH  20.3 L   21.4 L  (27.0-32.0)  pg


 


MCHC  27.7 L   28.3 L  (32.0-37.0)  g/dL


 


RDW  23.8 H   20.9 H  (11.5-14.5)  %


 


Absolute Nucleated RBC  0.05 H    (0.00-0.00)  X 10*3/uL


 


NRBC/100 WBC Diff  0.4 H    (0.0-0.0)  /100 WBCS


 


Anion Gap   9.30 L   (10.00-18.00)  mmol/L


 


Est GFR (CKD-EPI)NonAf   58.7 L   (60.0-200.0)   


 


Glucose   207 H   ()  mg/dL








                      Microbiology - Last 24 Hours (Table)











 12/30/22 00:05 Gram Stain - Preliminary





 Sputum Sputum Culture - Preliminary














Assessment/Plan


Plan: 


Date: 12/29/22





Initial Weight: 





Initial BMI: 





Current Weight: 102.058 kg





Current BMI: 





Type of Surgery: 





Total Volume in Band: 





Previous Volume: 





Volume Removed: 





Volume Added: 





Band Size:

## 2022-12-31 NOTE — P.PN
Subjective


Progress Note Date: 12/31/22 (delayed charting seen at 1030)


Patient is a 67-year-old female with COPD and chronic hypoxic respiratory 

failure on 4 L nasal cannula, diastolic congestive heart failure, and atrial 

fibrillation on anticoagulation who presented to the ER secondary to diffuse 

body aches and shortness of breath.  On arrival to the ER she was found to be 

cachectic With a respiratory rate of 26.  Laboratory analysis demonstrated a 

hemoglobin of 8.5 (up from 7.8 on 12/22/22).  BUN 21, creatinine 1.07.  Uri

nalysis was contaminated and patient without complaints of dysuria.  Influenza 

A/B/RSV/COVID-19 testing were negative.  Chest x-ray showed right lower lobe 

pneumonia with right pleural effusion.  She underwent CT of the chest which 

showed cardiomegaly, moderate pulmonary infiltrates and atelectasis in the right

middle and lower lobes with a moderate right pleural effusion and no evidence of

pulmonary embolism.  She was given a dose of vancomycin and cefepime in the ER 

for probable pneumonia.  Arrangements were made for admission. Pulmonary saw the

patient and agreed with antibiotics.  Patient's calcitonin then came back at 

0.06 and she was subsequently transitioned back to Levaquin as it appeared her 

outpatient antibiotics were effective and she just was awaiting resolution of ch

anges on computed tomography scan.





Of note the patient was here on 12/22 and was transferred to [facility] 

secondary to probable GI bleed.  She reports that they did not do a scope there 

but she had a scope earlier this year with an upper and lower.  They have 

recommended a pill endoscopy which she has not yet completed.  She reports she 

was diagnosed with pneumonia there and was taking an antibiotic and felt better 

for a few days and then again felt worse.  She has a pulmonologist that she was 

seen a couple of times on the [facility] and a cardiologist out of [place]. 

She is here visiting her son for the holidays.





Patient seen and examined at bedside.  Patient is feeling better today. She did 

have an episode of A fib with RVR when ambulating to the bathroom and felt dizzy

and winded, it resolved with rest. Discussed plan for EGD and Colon on Monday. 





General: nontoxic, no distress, appears older than stated age, obese


Derm: warm, dry


Head: atraumatic, normocephalic, symmetric


Eyes: EOMI, no lid lag, anicteric sclera


Mouth: no lip lesion, mucus membranes moist


Cardiovascular: S1S2 irreg and tachycardiac, no murmur, positive posterior 

tibial pulse bilateral, 


Lungs: Coarse breath sounds bilateral, no rhonchi, no rales , no accessory 

muscle use


Abdominal: soft,  nontender to palpation, no guarding, no appreciable organomega

ly


Ext: no gross muscle atrophy, no edema, no contractures


Neuro:  CN II-XI grossly intact, no focal neuro deficits


Psych: Alert, oriented, appropriate affect 





Assessment/Plan: 





Pneumonia, failed outpatient treatment


Chronic Respiratory with hypoxia, 4L 


Acute exacerbation of COPD, mild 


-Levaquin


-Pulmonary recommendations appreciated


- sputum culture pending, procalitonin negative , legionella urine antigen not 

collected 


- pulm hygiene


- oral steroids, bronchdilators. 





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 


- due to cardizem not being reordered from home, patient will have son reverify 

med list


- resume home cardizem


- continue tele





Anemia, Iron deficiency


Possible GI bleed 


- IV iron X 3


- Sarah arias appreciated, plan is for scope on 1/2/23


- follow CBC


- outpatient pill endoscopy recommended 


- protonix 





HTN, controlled


- continue with losartan and metorpolol


- imdur


- follow BP





Compensated diastolic CHF 





DVT prophylaxis: Heparin


Discussed with: Patient, nursing 


Anticipated discharge date: after EGD/Colon


Anticipated discharge place: home


A total of 35 minutes was spent on the care of this complex patient more than 

50% of the time was spent in counseling and care coordination.





                               Active Medications











Generic Name Dose Route Start Last Admin





  Trade Name Freq  PRN Reason Stop Dose Admin


 


Acetaminophen  650 mg  12/29/22 10:06 





  Acetaminophen Tab 325 Mg Tab  PO  





  Q4HR PRN  





  Mild Pain or Fever > 100.5  


 


Hydrocodone Bitart/Acetaminophen  1 each  12/29/22 10:06  12/29/22 12:20





  Hydrocodone/Apap 5-325mg 1 Each Tab  PO   1 each





  Q6HR PRN   Administration





  Moderate to Severe Pain (4-10)  


 


Albuterol/Ipratropium  3 ml  12/29/22 05:52 





  Ipratropium-Albuterol 3 Ml Neb  INHALATION  





  RT-Q6H PRN  





  Shortness Of Breath  


 


Albuterol/Ipratropium  3 ml  12/29/22 08:00  12/31/22 19:18





  Ipratropium-Albuterol 3 Ml Neb  INHALATION   3 ml





  RT-QID ANASTASIIA   Administration


 


Alprazolam  0.5 mg  12/29/22 05:52  12/31/22 09:55





  Alprazolam 0.5 Mg Tab  PO   0.5 mg





  DAILY PRN   Administration





  Anxiety  


 


Aspirin  81 mg  12/29/22 09:00  12/31/22 09:30





  Aspirin 81 Mg  PO   81 mg





  DAILY ANASTASIIA   Administration


 


Atorvastatin Calcium  40 mg  12/29/22 09:00  12/31/22 09:30





  Atorvastatin 40 Mg Tab  PO   40 mg





  DAILY ANASTASIIA   Administration


 


Benzocaine/Menthol  1 each  12/29/22 09:50 





  Benzocaine/Menthol Lozeng 1 Each Lozenge  MUCOUS MEM  





  Q4HR PRN  





  Sore Throat  


 


Budesonide  1 mg  12/29/22 20:00  12/31/22 19:18





  Budesonide 1 Mg/2 Ml Nebu  INHALATION   1 mg





  RT-BID ANASTASIIA   Administration


 


Buspirone HCl  10 mg  12/29/22 05:52 





  Buspirone Hcl 10 Mg Tab  PO  





  TID PRN  





  Anxiety  


 


Cholecalciferol  25 mcg  01/01/23 09:00 





  Cholecalciferol 25 Mcg (1000 Iu) Tablet  PO  





  DAILY ANASTASIIA  


 


Dapagliflozin  10 mg  01/01/23 09:00 





  Dapagliflozin Propanediol 10 Mg Tablet  PO  





  DAILY ANASTASIIA  


 


Dicyclomine HCl  10 mg  12/29/22 05:52 





  Dicyclomine 10 Mg Cap  PO  





  BID PRN  





  adbom. cramping  


 


Diltiazem HCl  300 mg  12/31/22 11:00  12/31/22 10:32





  Diltiazem Cd 300 Mg Cap.Er.24h  PO   300 mg





  DAILY ANASTASIIA   Administration


 


Escitalopram Oxalate  10 mg  12/29/22 09:00  12/31/22 09:55





  Escitalopram 10 Mg Tab  PO   10 mg





  DAILY ANASTASIIA   Administration


 


Formoterol Fumarate  20 mcg  12/29/22 20:00  12/31/22 09:08





  Formoterol Fumarate 20 Mcg/2 Ml Nebu  INHALATION   20 mcg





  RT-BID ANASTASIIA   Administration


 


Furosemide  40 mg  12/29/22 09:00  12/31/22 09:43





  Furosemide 10 Mg/Ml 4 Ml Vial  IV   40 mg





  Q12HR ANASTASIIA   Administration


 


Guaifenesin  600 mg  12/29/22 21:00  12/31/22 09:30





  Guaifenesin 600 Mg Tablet.Er  PO   600 mg





  Q12HR ANASTASIIA   Administration


 


Heparin Sodium (Porcine)  5,000 unit  12/29/22 10:15  12/31/22 16:02





  Heparin Sodium,Porcine/Pf 5,000 Unit/0.5 Ml Syringe  SQ   5,000 unit





  Q8HR ANASTASIIA   Administration


 


Ferric Sodium Gluconate 125 mg  110 mls @ 100 mls/hr  12/30/22 11:00  12/31/22 

09:45





  / Sodium Chloride  IVPB   100 mls/hr





  DAILY ANASTASIIA   Administration


 


Isosorbide Mononitrate  30 mg  12/29/22 09:00  12/31/22 09:30





  Isosorbide Mononitrate Er 30 Mg Tab.Er.24h  PO   30 mg





  DAILY ANASTASIIA   Administration


 


Lactulose  20 gm  01/01/23 09:00 





  Lactulose 20 Gm/30 Ml Cup  PO  01/01/23 09:01 





  ONCE ONE  


 


Levofloxacin  750 mg  12/30/22 10:45  12/31/22 09:30





  Levofloxacin 750 Mg Tab  PO   750 mg





  DAILY ANASTASIIA   Administration





  Protocol  


 


Losartan Potassium  25 mg  12/29/22 09:00  12/31/22 09:30





  Losartan 25 Mg Tab  PO   25 mg





  DAILY ANASTASIIA   Administration


 


Magnesium Hydroxide  2,400 mg  01/01/23 09:00 





  Magnesium Hydroxide 2,400 Mg/10 Ml Cup  PO  01/01/23 09:01 





  ONCE ONE  


 


Melatonin  3 mg  12/29/22 10:06 





  Melatonin 3 Mg Tablet  PO  





  HS PRN  





  Insomnia  


 


Methylprednisolone Sodium Succinate  60 mg  12/29/22 12:00  12/31/22 17:37





  Methylprednisolone Sod Succi 125 Mg/2 Ml Vial  IV   60 mg





  Q6HR ANASTASIIA   Administration


 


Metoprolol Succinate  200 mg  12/29/22 09:00  12/31/22 09:30





  Metoprolol Succinate (Er) 100 Mg Tab.Er.24h  PO   200 mg





  DAILY ANASTASIIA   Administration


 


Mirtazapine  45 mg  12/29/22 21:00  12/30/22 20:56





  Mirtazapine 45 Mg Tablet  PO   45 mg





  HS ANASTASIIA   Administration


 


Naloxone HCl  0.2 mg  12/29/22 04:41 





  Naloxone 0.4 Mg/Ml 1 Ml Vial  IV  





  Q2M PRN  





  Opioid Reversal  


 


Ondansetron HCl  4 mg  12/29/22 10:06 





  Ondansetron 4 Mg/2 Ml Vial  IVP  





  Q6HR PRN  





  Nausea And Vomiting  


 


Pantoprazole Sodium  40 mg  12/29/22 21:00  12/31/22 09:42





  Pantoprazole 40 Mg/10 Ml Vial  IVP   40 mg





  BID ANASTASIIA   Administration


 


Polyethylene Glycol/Electrolytes  2,000 ml  01/01/23 08:00 





  Peg 3350 (420 Gm/Btl) + Lytes 4,000 Ml Bottle  PO  01/01/23 08:01 





  ONCE ONE  


 


Spironolactone  25 mg  12/29/22 09:00  12/31/22 09:30





  Spironolactone 25 Mg Tab  PO   25 mg





  DAILY ANASTASIIA   Administration


 


Sucralfate  1 gm  12/31/22 12:30  12/31/22 17:19





  Sucralfate 1 Gm Tab  PO   1 gm





  ACHS ANASTASIIA   Administration


 


Witch Hazel  1 each  12/29/22 11:45  12/31/22 09:34





  Witch Hazel 1 Each Med..Pad  TOPICAL   1 each





  BID ANASTASIIA   Administration

















Objective





- Vital Signs


Vital signs: 


                                   Vital Signs











Temp  97.9 F   12/31/22 20:00


 


Pulse  96   12/31/22 20:00


 


Resp  18   12/31/22 20:00


 


BP  123/73   12/31/22 20:00


 


Pulse Ox  94 L  12/31/22 20:00


 


FiO2      








                                 Intake & Output











 12/31/22 12/31/22 01/01/23





 06:59 18:59 06:59


 


Intake Total 240 800 


 


Balance 240 800 


 


Intake:   


 


  Oral 240 800 


 


Other:   


 


  Voiding Method  Bedside Commode 


 


  # Voids 4 4 














- Labs


CBC & Chem 7: 


                                 12/31/22 17:31





                                 12/31/22 06:19


Labs: 


                  Abnormal Lab Results - Last 24 Hours (Table)











  12/31/22 12/31/22 12/31/22 Range/Units





  06:19 06:19 17:31 


 


WBC  14.18 H   15.0 H  (4.50-10.00)  X 10*3/uL


 


RBC  3.84 L    (4.10-5.20)  X 10*6/uL


 


Hgb  7.8 L   8.9 L  (12.0-15.0)  g/dL


 


Hct  28.2 L   31.3 L  (37.2-46.3)  %


 


MCV  73.4 L   75.4 L  (80.0-97.0)  fL


 


MCH  20.3 L   21.4 L  (27.0-32.0)  pg


 


MCHC  27.7 L   28.3 L  (32.0-37.0)  g/dL


 


RDW  23.8 H   20.9 H  (11.5-14.5)  %


 


Absolute Nucleated RBC  0.05 H    (0.00-0.00)  X 10*3/uL


 


NRBC/100 WBC Diff  0.4 H    (0.0-0.0)  /100 WBCS


 


Anion Gap   9.30 L   (10.00-18.00)  mmol/L


 


Est GFR (CKD-EPI)NonAf   58.7 L   (60.0-200.0)   


 


Glucose   207 H   ()  mg/dL








                      Microbiology - Last 24 Hours (Table)











 12/30/22 00:05 Gram Stain - Preliminary





 Sputum Sputum Culture - Preliminary

## 2023-01-01 LAB
ALBUMIN SERPL-MCNC: 3.4 G/DL (ref 3.5–5)
ALBUMIN/GLOB SERPL: 1.2 {RATIO}
ALP SERPL-CCNC: 85 U/L (ref 38–126)
ALT SERPL-CCNC: 33 U/L (ref 4–34)
ANION GAP SERPL CALC-SCNC: 6 MMOL/L
AST SERPL-CCNC: 22 U/L (ref 14–36)
BUN SERPL-SCNC: 22 MG/DL (ref 7–17)
CALCIUM SPEC-MCNC: 8.5 MG/DL (ref 8.4–10.2)
CHLORIDE SERPL-SCNC: 100 MMOL/L (ref 98–107)
CO2 SERPL-SCNC: 29 MMOL/L (ref 22–30)
ERYTHROCYTE [DISTWIDTH] IN BLOOD BY AUTOMATED COUNT: 3.98 M/UL (ref 3.8–5.4)
ERYTHROCYTE [DISTWIDTH] IN BLOOD: 20.9 % (ref 11.5–15.5)
GLOBULIN SER CALC-MCNC: 2.9 G/DL
GLUCOSE BLD-MCNC: 193 MG/DL (ref 70–110)
GLUCOSE BLD-MCNC: 241 MG/DL (ref 70–110)
GLUCOSE SERPL-MCNC: 282 MG/DL (ref 74–99)
HCT VFR BLD AUTO: 29.9 % (ref 34–46)
HGB BLD-MCNC: 8.3 GM/DL (ref 11.4–16)
MAGNESIUM SPEC-SCNC: 2.1 MG/DL (ref 1.6–2.3)
MCH RBC QN AUTO: 20.9 PG (ref 25–35)
MCHC RBC AUTO-ENTMCNC: 27.8 G/DL (ref 31–37)
MCV RBC AUTO: 75.1 FL (ref 80–100)
PLATELET # BLD AUTO: 177 K/UL (ref 150–450)
POTASSIUM SERPL-SCNC: 4.5 MMOL/L (ref 3.5–5.1)
PROT SERPL-MCNC: 6.3 G/DL (ref 6.3–8.2)
SODIUM SERPL-SCNC: 135 MMOL/L (ref 137–145)
WBC # BLD AUTO: 10.1 K/UL (ref 3.8–10.6)

## 2023-01-01 RX ADMIN — BUDESONIDE SCH MG: 1 SUSPENSION RESPIRATORY (INHALATION) at 20:23

## 2023-01-01 RX ADMIN — HEPARIN SODIUM SCH UNIT: 5000 INJECTION INTRAVENOUS; SUBCUTANEOUS at 00:59

## 2023-01-01 RX ADMIN — BUDESONIDE SCH: 1 SUSPENSION RESPIRATORY (INHALATION) at 08:15

## 2023-01-01 RX ADMIN — METOPROLOL SUCCINATE SCH MG: 100 TABLET, EXTENDED RELEASE ORAL at 08:07

## 2023-01-01 RX ADMIN — HEPARIN SODIUM SCH UNIT: 5000 INJECTION INTRAVENOUS; SUBCUTANEOUS at 16:03

## 2023-01-01 RX ADMIN — SUCRALFATE SCH GM: 1 TABLET ORAL at 21:47

## 2023-01-01 RX ADMIN — FORMOTEROL FUMARATE DIHYDRATE SCH: 20 SOLUTION RESPIRATORY (INHALATION) at 08:15

## 2023-01-01 RX ADMIN — HEPARIN SODIUM SCH UNIT: 5000 INJECTION INTRAVENOUS; SUBCUTANEOUS at 08:06

## 2023-01-01 RX ADMIN — SPIRONOLACTONE SCH MG: 25 TABLET, FILM COATED ORAL at 08:06

## 2023-01-01 RX ADMIN — MIRTAZAPINE SCH MG: 45 TABLET, FILM COATED ORAL at 21:47

## 2023-01-01 RX ADMIN — Medication SCH MCG: at 08:06

## 2023-01-01 RX ADMIN — LEVOFLOXACIN SCH MG: 750 TABLET, FILM COATED ORAL at 08:07

## 2023-01-01 RX ADMIN — HEPARIN SODIUM SCH UNIT: 5000 INJECTION INTRAVENOUS; SUBCUTANEOUS at 23:49

## 2023-01-01 RX ADMIN — DILTIAZEM HYDROCHLORIDE SCH MG: 300 CAPSULE, COATED, EXTENDED RELEASE ORAL at 08:06

## 2023-01-01 RX ADMIN — METHYLPREDNISOLONE SODIUM SUCCINATE SCH MG: 125 INJECTION, POWDER, FOR SOLUTION INTRAMUSCULAR; INTRAVENOUS at 07:12

## 2023-01-01 RX ADMIN — SODIUM FERRIC GLUCONATE COMPLEX SCH MLS/HR: 12.5 INJECTION INTRAVENOUS at 09:10

## 2023-01-01 RX ADMIN — FUROSEMIDE SCH MG: 10 INJECTION, SOLUTION INTRAMUSCULAR; INTRAVENOUS at 21:47

## 2023-01-01 RX ADMIN — FORMOTEROL FUMARATE DIHYDRATE SCH MCG: 20 SOLUTION RESPIRATORY (INHALATION) at 20:23

## 2023-01-01 RX ADMIN — SUCRALFATE SCH GM: 1 TABLET ORAL at 07:12

## 2023-01-01 RX ADMIN — SUCRALFATE SCH GM: 1 TABLET ORAL at 12:23

## 2023-01-01 RX ADMIN — METHYLPREDNISOLONE SODIUM SUCCINATE SCH MG: 125 INJECTION, POWDER, FOR SOLUTION INTRAMUSCULAR; INTRAVENOUS at 00:58

## 2023-01-01 RX ADMIN — IPRATROPIUM BROMIDE AND ALBUTEROL SULFATE SCH: .5; 3 SOLUTION RESPIRATORY (INHALATION) at 08:15

## 2023-01-01 RX ADMIN — IPRATROPIUM BROMIDE AND ALBUTEROL SULFATE SCH ML: .5; 3 SOLUTION RESPIRATORY (INHALATION) at 20:23

## 2023-01-01 RX ADMIN — METHYLPREDNISOLONE SODIUM SUCCINATE SCH MG: 125 INJECTION, POWDER, FOR SOLUTION INTRAMUSCULAR; INTRAVENOUS at 18:29

## 2023-01-01 RX ADMIN — PANTOPRAZOLE SODIUM SCH MG: 40 INJECTION, POWDER, FOR SOLUTION INTRAVENOUS at 21:48

## 2023-01-01 RX ADMIN — LOSARTAN POTASSIUM SCH MG: 25 TABLET, FILM COATED ORAL at 08:07

## 2023-01-01 RX ADMIN — ESCITALOPRAM OXALATE SCH MG: 10 TABLET, FILM COATED ORAL at 08:06

## 2023-01-01 RX ADMIN — ASPIRIN 81 MG CHEWABLE TABLET SCH MG: 81 TABLET CHEWABLE at 08:06

## 2023-01-01 RX ADMIN — FUROSEMIDE SCH MG: 10 INJECTION, SOLUTION INTRAMUSCULAR; INTRAVENOUS at 09:11

## 2023-01-01 RX ADMIN — ISOSORBIDE MONONITRATE SCH MG: 30 TABLET, EXTENDED RELEASE ORAL at 08:06

## 2023-01-01 RX ADMIN — METHYLPREDNISOLONE SODIUM SUCCINATE SCH: 125 INJECTION, POWDER, FOR SOLUTION INTRAMUSCULAR; INTRAVENOUS at 15:15

## 2023-01-01 RX ADMIN — IPRATROPIUM BROMIDE AND ALBUTEROL SULFATE SCH ML: .5; 3 SOLUTION RESPIRATORY (INHALATION) at 10:44

## 2023-01-01 RX ADMIN — IPRATROPIUM BROMIDE AND ALBUTEROL SULFATE SCH ML: .5; 3 SOLUTION RESPIRATORY (INHALATION) at 15:47

## 2023-01-01 RX ADMIN — INSULIN ASPART SCH UNIT: 100 INJECTION, SOLUTION INTRAVENOUS; SUBCUTANEOUS at 17:33

## 2023-01-01 RX ADMIN — ATORVASTATIN CALCIUM SCH MG: 40 TABLET, FILM COATED ORAL at 08:06

## 2023-01-01 RX ADMIN — INSULIN ASPART SCH UNIT: 100 INJECTION, SOLUTION INTRAVENOUS; SUBCUTANEOUS at 21:46

## 2023-01-01 RX ADMIN — DAPAGLIFLOZIN SCH MG: 10 TABLET, FILM COATED ORAL at 08:06

## 2023-01-01 RX ADMIN — PANTOPRAZOLE SODIUM SCH MG: 40 INJECTION, POWDER, FOR SOLUTION INTRAVENOUS at 09:11

## 2023-01-01 RX ADMIN — METHYLPREDNISOLONE SODIUM SUCCINATE SCH MG: 125 INJECTION, POWDER, FOR SOLUTION INTRAMUSCULAR; INTRAVENOUS at 23:48

## 2023-01-01 RX ADMIN — SUCRALFATE SCH GM: 1 TABLET ORAL at 17:33

## 2023-01-01 NOTE — P.PN
Subjective


Progress Note Date: 01/01/23


Principal diagnosis: 





Shortness of breath, pneumonia.





Pulmonary consult dated 12/29/2022.





67-year-old female brought into the emergency room on December 29, by EMS, for 

difficulty breathing/shortness of breath.  The patient was recently in the 

hospital, and was discharged, and came back in, for progressive and worsening 

shortness of breath, cough, chest congestion, and generally not feeling well.  

The patient was seen, evaluated, and admitted with a diagnosis of right middle 

lobe, and right lower lobe pneumonia.  She is seen in room 16 in the intensive 

care unit.  She does have COPD from previous heavy tobacco use.  She smoked for 

at least 40 years.  Currently, she is on 4 L of oxygen.  No IV fluids.  She was 

placed on cefepime and vancomycin.  We added Solu-Medrol, Pulmicort, and 

formoterol.  Her CT angiogram was negative for pulmonary embolism.  White count 

6.9, hemoglobin 8.6, hematocrit 29.9, and platelet count 265,000.  D-dimer was 

2.55.  Sodium 136, potassium 4.6, chlorides 98, CO2 30, BUN 21, and creatinine 

1.07.  The urine was suggestive of a possible urinary tract infection.  Testing 

for influenza A, and influenza B, RSV, and coronavirus, were all negative.  

Chest x-rays consistent with a small right-sided effusion, and right lower lobe 

pneumonia.  CT angiogram showed evidence of cardiomegaly, as well as right 

middle lobe and right lower lobe pneumonia, and a moderate right-sided pleural 

effusion.





Progress note dated 12/30/2022.





67-year-old female seen yesterday in the emergency room, shortness of breath, 

cough, chest congestion, and possible pneumonia involving the right middle lobe 

and right lower lobe.  Currently, the patient is in bed, resting, on 4 L.  No 

respiratory distress.  She states that she does not feel any better today than 

she did yesterday.  White count 6.24, hemoglobin 7.7, hematocrit 28.6, and 

platelet count 282,000.  Sodium 136, potassium 4.7, chlorides 98, CO2 29, BUN 

16, and creatinine 1.  Pro-calcitonin level is surprisingly low at 0.06.  She 

does appear to have a urinary tract infection.  She tested negative for 

influenza A, and B, as well as RSV, and coronavirus.  She is currently on 

cefepime and Levaquin.





Progress note dated 12/31/2022.





67-year-old female seen 2 days ago in the emergency department.  She was 

admitted with pneumonia, and had symptoms of shortness of breath, cough, chest 

congestion.  She seen today in room 473.  She's feeling much better today.  

She's currently on 4 L of oxygen.  She's not receiving any IV fluids.  She is 

getting Levaquin.  White count 14.2, hemoglobin 7.8, hematocrit 28.2, and 

platelet count 293,000.  Sodium 135, potassium 4.7, chlorides 99, CO2 27, BUN 

19, creatinine 1.  Most recent pro-calcitonin is 0.06.





Progress note dated 01/01/2023.





67-year-old female, who was admitted with a diagnosis of pneumonia.  She remains

on oxygen at 4 L.  She is resting comfortably, and feeling better.  She is seen 

today in room 473.  White count is 10.1, hemoglobin 8.3, hematocrit 29.9, and 

platelet count was 177,000.  Sodium 135, potassium 4.5, chlorides 100, CO2 29, B

UN 22, and creatinine 0.96.  Chest x-ray today, shows improvement in her right 

basilar airspace opacities.





Objective





- Vital Signs


Vital signs: 


                                   Vital Signs











Temp  97.5 F L  01/01/23 08:00


 


Pulse  100   01/01/23 10:57


 


Resp  18   01/01/23 10:57


 


BP  116/71   01/01/23 08:00


 


Pulse Ox  94 L  01/01/23 08:15


 


FiO2      








                                 Intake & Output











 12/31/22 01/01/23 01/01/23





 18:59 06:59 18:59


 


Intake Total 800  


 


Output Total  300 


 


Balance 800 -300 


 


Intake:   


 


  Oral 800  


 


Output:   


 


  Urine  300 


 


Other:   


 


  Voiding Method Bedside Commode  


 


  # Voids 4  














- Exam





No acute distress, oriented 3.  The patient is much more awake today and alert,

and not having much in way of respiratory issues.





HEENT examination is grossly unremarkable.  





Neck supple.  Full range of motion.  No adenopathy thyromegaly or neck vein 

distention.





Cardiovascular examination reveals regular rhythm rate.  S1-S2 normal.  No S3 or

S4.  No discernible murmur noted.  Heart rate 92 bpm.  Heart sounds are distant.





Lungs reveal diffuse bilateral rhonchi.  Expiratory wheezes are noted.  No 

crackles.  Breath sounds are improved.  Breath sounds are diminished at the 

right lung base.  4 L saturation is 95 %.





Abdomen soft bowel sounds are heard.  No masses or tenderness.





Extremities are intact.  No cyanosis clubbing or edema.





Skin is without rash or lesion.





Neurologic examination is brief but nonfocal.





- Labs


CBC & Chem 7: 


                                 01/01/23 06:47





                                 01/01/23 06:47


Labs: 


                  Abnormal Lab Results - Last 24 Hours (Table)











  12/31/22 12/31/22 12/31/22 Range/Units





  06:19 06:19 17:31 


 


WBC  14.18 H   15.0 H  (4.50-10.00)  X 10*3/uL


 


RBC  3.84 L    (4.10-5.20)  X 10*6/uL


 


Hgb  7.8 L   8.9 L  (12.0-15.0)  g/dL


 


Hct  28.2 L   31.3 L  (37.2-46.3)  %


 


MCV  73.4 L   75.4 L  (80.0-97.0)  fL


 


MCH  20.3 L   21.4 L  (27.0-32.0)  pg


 


MCHC  27.7 L   28.3 L  (32.0-37.0)  g/dL


 


RDW  23.8 H   20.9 H  (11.5-14.5)  %


 


Absolute Nucleated RBC  0.05 H    (0.00-0.00)  X 10*3/uL


 


NRBC/100 WBC Diff  0.4 H    (0.0-0.0)  /100 WBCS


 


Sodium     (137-145)  mmol/L


 


Anion Gap   9.30 L   (10.00-18.00)  mmol/L


 


BUN     (7-17)  mg/dL


 


Est GFR (CKD-EPI)NonAf   58.7 L   (60.0-200.0)   


 


Glucose   207 H   ()  mg/dL


 


Albumin     (3.5-5.0)  g/dL














  01/01/23 01/01/23 Range/Units





  06:47 06:47 


 


WBC    (4.50-10.00)  X 10*3/uL


 


RBC    (4.10-5.20)  X 10*6/uL


 


Hgb  8.3 L   (12.0-15.0)  g/dL


 


Hct  29.9 L   (37.2-46.3)  %


 


MCV  75.1 L   (80.0-97.0)  fL


 


MCH  20.9 L   (27.0-32.0)  pg


 


MCHC  27.8 L   (32.0-37.0)  g/dL


 


RDW  20.9 H   (11.5-14.5)  %


 


Absolute Nucleated RBC    (0.00-0.00)  X 10*3/uL


 


NRBC/100 WBC Diff    (0.0-0.0)  /100 WBCS


 


Sodium   135 L  (137-145)  mmol/L


 


Anion Gap    (10.00-18.00)  mmol/L


 


BUN   22 H  (7-17)  mg/dL


 


Est GFR (CKD-EPI)NonAf    (60.0-200.0)   


 


Glucose   282 H  ()  mg/dL


 


Albumin   3.4 L  (3.5-5.0)  g/dL








                      Microbiology - Last 24 Hours (Table)











 12/30/22 00:05 Gram Stain - Final





 Sputum Sputum Culture - Final





    Candida albicans














Assessment and Plan


Assessment: 





Acute hypoxemic respiratory failure, secondary to right middle lobe and right 

lower lobe pneumonia, and acute COPD exacerbation.





Previous history of heavy tobacco use.





Possible urinary tract infection.





History of atrial fibrillation.





History of congestive heart failure.





History of hyperlipidemia.





History of diabetes mellitus.





History of hypertension.





Obesity.





Multiple other medical problems and comorbidities.


Plan: 





Plan dated 12/29/2022.





The patient's currently on cefepime and vancomycin as antibiotics.  The 

patient's getting Solu-Medrol 60 mg every 6 hours, and breathing treatments with

albuterol and ipratropium bromide.  In addition, we added formoterol, and budes

onide.  X-rays, labs, and medications are reviewed.  We will continue to follow 

the patient and make recommendations along the way.  A pro-calcitonin level is 

ordered.  Legionella antigen is also ordered.  Prognosis is guarded.





Plan dated 12/30/2022.





The patient is not feeling much better today than she did yesterday.  She was 

initially on cefepime and vancomycin, and the vancomycin was discontinued, and 

she was placed on Levaquin.  Her urinalysis is suspicious for urinary tract 

infection.  Saturations are 98%.  Labs, x-rays, and medications are reviewed.  

The patient continues on breathing treatments.  We will continue to follow and 

make recommendations along the way.





Plan dated 12/31/2022.





67-year-old female admitted with pneumonia.  The patient's currently on 4 L.  

She feeling much better.  Labs, x-rays, and medications are reviewed.  She is 

much more awake and alert today than she was yesterday.  So far microbiologic 

sampling is negative or pending.  She continues on antibiotics.  We will 

continue to follow make recommendations along the way.  Prognosis is guarded.  

She's currently on Levaquin.





Plan dated 01/01/2023.





The patient appears to be doing much better.  She remains on 4 L.  Her 

saturations are in the mid 90s.  Her chest x-rays improved.  Labs, x-rays, and 

medications are all reviewed.  She remains on Levaquin for her antibiotic.  In 

addition, she is getting Solu-Medrol, and updrafts.  We will continue to follow 

make recommendations along the way.  Prognosis is certainly guarded.


Time with Patient: Less than 30

## 2023-01-01 NOTE — XR
EXAMINATION TYPE: XR chest 2V

 

DATE OF EXAM: 1/1/2023 10:32 AM

 

COMPARISON: Chest radiographs from 12/29/2022

 

TECHNIQUE: XR chest 2V Frontal and lateral views of the chest.

 

CLINICAL INDICATION:Female, 67 years old with history of F/U pneumonia; 

 

FINDINGS: 

Lungs/Pleura: Similar airspace opacities. No evidence of pneumothorax or pleural effusion. 

Pulmonary vascularity: Unremarkable.

Heart/mediastinum: Cardiomediastinal silhouette is enlarged and stable.

Musculoskeletal: No acute osseous pathology.

 

IMPRESSION: 

Similar to mildly improved right basilar airspace opacities and small right pleural effusion.

## 2023-01-01 NOTE — P.PN
Subjective


Progress Note Date: 01/01/23














CHIEF COMPLAINT: GI bleed and anemia





HISTORY OF PRESENT ILLNESS: The patient is a 67-year-old female with pre-

existing history of GI bleed. Her Hgb has improved. "I am still concerned for 

bleeding," she reports. No abdominal pain. 





ROS: No reports of nausea and vomiting.  No fevers or chills.  Has pneumonia.





PHYSICAL EXAM: 


VITAL SIGNS: Reviewed


CONSTITUTIONAL:  Well developed and in no acute distress. 


EYES:  Conjuctivae without sclera icterus. Extraocular movements grossly intact.




HEAD, EARS, NOSE, THROAT: Moist buccal mucosa. Head is atraumatic, 

normocephalic. Hears conversational speech. No nasal drainage.


RESPIRATORY:  Labored respirations and equal bilateral excursions.


CARDIOVASCULAR:  Palpable 2+ radial pulses.  


ABDOMEN:No peritonitis


MUSCULOSKELETAL:  No gross deformity of the lower extremities noted.  No 

clubbing.  No cyanosis.


SKIN: Good skin turgor. Well perfused. 


NEUROLOGIC: Cranial nerves II through XII grossly intact.  No focal or 

lateralizing signs. 


PSYCH:  Appropriate affect.  Alert and oriented to person, place and time. 





CLINICAL LABS: Reviewed. Hgb 7.7 to 7.8, now 8.9 to 8.3





ASSESSMENT: 


1. GI bleed


2. Anemia


3. Vtach


4. Pneumonia





PLAN: 


1.  Patient's Hgb did improve; however she reports intermittent episodes of 

bleeding. Recommend EGD and colon.


2.  Bowel prep combination of Milk of magnesia, lactulose and Nulytely reviewed.


3.  Patient agreeable to proceed with upper and lower endoscopy for persistent 

anemia and bleeding. 





Objective





- Vital Signs


Vital signs: 


                                   Vital Signs











Temp  97.6 F   01/01/23 19:54


 


Pulse  82   01/01/23 20:43


 


Resp  17   01/01/23 19:54


 


BP  107/68   01/01/23 19:54


 


Pulse Ox  96   01/01/23 19:54


 


FiO2      








                                 Intake & Output











 01/01/23 01/01/23 01/02/23





 06:59 18:59 06:59


 


Output Total 300  


 


Balance -300  


 


Output:   


 


  Urine 300  


 


Other:   


 


  # Voids  4 














- Labs


CBC & Chem 7: 


                                 01/01/23 06:47





                                 01/01/23 06:47


Labs: 


                  Abnormal Lab Results - Last 24 Hours (Table)











  01/01/23 01/01/23 01/01/23 Range/Units





  06:47 06:47 06:47 


 


Hgb  8.3 L    (11.4-16.0)  gm/dL


 


Hct  29.9 L    (34.0-46.0)  %


 


MCV  75.1 L    (80.0-100.0)  fL


 


MCH  20.9 L    (25.0-35.0)  pg


 


MCHC  27.8 L    (31.0-37.0)  g/dL


 


RDW  20.9 H    (11.5-15.5)  %


 


Sodium   135 L   (137-145)  mmol/L


 


BUN   22 H   (7-17)  mg/dL


 


Glucose   282 H   (74-99)  mg/dL


 


POC Glucose (mg/dL)     ()  mg/dL


 


Hemoglobin A1c    6.3 H  (0.0-6.0)  %


 


Albumin   3.4 L   (3.5-5.0)  g/dL














  01/01/23 Range/Units





  17:24 


 


Hgb   (11.4-16.0)  gm/dL


 


Hct   (34.0-46.0)  %


 


MCV   (80.0-100.0)  fL


 


MCH   (25.0-35.0)  pg


 


MCHC   (31.0-37.0)  g/dL


 


RDW   (11.5-15.5)  %


 


Sodium   (137-145)  mmol/L


 


BUN   (7-17)  mg/dL


 


Glucose   (74-99)  mg/dL


 


POC Glucose (mg/dL)  193 H  ()  mg/dL


 


Hemoglobin A1c   (0.0-6.0)  %


 


Albumin   (3.5-5.0)  g/dL








                      Microbiology - Last 24 Hours (Table)











 12/30/22 00:05 Gram Stain - Final





 Sputum Sputum Culture - Final





    Candida albicans

## 2023-01-01 NOTE — P.PN
Subjective


Progress Note Date: 01/01/23 (delayed charting seen at 1045)


Patient is a 67-year-old female with COPD and chronic hypoxic respiratory 

failure on 4 L nasal cannula, diastolic congestive heart failure, and atrial 

fibrillation on anticoagulation who presented to the ER secondary to diffuse 

body aches and shortness of breath.  On arrival to the ER she was found to be 

cachectic With a respiratory rate of 26.  Laboratory analysis demonstrated a 

hemoglobin of 8.5 (up from 7.8 on 12/22/22).  BUN 21, creatinine 1.07.  Uri

nalysis was contaminated and patient without complaints of dysuria.  Influenza 

A/B/RSV/COVID-19 testing were negative.  Chest x-ray showed right lower lobe 

pneumonia with right pleural effusion.  She underwent CT of the chest which 

showed cardiomegaly, moderate pulmonary infiltrates and atelectasis in the right

middle and lower lobes with a moderate right pleural effusion and no evidence of

pulmonary embolism.  She was given a dose of vancomycin and cefepime in the ER 

for probable pneumonia.  Arrangements were made for admission. Pulmonary saw the

patient and agreed with antibiotics.  Patient's calcitonin then came back at 

0.06 and she was subsequently transitioned back to Levaquin as it appeared her 

outpatient antibiotics were effective and she just was awaiting resolution of ch

anges on computed tomography scan. She did have a drop in Hgb and plan is for 

EGD and Colon on 1/2/22. 





Of note the patient was here on 12/22 and was transferred to [facility] to probable GI bleed.  She reports that they did not do a scope there 

but she had a scope earlier this year with an upper and lower.  They have 

recommended a pill endoscopy which she has not yet completed.  She reports she 

was diagnosed with pneumonia there and was taking an antibiotic and felt better 

for a few days and then again felt worse.  She has a pulmonologist that she was 

seen a couple of times on the [place] area and a cardiologist out of [place]. 

She is here visiting her son for the holidays.





Patient seen and examined at bedside. Doing better today, no chest pain, no rosalind

rtness of breath, just started prep. Lost IV last night 





General: nontoxic, no distress, appears older than stated age, obese


Derm: warm, dry


Head: atraumatic, normocephalic, symmetric


Eyes: EOMI, no lid lag, anicteric sclera


Mouth: no lip lesion, mucus membranes moist


Cardiovascular: S1S2 irreg and tachycardiac, no murmur, positive posterior 

tibial pulse bilateral, 


Lungs: Coarse breath sounds bilateral, no rhonchi, no rales , no accessory 

muscle use


Abdominal: soft,  nontender to palpation, no guarding, no appreciable 

organomegaly


Ext: no gross muscle atrophy, no edema, no contractures


Neuro:  CN II-XI grossly intact, no focal neuro deficits


Psych: Alert, oriented, appropriate affect 





Assessment/Plan: 





Pneumonia, failed outpatient treatment


Chronic Respiratory with hypoxia, 4L 


Acute exacerbation of COPD, mild 


-Levaquin


-Pulmonary recommendations appreciated


- sputum culture pending, procalitonin negative , legionella urine antigen not 

collected 


- pulm hygiene


- oral steroids, bronchdilators. 





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 


- cardizem


- continue tele





Anemia, Iron deficiency


Possible GI bleed 


- s/p IV iron X 3


- Sugery recs appreciated, plan is for EGD and Colon on 1/2/23


- follow CBC


- outpatient pill endoscopy recommended 


- protonix 





HTN, controlled


- continue with losartan and metorpolol


- imdur


- follow BP





Compensated diastolic CHF 





DVT prophylaxis: Heparin


Discussed with: Patient, nursing 


Anticipated discharge date: after EGD/Colon


Anticipated discharge place: home


A total of 35 minutes was spent on the care of this complex patient more than 

50% of the time was spent in counseling and care coordination.





                               Active Medications











Generic Name Dose Route Start Last Admin





  Trade Name Freq  PRN Reason Stop Dose Admin


 


Acetaminophen  650 mg  12/29/22 10:06  01/01/23 15:30





  Acetaminophen Tab 325 Mg Tab  PO   650 mg





  Q4HR PRN   Administration





  Mild Pain or Fever > 100.5  


 


Hydrocodone Bitart/Acetaminophen  1 each  12/29/22 10:06  12/29/22 12:20





  Hydrocodone/Apap 5-325mg 1 Each Tab  PO   1 each





  Q6HR PRN   Administration





  Moderate to Severe Pain (4-10)  


 


Albuterol/Ipratropium  3 ml  12/29/22 05:52 





  Ipratropium-Albuterol 3 Ml Neb  INHALATION  





  RT-Q6H PRN  





  Shortness Of Breath  


 


Albuterol/Ipratropium  3 ml  12/29/22 08:00  01/01/23 15:47





  Ipratropium-Albuterol 3 Ml Neb  INHALATION   3 ml





  RT-QID ANASTASIIA   Administration


 


Alprazolam  0.5 mg  12/29/22 05:52  01/01/23 02:50





  Alprazolam 0.5 Mg Tab  PO   0.5 mg





  DAILY PRN   Administration





  Anxiety  


 


Aspirin  81 mg  12/29/22 09:00  01/01/23 08:06





  Aspirin 81 Mg  PO   81 mg





  DAILY ANASTASIIA   Administration


 


Atorvastatin Calcium  40 mg  12/29/22 09:00  01/01/23 08:06





  Atorvastatin 40 Mg Tab  PO   40 mg





  DAILY ANASTASIIA   Administration


 


Benzocaine/Menthol  1 each  12/29/22 09:50 





  Benzocaine/Menthol Lozeng 1 Each Lozenge  MUCOUS MEM  





  Q4HR PRN  





  Sore Throat  


 


Budesonide  1 mg  12/29/22 20:00  01/01/23 08:15





  Budesonide 1 Mg/2 Ml Nebu  INHALATION   Not Given





  RT-BID ANASTASIIA  


 


Buspirone HCl  10 mg  12/29/22 05:52 





  Buspirone Hcl 10 Mg Tab  PO  





  TID PRN  





  Anxiety  


 


Cholecalciferol  25 mcg  01/01/23 09:00  01/01/23 08:06





  Cholecalciferol 25 Mcg (1000 Iu) Tablet  PO   25 mcg





  DAILY ANASTASIIA   Administration


 


Dapagliflozin  10 mg  01/01/23 09:00  01/01/23 08:06





  Dapagliflozin Propanediol 10 Mg Tablet  PO   10 mg





  DAILY ANASTASIIA   Administration


 


Dextrose/Water  25 ml  01/01/23 12:31 





  Dextrose 50% Syringe 50 Ml  IVP  





  PER PROTOCOL PRN  





  Hypoglycemia  





  Protocol  


 


Dextrose/Water  50 ml  01/01/23 12:31 





  Dextrose 50% Syringe 50 Ml  IVP  





  PER PROTOCOL PRN  





  Hypoglycemia  





  Protocol  


 


Dicyclomine HCl  10 mg  12/29/22 05:52 





  Dicyclomine 10 Mg Cap  PO  





  BID PRN  





  adbom. cramping  


 


Diltiazem HCl  300 mg  12/31/22 11:00  01/01/23 08:06





  Diltiazem Cd 300 Mg Cap.Er.24h  PO   300 mg





  DAILY ANASTASIIA   Administration


 


Escitalopram Oxalate  10 mg  12/29/22 09:00  01/01/23 08:06





  Escitalopram 10 Mg Tab  PO   10 mg





  DAILY ANASTASIIA   Administration


 


Formoterol Fumarate  20 mcg  12/29/22 20:00  01/01/23 08:15





  Formoterol Fumarate 20 Mcg/2 Ml Nebu  INHALATION   Not Given





  RT-BID ANASTASIIA  


 


Furosemide  40 mg  12/29/22 09:00  01/01/23 09:11





  Furosemide 10 Mg/Ml 4 Ml Vial  IV   40 mg





  Q12HR ANASTASIIA   Administration


 


Guaifenesin  600 mg  12/29/22 21:00  01/01/23 08:07





  Guaifenesin 600 Mg Tablet.Er  PO   600 mg





  Q12HR ANASTASIIA   Administration


 


Heparin Sodium (Porcine)  5,000 unit  12/29/22 10:15  01/01/23 16:03





  Heparin Sodium,Porcine/Pf 5,000 Unit/0.5 Ml Syringe  SQ   5,000 unit





  Q8HR ANASTASIIA   Administration


 


Ferric Sodium Gluconate 125 mg  110 mls @ 100 mls/hr  12/30/22 11:00  01/01/23 

09:10





  / Sodium Chloride  IVPB   100 mls/hr





  DAILY ANASTASIIA   Administration


 


Insulin Aspart  0 unit  01/01/23 17:30 





  Insulin Aspart (Novolog) 100 Unit/Ml Vial  SQ  





  [facility]  





  Protocol  


 


Insulin Detemir  15 unit  01/01/23 21:00 





  Insulin Detemir (Levemir) 100 Unit/Ml Syr  0.15 unit/kg (15 unit)  





  SQ  





  HS ANASTASIIA  


 


Isosorbide Mononitrate  30 mg  12/29/22 09:00  01/01/23 08:06





  Isosorbide Mononitrate Er 30 Mg Tab.Er.24h  PO   30 mg





  DAILY ANASTASIIA   Administration


 


Levofloxacin  750 mg  12/30/22 10:45  01/01/23 08:07





  Levofloxacin 750 Mg Tab  PO   750 mg





  DAILY ANASTASIIA   Administration





  Protocol  


 


Losartan Potassium  25 mg  12/29/22 09:00  01/01/23 08:07





  Losartan 25 Mg Tab  PO   25 mg





  DAILY ANASTASIIA   Administration


 


Melatonin  3 mg  12/29/22 10:06 





  Melatonin 3 Mg Tablet  PO  





  HS PRN  





  Insomnia  


 


Methylprednisolone Sodium Succinate  60 mg  12/29/22 12:00  01/01/23 15:15





  Methylprednisolone Sod Succi 125 Mg/2 Ml Vial  IV   Not Given





  Q6HR ANASTASIIA  


 


Metoprolol Succinate  200 mg  12/29/22 09:00  01/01/23 08:07





  Metoprolol Succinate (Er) 100 Mg Tab.Er.24h  PO   200 mg





  DAILY ANASTASIIA   Administration


 


Mirtazapine  45 mg  12/29/22 21:00  12/31/22 20:52





  Mirtazapine 45 Mg Tablet  PO   45 mg





  HS ANASTASIIA   Administration


 


Naloxone HCl  0.2 mg  12/29/22 04:41 





  Naloxone 0.4 Mg/Ml 1 Ml Vial  IV  





  Q2M PRN  





  Opioid Reversal  


 


Ondansetron HCl  4 mg  12/29/22 10:06 





  Ondansetron 4 Mg/2 Ml Vial  IVP  





  Q6HR PRN  





  Nausea And Vomiting  


 


Pantoprazole Sodium  40 mg  12/29/22 21:00  01/01/23 09:11





  Pantoprazole 40 Mg/10 Ml Vial  IVP   40 mg





  BID ANASTASIIA   Administration


 


Spironolactone  25 mg  12/29/22 09:00  01/01/23 08:06





  Spironolactone 25 Mg Tab  PO   25 mg





  DAILY ANASTASIIA   Administration


 


Sucralfate  1 gm  12/31/22 12:30  01/01/23 12:23





  Sucralfate 1 Gm Tab  PO   1 gm





  [facility] ANASTASIIA   Administration


 


Witch Hazel  1 each  12/29/22 11:45  01/01/23 12:24





  Witch Hazel 1 Each Med..Pad  TOPICAL   1 each





  BID ANASTASIIA   Administration




















Objective





- Vital Signs


Vital signs: 


                                   Vital Signs











Temp  97.4 F L  01/01/23 14:00


 


Pulse  84   01/01/23 15:57


 


Resp  18   01/01/23 15:57


 


BP  95/54   01/01/23 14:00


 


Pulse Ox  96   01/01/23 14:00


 


FiO2      








                                 Intake & Output











 12/31/22 01/01/23 01/01/23





 18:59 06:59 18:59


 


Intake Total 800  


 


Output Total  300 


 


Balance 800 -300 


 


Intake:   


 


  Oral 800  


 


Output:   


 


  Urine  300 


 


Other:   


 


  Voiding Method Bedside Commode  


 


  # Voids 4  














- Labs


CBC & Chem 7: 


                                 01/01/23 06:47





                                 01/01/23 06:47


Labs: 


                  Abnormal Lab Results - Last 24 Hours (Table)











  12/31/22 01/01/23 01/01/23 Range/Units





  17:31 06:47 06:47 


 


WBC  15.0 H    (3.8-10.6)  k/uL


 


Hgb  8.9 L  8.3 L   (11.4-16.0)  gm/dL


 


Hct  31.3 L  29.9 L   (34.0-46.0)  %


 


MCV  75.4 L  75.1 L   (80.0-100.0)  fL


 


MCH  21.4 L  20.9 L   (25.0-35.0)  pg


 


MCHC  28.3 L  27.8 L   (31.0-37.0)  g/dL


 


RDW  20.9 H  20.9 H   (11.5-15.5)  %


 


Sodium    135 L  (137-145)  mmol/L


 


BUN    22 H  (7-17)  mg/dL


 


Glucose    282 H  (74-99)  mg/dL


 


Hemoglobin A1c     (0.0-6.0)  %


 


Albumin    3.4 L  (3.5-5.0)  g/dL














  01/01/23 Range/Units





  06:47 


 


WBC   (3.8-10.6)  k/uL


 


Hgb   (11.4-16.0)  gm/dL


 


Hct   (34.0-46.0)  %


 


MCV   (80.0-100.0)  fL


 


MCH   (25.0-35.0)  pg


 


MCHC   (31.0-37.0)  g/dL


 


RDW   (11.5-15.5)  %


 


Sodium   (137-145)  mmol/L


 


BUN   (7-17)  mg/dL


 


Glucose   (74-99)  mg/dL


 


Hemoglobin A1c  6.3 H  (0.0-6.0)  %


 


Albumin   (3.5-5.0)  g/dL








                      Microbiology - Last 24 Hours (Table)











 12/30/22 00:05 Gram Stain - Final





 Sputum Sputum Culture - Final





    Candida albicans

## 2023-01-02 VITALS — DIASTOLIC BLOOD PRESSURE: 58 MMHG | HEART RATE: 88 BPM | RESPIRATION RATE: 18 BRPM | SYSTOLIC BLOOD PRESSURE: 108 MMHG

## 2023-01-02 VITALS — TEMPERATURE: 97.7 F

## 2023-01-02 LAB
GLUCOSE BLD-MCNC: 181 MG/DL (ref 70–110)
GLUCOSE BLD-MCNC: 186 MG/DL (ref 70–110)

## 2023-01-02 PROCEDURE — 0DJ08ZZ INSPECTION OF UPPER INTESTINAL TRACT, VIA NATURAL OR ARTIFICIAL OPENING ENDOSCOPIC: ICD-10-PCS

## 2023-01-02 PROCEDURE — 0DJD8ZZ INSPECTION OF LOWER INTESTINAL TRACT, VIA NATURAL OR ARTIFICIAL OPENING ENDOSCOPIC: ICD-10-PCS

## 2023-01-02 RX ADMIN — Medication SCH MCG: at 12:53

## 2023-01-02 RX ADMIN — IPRATROPIUM BROMIDE AND ALBUTEROL SULFATE SCH ML: .5; 3 SOLUTION RESPIRATORY (INHALATION) at 12:24

## 2023-01-02 RX ADMIN — ESCITALOPRAM OXALATE SCH MG: 10 TABLET, FILM COATED ORAL at 12:53

## 2023-01-02 RX ADMIN — SUCRALFATE SCH GM: 1 TABLET ORAL at 13:04

## 2023-01-02 RX ADMIN — METHYLPREDNISOLONE SODIUM SUCCINATE SCH MG: 125 INJECTION, POWDER, FOR SOLUTION INTRAMUSCULAR; INTRAVENOUS at 05:33

## 2023-01-02 RX ADMIN — METHYLPREDNISOLONE SODIUM SUCCINATE SCH MG: 125 INJECTION, POWDER, FOR SOLUTION INTRAMUSCULAR; INTRAVENOUS at 12:54

## 2023-01-02 RX ADMIN — DAPAGLIFLOZIN SCH MG: 10 TABLET, FILM COATED ORAL at 12:53

## 2023-01-02 RX ADMIN — FORMOTEROL FUMARATE DIHYDRATE SCH MCG: 20 SOLUTION RESPIRATORY (INHALATION) at 08:57

## 2023-01-02 RX ADMIN — INSULIN ASPART SCH: 100 INJECTION, SOLUTION INTRAVENOUS; SUBCUTANEOUS at 06:41

## 2023-01-02 RX ADMIN — DILTIAZEM HYDROCHLORIDE SCH MG: 300 CAPSULE, COATED, EXTENDED RELEASE ORAL at 08:00

## 2023-01-02 RX ADMIN — SPIRONOLACTONE SCH MG: 25 TABLET, FILM COATED ORAL at 12:54

## 2023-01-02 RX ADMIN — SUCRALFATE SCH: 1 TABLET ORAL at 07:55

## 2023-01-02 RX ADMIN — BUDESONIDE SCH MG: 1 SUSPENSION RESPIRATORY (INHALATION) at 08:57

## 2023-01-02 RX ADMIN — LEVOFLOXACIN SCH MG: 750 TABLET, FILM COATED ORAL at 12:54

## 2023-01-02 RX ADMIN — PANTOPRAZOLE SODIUM SCH MG: 40 INJECTION, POWDER, FOR SOLUTION INTRAVENOUS at 12:54

## 2023-01-02 RX ADMIN — LOSARTAN POTASSIUM SCH MG: 25 TABLET, FILM COATED ORAL at 12:53

## 2023-01-02 RX ADMIN — METOPROLOL SUCCINATE SCH MG: 100 TABLET, EXTENDED RELEASE ORAL at 08:00

## 2023-01-02 RX ADMIN — ATORVASTATIN CALCIUM SCH MG: 40 TABLET, FILM COATED ORAL at 12:53

## 2023-01-02 RX ADMIN — IPRATROPIUM BROMIDE AND ALBUTEROL SULFATE SCH ML: .5; 3 SOLUTION RESPIRATORY (INHALATION) at 08:57

## 2023-01-02 RX ADMIN — HEPARIN SODIUM SCH: 5000 INJECTION INTRAVENOUS; SUBCUTANEOUS at 07:55

## 2023-01-02 RX ADMIN — FUROSEMIDE SCH MG: 10 INJECTION, SOLUTION INTRAMUSCULAR; INTRAVENOUS at 12:54

## 2023-01-02 RX ADMIN — ISOSORBIDE MONONITRATE SCH MG: 30 TABLET, EXTENDED RELEASE ORAL at 08:00

## 2023-01-02 RX ADMIN — INSULIN ASPART SCH UNIT: 100 INJECTION, SOLUTION INTRAVENOUS; SUBCUTANEOUS at 12:54

## 2023-01-02 RX ADMIN — ASPIRIN 81 MG CHEWABLE TABLET SCH MG: 81 TABLET CHEWABLE at 12:53

## 2023-01-02 NOTE — P.DS
Providers


Date of admission: 


12/29/22 04:41





Expected date of discharge: 01/02/23


Attending physician: 


Reji Galindo MD





Consults: 





                                        





12/29/22 06:06


Consult Physician Routine 


   Consulting Provider: Amy Stubbs


   Consult Reason/Comments: GI bleed


   Do you want consulting provider notified?: Yes





12/29/22 09:51


Consult Physician Routine 


   Consulting Provider: Akil King


   Consult Reason/Comments: PNeumonia


   Do you want consulting provider notified?: Yes











Primary care physician: 


Stated None





Hospital Course: 





Patient is a 67-year-old female with COPD and chronic hypoxic respiratory 

failure on 4 L nasal cannula, diastolic congestive heart failure, and atrial 

fibrillation on anticoagulation who presented to the ER secondary to diffuse 

body aches and shortness of breath.  On arrival to the ER she was found to be 

cachectic With a respiratory rate of 26.  Laboratory analysis demonstrated a 

hemoglobin of 8.5 (up from 7.8 on 12/22/22).  BUN 21, creatinine 1.07.  

Urinalysis was contaminated and patient without complaints of dysuria.  

Influenza A/B/RSV/COVID-19 testing were negative.  Chest x-ray showed right 

lower lobe pneumonia with right pleural effusion.  She underwent CT of the chest

which showed cardiomegaly, moderate pulmonary infiltrates and atelectasis in the

right middle and lower lobes with a moderate right pleural effusion and no 

evidence of pulmonary embolism.  She was given a dose of vancomycin and cefepime

in the ER for probable pneumonia.  Arrangements were made for admission. 

Pulmonary saw the patient and agreed with antibiotics.  Patient's calcitonin 

then came back at 0.06 and she was subsequently transitioned back to Levaquin as

it appeared her outpatient antibiotics were effective and she just was awaiting 

resolution of changes on computed tomography scan. She did have a drop in Hgb 

and plan is for EGD and Colonoscopy on 1/2/22. 





Of note the patient was here on 12/22 and was transferred to [facility] 

secondary to probable GI bleed.  She reports that they did not do a scope there 

but she had a scope earlier this year with an upper and lower.  They have 

recommended a pill endoscopy which she has not yet completed.  She reports she 

was diagnosed with pneumonia there and was taking an antibiotic and felt better 

for a few days and then again felt worse.  She has a pulmonologist that she was 

seen a couple of times on the [place] area and a cardiologist out of [place]. 

She is here visiting her son for the holidays.





Patient underwent colonoscopy which showed internal hemorrhoids, diverticulosis.

 Her hemoglobin remained stable.





Patient was treated with Levaquin for concerns of community acquired pneumonia. 

Her pro-calcitonin was negative.  Sputum culture showed Candida.  She was also 

given DuoNeb scheduled and as needed along with Solu-Medrol, Pulmicort and 

Mucinex.  Pulmonology followed the patient during her hospitalization.





Patient was seen and examined.  No acute events overnight.  Patient currently on

4 L.  She reports improvement in her breathing.  States that she is back to 

baseline.  Requesting to be discharged home.





Pertinent studies include chest x-ray, chest CTA


Pertinent procedures include colonoscopy





General: nontoxic, no distress, appears older than stated age, obese


Derm: warm, dry


Head: atraumatic, normocephalic, symmetric


Eyes: EOMI, no lid lag, anicteric sclera


Mouth: no lip lesion, mucus membranes moist


Cardiovascular: S1S2 irreg and tachycardiac, no murmur


Lungs: Decreased breath sounds bilateral, no rhonchi, no rales , no accessory 

muscle use


Abdominal: soft,  nontender to palpation, no guarding, no appreciable 

organomegaly


Ext: no gross muscle atrophy, no edema, no contractures


Neuro: no focal neuro deficits


Psych: Alert, oriented, appropriate affect 





Discharge diagnosis:





#Pneumonia, failed outpatient treatment


#Chronic Respiratory with hypoxia, 4L 


#Acute exacerbation of COPD, mild 


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 


#Anemia, Iron deficiency


#Possible GI bleed 


#HTN, controlled


#Compensated diastolic CHF 





This complex discharge took 35 minutes to complete.





Patient be discharged home with the following instructions:





Diet: Cardiac


FU PCP within 1-2 days of DC


FU with Pulmonology within 1 week of DC


FU with Surgery within 1 week of DC


Take all medications as advised


Patient Condition at Discharge: Stable





Plan - Discharge Summary


Discharge Rx Participant: Yes


New Discharge Prescriptions: 


New


   RX: Levofloxacin [Levaquin] 750 mg PO DAILY #4 tab


   RX: witch hazeL [Tucks Medicated Pads] 1 each TOPICAL BID  pad


   methylPREDNISolone Dose Pack [Medrol Dose Pack] 4 mg PO AS DIRECTED #1 packet





Continue


   RX: Pantoprazole Sodium [Protonix] 40 mg PO BID


   RX: Albuterol Sulfate [Ventolin HFA] 2 puff INHALATION RT-Q6H PRN


     PRN Reason: Shortness Of Breath


   RX: busPIRone HCl [Buspar] 10 mg PO TID PRN


     PRN Reason: Anxiety


   RX: Biotin 5 mg PO DAILY


   RX: Potassium Chloride [Klor-Con M10] 10 meq PO BID


   RX: Metoprolol Succinate [Toprol XL] 200 mg PO DAILY


   RX: Isosorbide Mononitrate ER [Imdur] 30 mg PO DAILY


   RX: Escitalopram [Lexapro] 10 mg PO DAILY


   RX: Dicyclomine [Bentyl] 10 mg PO BID PRN


     PRN Reason: adbom. cramping


   RX: Dapagliflozin Propanediol [Farxiga] 10 mg PO DAILY


   RX: Cholecalciferol [Vitamin D3 (25 Mcg = 1000 Iu)] 25 mcg PO DAILY


   RX: Bumetanide [BUMEX] 2 mg PO BID


   RX: Sucralfate [Carafate] 1 gm PO ACHS


   RX: Zinc Gluconate [Zinc] 50 mg PO DAILY


   RX: Magnesium 250 mg PO DAILY


   RX: Cyanocobalamin [Vitamin B-12] 500 mcg PO DAILY


   RX: Aspirin EC [Ecotrin Low Dose] 81 mg PO DAILY


   RX: Mirtazapine [Remeron] 45 mg PO HS


   RX: Losartan Potassium [Cozaar] 25 mg PO DAILY


   RX: Ipratropium-Albuterol Nebulize [Duoneb 0.5 mg-3 mg/3 ml Soln] 3 ml 

INHALATION RT-Q6H PRN


     PRN Reason: Shortness Of Breath


   RX: Clopidogrel [Plavix] 75 mg PO DAILY


   RX: Spironolactone [Aldactone] 25 mg PO DAILY


   RX: Rosuvastatin [Crestor] 20 mg PO DAILY


   RX: Fluticasone/Umeclidin/Vilanter [Trelegy Ellipta 100-62.5-25] 1 puff 

INHALATION RT-DAILY


   RX: dilTIAZem HCL [Tiadylt ER] 300 mg PO DAILY


   RX: ALPRAZolam [Xanax] 0.5 mg PO DAILY PRN #3 tab


     PRN Reason: Anxiety





Discontinued


   Levofloxacin [Levaquin] 750 mg PO DAILY


Discharge Medication List





RX: Albuterol Sulfate [Ventolin HFA] 2 puff INHALATION RT-Q6H PRN 12/22/20 

[History]


RX: Pantoprazole Sodium [Protonix] 40 mg PO BID 12/22/20 [History]


RX: busPIRone HCl [Buspar] 10 mg PO TID PRN 12/22/20 [History]


RX: Aspirin EC [Ecotrin Low Dose] 81 mg PO DAILY 12/22/22 [History]


RX: Biotin 5 mg PO DAILY 12/22/22 [History]


RX: Bumetanide [BUMEX] 2 mg PO BID 12/22/22 [History]


RX: Cholecalciferol [Vitamin D3 (25 Mcg = 1000 Iu)] 25 mcg PO DAILY 12/22/22 

[History]


RX: Clopidogrel [Plavix] 75 mg PO DAILY 12/22/22 [History]


RX: Cyanocobalamin [Vitamin B-12] 500 mcg PO DAILY 12/22/22 [History]


RX: Dapagliflozin Propanediol [Farxiga] 10 mg PO DAILY 12/22/22 [History]


RX: Dicyclomine [Bentyl] 10 mg PO BID PRN 12/22/22 [History]


RX: Escitalopram [Lexapro] 10 mg PO DAILY 12/22/22 [History]


RX: Fluticasone/Umeclidin/Vilanter [Trelegy Ellipta 100-62.5-25] 1 puff 

INHALATION RT-DAILY 12/22/22 [History]


RX: Ipratropium-Albuterol Nebulize [Duoneb 0.5 mg-3 mg/3 ml Soln] 3 ml 

INHALATION RT-Q6H PRN 12/22/22 [History]


RX: Isosorbide Mononitrate ER [Imdur] 30 mg PO DAILY 12/22/22 [History]


RX: Losartan Potassium [Cozaar] 25 mg PO DAILY 12/22/22 [History]


RX: Magnesium 250 mg PO DAILY 12/22/22 [History]


RX: Metoprolol Succinate [Toprol XL] 200 mg PO DAILY 12/22/22 [History]


RX: Mirtazapine [Remeron] 45 mg PO HS 12/22/22 [History]


RX: Potassium Chloride [Klor-Con M10] 10 meq PO BID 12/22/22 [History]


RX: Rosuvastatin [Crestor] 20 mg PO DAILY 12/22/22 [History]


RX: Spironolactone [Aldactone] 25 mg PO DAILY 12/22/22 [History]


RX: Sucralfate [Carafate] 1 gm PO ACHS 12/22/22 [History]


RX: Zinc Gluconate [Zinc] 50 mg PO DAILY 12/22/22 [History]


RX: dilTIAZem HCL [Tiadylt ER] 300 mg PO DAILY 12/29/22 [History]


RX: ALPRAZolam [Xanax] 0.5 mg PO DAILY PRN #3 tab 01/02/23 [Rx]


RX: Levofloxacin [Levaquin] 750 mg PO DAILY #4 tab 01/02/23 [Rx]


RX: witch hazeL [Tucks Medicated Pads] 1 each TOPICAL BID  pad 01/02/23 [Rx]


methylPREDNISolone Dose Pack [Medrol Dose Pack] 4 mg PO AS DIRECTED #1 packet 

01/02/23 [Rx]








Follow up Appointment(s)/Referral(s): 


Amy Stubbs MD [STAFF PHYSICIAN] - 1 Week (Office is closed at time of 

discharge. Please call for a appointment.)


Akil King DO [Doctor of Osteopathic Medicine] - 1 Week (Office is closed at

time of discharge. Please call for a appointment.)


None,Stated [Primary Care Provider] - 1-2 days


Patient Instructions/Handouts:  Pneumonia (DC)


Activity/Diet/Wound Care/Special Instructions: 


Diet: Cardiac


FU PCP within 1-2 days of DC


FU with Pulmonology within 1 week of DC


FU with Surgery within 1 week of DC


Take all medications as advised


Discharge Disposition: HOME SELF-CARE

## 2023-01-02 NOTE — P.PCN
Date of Procedure: 01/02/23


Description of Procedure: 








PREOPERATIVE DIAGNOSIS:


Gastrointestinal bleeding 


Anemia 





POSTOPERATIVE DIAGNOSIS:


Sigmoid diverticulosis without active bleeding


Grade 4 internal hemorrhoids without bleeding 





OPERATION:


Colonoscopy to the cecum, ileocecal valve and appendiceal orifice





SURGEON: Amy Stubbs MD.





ANESTHESIA: MAC.





INDICATIONS:


The patient is a 67-year-old female who presents with gastrointestinal bleeding 

and anemia.  Benefits and risks were described and informed consent was obtained

.





DESCRIPTION OF PROCEDURE:


The patient had undergone milk of magnesia, lactulose and Golytely prep 2 L.  

The patient had been brought into the operating room and laid in the left 

lateral decubitus position. After adequate intravenous sedation, the rectum was 

examined with 2% lidocaine jelly.  Large nonreducible grade 4 internal 

hemorrhoids with edema and was found.   An Olympus colonoscope was  gently 

advanced to the cecum with clear visualization of the ileocecal valve including 

appendiceal orifice.  The prep was fair with retained stool of the ascending 

colon. Moderate  sigmoid  diverticulosis without active bleeding.  No active 

colonic bleeding was found.  No intraluminal masses were identified within the 

colon. No large colonic polyps were found; however limited by stool.  No evidenc

e of focal colitis was found. Retroflexion of the scope demonstrated grade 4 

internal hemorrhoids with edema and no active bleeding. The colon was 

desufflated. The patient had tolerated the procedure well. 





Withdrawal time was over 6 minutes.





FINDINGS:


Aronchick preparation quality scale 3 (1-5)


Internal hemorrhoids, grade 4 with edema without bleeding 


No thrombosed hemorrhoid identified. 


No large adenomatous polyps.


No focal colitis.


Moderate sigmoid diverticulosis without active bleeding 





RECOMMENDATIONS:


1.  Diet as tolerated 


2.  Recommend 25-30 g of fiber for sigmoid diverticulosis





Plan - Discharge Summary


Discharge Rx Participant: Yes


New Discharge Prescriptions: 


No Action


   Pantoprazole Sodium [Protonix] 40 mg PO BID


   Albuterol Sulfate [Ventolin HFA] 2 puff INHALATION RT-Q6H PRN


     PRN Reason: Shortness Of Breath


   busPIRone HCl [Buspar] 10 mg PO TID PRN


     PRN Reason: Anxiety


   Biotin 5 mg PO DAILY


   Potassium Chloride [Klor-Con M10] 10 meq PO BID


   Metoprolol Succinate [Toprol XL] 200 mg PO DAILY


   Isosorbide Mononitrate ER [Imdur] 30 mg PO DAILY


   Escitalopram [Lexapro] 10 mg PO DAILY


   Dicyclomine [Bentyl] 10 mg PO BID PRN


     PRN Reason: adbom. cramping


   Dapagliflozin Propanediol [Farxiga] 10 mg PO DAILY


   Cholecalciferol [Vitamin D3 (25 Mcg = 1000 Iu)] 25 mcg PO DAILY


   Bumetanide [BUMEX] 2 mg PO BID


   ALPRAZolam [Xanax] 0.5 mg PO DAILY PRN


     PRN Reason: Anxiety


   Sucralfate [Carafate] 1 gm PO ACHS


   Zinc Gluconate [Zinc] 50 mg PO DAILY


   Magnesium 250 mg PO DAILY


   Cyanocobalamin [Vitamin B-12] 500 mcg PO DAILY


   Aspirin EC [Ecotrin Low Dose] 81 mg PO DAILY


   Mirtazapine [Remeron] 45 mg PO HS


   Losartan Potassium [Cozaar] 25 mg PO DAILY


   Ipratropium-Albuterol Nebulize [Duoneb 0.5 mg-3 mg/3 ml Soln] 3 ml INHALATION

RT-Q6H PRN


     PRN Reason: Shortness Of Breath


   Clopidogrel [Plavix] 75 mg PO DAILY


   Spironolactone [Aldactone] 25 mg PO DAILY


   Rosuvastatin [Crestor] 20 mg PO DAILY


   Fluticasone/Umeclidin/Vilanter [Trelegy Ellipta 100-62.5-25] 1 puff 

INHALATION RT-DAILY


   dilTIAZem HCL [Tiadylt ER] 300 mg PO DAILY


   Levofloxacin [Levaquin] 750 mg PO DAILY


Discharge Medication List





Albuterol Sulfate [Ventolin HFA] 2 puff INHALATION RT-Q6H PRN 12/22/20 [History]


Pantoprazole Sodium [Protonix] 40 mg PO BID 12/22/20 [History]


busPIRone HCl [Buspar] 10 mg PO TID PRN 12/22/20 [History]


ALPRAZolam [Xanax] 0.5 mg PO DAILY PRN 12/22/22 [History]


Aspirin EC [Ecotrin Low Dose] 81 mg PO DAILY 12/22/22 [History]


Biotin 5 mg PO DAILY 12/22/22 [History]


Bumetanide [BUMEX] 2 mg PO BID 12/22/22 [History]


Cholecalciferol [Vitamin D3 (25 Mcg = 1000 Iu)] 25 mcg PO DAILY 12/22/22 

[History]


Clopidogrel [Plavix] 75 mg PO DAILY 12/22/22 [History]


Cyanocobalamin [Vitamin B-12] 500 mcg PO DAILY 12/22/22 [History]


Dapagliflozin Propanediol [Farxiga] 10 mg PO DAILY 12/22/22 [History]


Dicyclomine [Bentyl] 10 mg PO BID PRN 12/22/22 [History]


Escitalopram [Lexapro] 10 mg PO DAILY 12/22/22 [History]


Fluticasone/Umeclidin/Vilanter [Trelegy Ellipta 100-62.5-25] 1 puff INHALATION 

RT-DAILY 12/22/22 [History]


Ipratropium-Albuterol Nebulize [Duoneb 0.5 mg-3 mg/3 ml Soln] 3 ml INHALATION 

RT-Q6H PRN 12/22/22 [History]


Isosorbide Mononitrate ER [Imdur] 30 mg PO DAILY 12/22/22 [History]


Losartan Potassium [Cozaar] 25 mg PO DAILY 12/22/22 [History]


Magnesium 250 mg PO DAILY 12/22/22 [History]


Metoprolol Succinate [Toprol XL] 200 mg PO DAILY 12/22/22 [History]


Mirtazapine [Remeron] 45 mg PO HS 12/22/22 [History]


Potassium Chloride [Klor-Con M10] 10 meq PO BID 12/22/22 [History]


Rosuvastatin [Crestor] 20 mg PO DAILY 12/22/22 [History]


Spironolactone [Aldactone] 25 mg PO DAILY 12/22/22 [History]


Sucralfate [Carafate] 1 gm PO ACHS 12/22/22 [History]


Zinc Gluconate [Zinc] 50 mg PO DAILY 12/22/22 [History]


Levofloxacin [Levaquin] 750 mg PO DAILY 12/29/22 [History]


dilTIAZem HCL [Tiadylt ER] 300 mg PO DAILY 12/29/22 [History]








Follow up Appointment(s)/Referral(s): 


None,Stated [Primary Care Provider] - 1-2 days

## 2023-01-02 NOTE — P.PN
Subjective


Progress Note Date: 01/02/23





67-year-old female brought into the emergency room on December 29, by EMS, for 

difficulty breathing/shortness of breath.  The patient was recently in the 

hospital, and was discharged, and came back in, for progressive and worsening 

shortness of breath, cough, chest congestion, and generally not feeling well.  

The patient was seen, evaluated, and admitted with a diagnosis of right middle 

lobe, and right lower lobe pneumonia.  She is seen in room 16 in the intensive 

care unit.  She does have COPD from previous heavy tobacco use.  She smoked for 

at least 40 years.  Currently, she is on 4 L of oxygen.  No IV fluids.  She was 

placed on cefepime and vancomycin.  We added Solu-Medrol, Pulmicort, and form

oterol.  Her CT angiogram was negative for pulmonary embolism.  White count 6.9,

hemoglobin 8.6, hematocrit 29.9, and platelet count 265,000.  D-dimer was 2.55. 

Sodium 136, potassium 4.6, chlorides 98, CO2 30, BUN 21, and creatinine 1.07.  

The urine was suggestive of a possible urinary tract infection.  Testing for 

influenza A, and influenza B, RSV, and coronavirus, were all negative.  Chest 

x-rays consistent with a small right-sided effusion, and right lower lobe 

pneumonia.  CT angiogram showed evidence of cardiomegaly, as well as right 

middle lobe and right lower lobe pneumonia, and a moderate right-sided pleural 

effusion.





Progress note dated 12/30/2022.





67-year-old female seen yesterday in the emergency room, shortness of breath, 

cough, chest congestion, and possible pneumonia involving the right middle lobe 

and right lower lobe.  Currently, the patient is in bed, resting, on 4 L.  No 

respiratory distress.  She states that she does not feel any better today than 

she did yesterday.  White count 6.24, hemoglobin 7.7, hematocrit 28.6, and 

platelet count 282,000.  Sodium 136, potassium 4.7, chlorides 98, CO2 29, BUN 

16, and creatinine 1.  Pro-calcitonin level is surprisingly low at 0.06.  She 

does appear to have a urinary tract infection.  She tested negative for 

influenza A, and B, as well as RSV, and coronavirus.  She is currently on 

cefepime and Levaquin.





Progress note dated 12/31/2022.





67-year-old female seen 2 days ago in the emergency department.  She was 

admitted with pneumonia, and had symptoms of shortness of breath, cough, chest 

congestion.  She seen today in room 473.  She's feeling much better today.  

She's currently on 4 L of oxygen.  She's not receiving any IV fluids.  She is 

getting Levaquin.  White count 14.2, hemoglobin 7.8, hematocrit 28.2, and ricardo

telet count 293,000.  Sodium 135, potassium 4.7, chlorides 99, CO2 27, BUN 19, 

creatinine 1.  Most recent pro-calcitonin is 0.06.





Progress note dated 01/01/2023.





67-year-old female, who was admitted with a diagnosis of pneumonia.  She remains

on oxygen at 4 L.  She is resting comfortably, and feeling better.  She is seen 

today in room 473.  White count is 10.1, hemoglobin 8.3, hematocrit 29.9, and 

platelet count was 177,000.  Sodium 135, potassium 4.5, chlorides 100, CO2 29, 

BUN 22, and creatinine 0.96.  Chest x-ray today, shows improvement in her right 

basilar airspace opacities.





01/02/2023 This a pleasant 67-year-old female patient.  She is awake and alert. 

Maintaining O2 saturations in the 90s on 4 L/m per nasal cannula.  Culture 

positive for Candida only.  Urine culture revealed no growth.  Blood sugar 181. 

She's been maintained on DuoNeb inhalations Pulmicort and Perforomist in

halations, IV Solu-Medrol.  Remained on IV diuretics.  Heparin for DVT 

prophylaxis.  She did undergo EGD/colonoscopy today.  She was found to have 

gastritis without active bleeding, presbyesophagus, sigmoid diverticulosis 

without active bleeding, grade 4 internal hemorrhoids without bleeding.





Objective





- Vital Signs


Vital signs: 


                                   Vital Signs











Temp  97.7 F   01/02/23 08:00


 


Pulse  96   01/02/23 12:37


 


Resp  21   01/02/23 08:00


 


BP  125/73   01/02/23 08:00


 


Pulse Ox  94 L  01/02/23 08:00


 


FiO2      








                                 Intake & Output











 01/01/23 01/02/23 01/02/23





 18:59 06:59 18:59


 


Intake Total   200


 


Balance   200


 


Intake:   


 


  IV   200


 


Other:   


 


  Voiding Method  Bedside Commode Bedside Commode


 


  # Voids 4 4 














- Exam








No acute distress, oriented 3.  The patient is much more awake today and alert,

and not having much in way of respiratory issues.


HEENT examination is grossly unremarkable.  


Neck supple.  Full range of motion.  No adenopathy thyromegaly or neck vein d

istention.


Cardiovascular examination reveals regular rhythm rate.  S1-S2 normal.  No S3 or

S4.  No discernible murmur noted.    Heart sounds are distant.


Lungs reveal diffuse bilateral rhonchi.  Expiratory wheezes are noted.  No 

crackles.  Breath sounds are improved.  Breath sounds are diminished at the 

right lung base.  .


Abdomen soft bowel sounds are heard.  No masses or tenderness.


Extremities are intact.  No cyanosis clubbing or edema.


Skin is without rash or lesion.


Neurologic examination is brief but nonfocal.





- Labs


CBC & Chem 7: 


                                 01/01/23 06:47





                                 01/01/23 06:47


Labs: 


                  Abnormal Lab Results - Last 24 Hours (Table)











  01/01/23 01/01/23 01/01/23 Range/Units





  06:47 17:24 21:33 


 


POC Glucose (mg/dL)   193 H  241 H  ()  mg/dL


 


Hemoglobin A1c  6.3 H    (0.0-6.0)  %














  01/02/23 01/02/23 Range/Units





  06:11 12:46 


 


POC Glucose (mg/dL)  186 H  181 H  ()  mg/dL


 


Hemoglobin A1c    (0.0-6.0)  %














Assessment and Plan


Assessment: 





Acute hypoxemic respiratory failure, secondary to right middle lobe and right 

lower lobe pneumonia, and acute COPD exacerbation.





Anemia, she did undergo EGD/colonoscopy today 01/02/2023.  She was found to have

gastritis without active bleeding, presbyesophagus, sigmoid diverticulosis 

without active bleeding, grade 4 internal hemorrhoids without bleeding.





Previous history of heavy tobacco use.





Possible urinary tract infection.





History of atrial fibrillation.





History of congestive heart failure.





History of hyperlipidemia.





History of diabetes mellitus.





History of hypertension.





Obesity.





Multiple other medical problems and comorbidities.





Plan: 





The patient was seen and evaluated


Currently stable from the pulmonary standpoint


EGD/colonoscopy review


Upon discharge she could continue her home pulmonary medications including 

Trelegy, DuoNeb inhalations


Complete a prednisone taper


Complete course of antibiotics


Follow-up in the office in 1 week





I have personally seen and examined the patient, performed the documentation and

the assessment and plan as written.  Number of minutes spent on the visit: 10.

## 2023-01-02 NOTE — P.PCN
Date of Procedure: 01/02/23


Description of Procedure: 








PREOPERATIVE DIAGNOSIS:


Acute gastrointestinal bleeding


Anemia


Congestive heart failure


Atrial fibrillation





POSTOPERATIVE DIAGNOSIS:


Gastritis without active bleeding


Presbyesophagus





OPERATION:


Esophagogastroduodenoscopy 





SURGEON: Amy Stubbs MD





ANESTHESIA: MAC.





INDICATIONS:


The patient is a 67-year-old female who presents with gastrointestinal bleeding 

and anemia. Benefits and risks of the procedure were described. Informed consent

was obtained.





DESCRIPTION:


The patient was brought into the endoscopy suite and laid in the left lateral 

decubitus position. An Olympus gastroscope was passed along the posterior 

oropharynx down to the distal esophagus where the squamocolumnar junction was 

encountered at 40 cm from the incisors. The stomach was entered and no bile 

reflux was found.  Additional findings are listed below. The first through third

portion of the duodenum was examined and unremarkable. Retroflexion of the scope

confirmed  Hill grade 2 lower esophageal valve. The squamocolumnar junction 

demonstrated LA grade A erosive esophagitis. The stomach was desufflated. The 

patient tolerated the procedure well.





FINDINGS:


Squamocolumnar junction 40 cm from the incisors.


Diaphragmatic hiatus at 40 cm.


Hill grade 1 lower esophageal valve.


LA grade A erosive esophagitis.


Gastritis without active bleeding


Moderate tertiary contractions of the esophagus consistent with presbyesophagus





RECOMMENDATIONS:


1.  Recommend dysphagia diet due to presbyesophagus


2.  Upper endoscopy as needed

## 2023-06-15 ENCOUNTER — OFFICE (OUTPATIENT)
Dept: URBAN - NONMETROPOLITAN AREA CLINIC 13 | Facility: CLINIC | Age: 68
End: 2023-06-15

## 2023-06-15 VITALS
DIASTOLIC BLOOD PRESSURE: 75 MMHG | OXYGEN SATURATION: 94 % | HEIGHT: 64 IN | DIASTOLIC BLOOD PRESSURE: 76 MMHG | SYSTOLIC BLOOD PRESSURE: 149 MMHG | WEIGHT: 192 LBS | HEART RATE: 78 BPM | SYSTOLIC BLOOD PRESSURE: 142 MMHG

## 2023-06-15 VITALS — HEIGHT: 64 IN

## 2023-06-15 DIAGNOSIS — K76.0 FATTY (CHANGE OF) LIVER, NOT ELSEWHERE CLASSIFIED: ICD-10-CM

## 2023-06-15 DIAGNOSIS — K64.9 UNSPECIFIED HEMORRHOIDS: ICD-10-CM

## 2023-06-15 PROCEDURE — 99213 OFFICE O/P EST LOW 20 MIN: CPT | Mod: 25 | Performed by: NURSE PRACTITIONER

## 2023-06-15 PROCEDURE — 76705 ECHO EXAM OF ABDOMEN: CPT | Mod: TC | Performed by: NURSE PRACTITIONER

## 2023-06-15 RX ORDER — HYDROCORTISONE 25 MG/G
CREAM TOPICAL
Qty: 30 | Refills: 1 | Status: COMPLETED
Start: 2023-06-15 | End: 2023-12-12

## 2023-08-03 ENCOUNTER — OFFICE (OUTPATIENT)
Dept: URBAN - NONMETROPOLITAN AREA CLINIC 13 | Facility: CLINIC | Age: 68
End: 2023-08-03

## 2023-08-03 VITALS — HEIGHT: 64 IN

## 2023-08-03 DIAGNOSIS — K64.9 UNSPECIFIED HEMORRHOIDS: ICD-10-CM

## 2023-08-03 PROCEDURE — 82274 ASSAY TEST FOR BLOOD FECAL: CPT | Mod: QW | Performed by: NURSE PRACTITIONER

## 2023-12-12 ENCOUNTER — OFFICE (OUTPATIENT)
Dept: URBAN - NONMETROPOLITAN AREA CLINIC 13 | Facility: CLINIC | Age: 68
End: 2023-12-12

## 2023-12-12 VITALS
DIASTOLIC BLOOD PRESSURE: 85 MMHG | SYSTOLIC BLOOD PRESSURE: 159 MMHG | OXYGEN SATURATION: 94 % | SYSTOLIC BLOOD PRESSURE: 187 MMHG | HEART RATE: 65 BPM | DIASTOLIC BLOOD PRESSURE: 86 MMHG | WEIGHT: 194 LBS | HEIGHT: 64 IN

## 2023-12-12 DIAGNOSIS — K92.1 MELENA: ICD-10-CM

## 2023-12-12 DIAGNOSIS — K76.0 FATTY (CHANGE OF) LIVER, NOT ELSEWHERE CLASSIFIED: ICD-10-CM

## 2023-12-12 DIAGNOSIS — K57.90 DIVERTICULOSIS OF INTESTINE, PART UNSPECIFIED, WITHOUT PERFO: ICD-10-CM

## 2023-12-12 LAB
CBC WITH WBC (DIFF): ACANTHOCYTE: NORMAL
CBC WITH WBC (DIFF): AGRANULAR NEUTROPHIL: NORMAL
CBC WITH WBC (DIFF): ANISOCYTOSIS: NORMAL
CBC WITH WBC (DIFF): ATYPICAL LYMPHOCYTE: NORMAL
CBC WITH WBC (DIFF): AUER RODS: NORMAL
CBC WITH WBC (DIFF): BAND: NORMAL
CBC WITH WBC (DIFF): BASOPHIL: 0 % (ref 0–1)
CBC WITH WBC (DIFF): BASOPHILIC STIPPLING: NORMAL
CBC WITH WBC (DIFF): BI-LOBED NEUTROPHIL: NORMAL
CBC WITH WBC (DIFF): BLAST: NORMAL
CBC WITH WBC (DIFF): BURR CELL: NORMAL
CBC WITH WBC (DIFF): DIMORPHIC RBC POPULATION: NORMAL
CBC WITH WBC (DIFF): DOHLE BODIES: NORMAL
CBC WITH WBC (DIFF): ELLIPTOCYTE: NORMAL
CBC WITH WBC (DIFF): EOSINOPHIL: 2 % (ref 0–5)
CBC WITH WBC (DIFF): GIANT PLATELET: NORMAL
CBC WITH WBC (DIFF): HEMATOCRIT: 40.1 % (ref 36–46)
CBC WITH WBC (DIFF): HEMOGLOBIN: 13.1 G/DL (ref 12–16)
CBC WITH WBC (DIFF): HOWELL JOLLY BODIES: NORMAL
CBC WITH WBC (DIFF): HYPERSEGMENTED NEUTROPHIL: NORMAL
CBC WITH WBC (DIFF): HYPOCHROMIA: NORMAL
CBC WITH WBC (DIFF): HYPOGRANULAR NEUTROPHIL: NORMAL
CBC WITH WBC (DIFF): IMMATURE GRANULOCYTES: 0 % (ref 0–1)
CBC WITH WBC (DIFF): LARGE PLATELET: NORMAL
CBC WITH WBC (DIFF): LYMPHOCYTE: 38 % (ref 20–40)
CBC WITH WBC (DIFF): MACROCYTOSIS: NORMAL
CBC WITH WBC (DIFF): MEAN CELL HEMOGLOBIN CONCENTRATION: 32.7 G/DL — LOW (ref 33–37)
CBC WITH WBC (DIFF): MEAN CELL HEMOGLOBIN: 29.8 PG (ref 27–31)
CBC WITH WBC (DIFF): MEAN CELL VOLUME: 91 FL (ref 84–100)
CBC WITH WBC (DIFF): MEAN PLATELET VOLUME: 11.4 FL (ref 8.3–11.9)
CBC WITH WBC (DIFF): METAMYELOCYTE: NORMAL
CBC WITH WBC (DIFF): MICROCYTOSIS: NORMAL
CBC WITH WBC (DIFF): MIX CELLS: NORMAL
CBC WITH WBC (DIFF): MONOCYTE: 8 % (ref 1–10)
CBC WITH WBC (DIFF): MYELOCYTE: NORMAL
CBC WITH WBC (DIFF): NEUTROPHIL SEGMENTED: 51 % (ref 50–70)
CBC WITH WBC (DIFF): NOTE: NORMAL
CBC WITH WBC (DIFF): NUCLEATED RBC: 0 /100WBC (ref 0–0)
CBC WITH WBC (DIFF): OVALOCYTE: NORMAL
CBC WITH WBC (DIFF): PAPPENHEIMER BODIES: NORMAL
CBC WITH WBC (DIFF): PATHOLOGIST INTERPRETATION: NORMAL
CBC WITH WBC (DIFF): PLATELET CLUMPS: NORMAL
CBC WITH WBC (DIFF): PLATELET COUNT: 239 /NL (ref 140–440)
CBC WITH WBC (DIFF): PLT SATELLITOSIS: NORMAL
CBC WITH WBC (DIFF): POIKILOCYTOSIS: NORMAL
CBC WITH WBC (DIFF): POLYCHROMASIA: NORMAL
CBC WITH WBC (DIFF): PROMYELOCYTE: NORMAL
CBC WITH WBC (DIFF): RBC MORPHOLOGY: NORMAL
CBC WITH WBC (DIFF): REACT LYMPH ABS MAN: NORMAL
CBC WITH WBC (DIFF): REACTIVE LYMPHOCYTE: NORMAL
CBC WITH WBC (DIFF): RED BLOOD CELL: 4.39 /PL (ref 4.2–5.4)
CBC WITH WBC (DIFF): RED CELL DIAMETER WIDTH: 41 FL (ref 35–48)
CBC WITH WBC (DIFF): ROULEAUX RBC: NORMAL
CBC WITH WBC (DIFF): SCHISTOCYTE: NORMAL
CBC WITH WBC (DIFF): SICKLE CELL: NORMAL
CBC WITH WBC (DIFF): SMUDGE CELLS: NORMAL
CBC WITH WBC (DIFF): SPHEROCYTE: NORMAL
CBC WITH WBC (DIFF): STOMATOCYTE: NORMAL
CBC WITH WBC (DIFF): TARGET CELL: NORMAL
CBC WITH WBC (DIFF): TEAR DROP CELL: NORMAL
CBC WITH WBC (DIFF): TOXIC GRANULATION: NORMAL
CBC WITH WBC (DIFF): UNCLASSIFIED CELL: NORMAL
CBC WITH WBC (DIFF): VACUOLATED NEUTROPHIL: NORMAL
CBC WITH WBC (DIFF): WBC MORPHOLOGY: NORMAL
CBC WITH WBC (DIFF): WHITE BLOOD CELL: 7.5 /NL (ref 4.8–11)
COMPREHENSIVE METABOLIC PANEL: ALBUMIN: 4.8 G/DL (ref 3.5–4.8)
COMPREHENSIVE METABOLIC PANEL: ALKALINE PHOSPHATASE: 69 UNITS/L (ref 36–126)
COMPREHENSIVE METABOLIC PANEL: ALT: 28 UNITS/L (ref ?–34)
COMPREHENSIVE METABOLIC PANEL: ANION GAP: 7 MMOL/L (ref 7–16)
COMPREHENSIVE METABOLIC PANEL: AST: 32 UNITS/L (ref 14–36)
COMPREHENSIVE METABOLIC PANEL: BILIRUBIN, TOTAL: 0.6 MG/DL (ref 0.2–1.3)
COMPREHENSIVE METABOLIC PANEL: BUN/CREATININE RATIO: 28.1
COMPREHENSIVE METABOLIC PANEL: CALCIUM: 9.5 MG/DL (ref 8.4–10.2)
COMPREHENSIVE METABOLIC PANEL: CARBON DIOXIDE: 29 MMOL/L (ref 22–30)
COMPREHENSIVE METABOLIC PANEL: CHLORIDE: 107 MMOL/L (ref 98–107)
COMPREHENSIVE METABOLIC PANEL: CREATININE: 0.57 MG/DL (ref 0.52–1.04)
COMPREHENSIVE METABOLIC PANEL: GLUCOSE: 91 MG/DL (ref 65–105)
COMPREHENSIVE METABOLIC PANEL: POTASSIUM: 4.5 MMOL/L (ref 3.5–5.3)
COMPREHENSIVE METABOLIC PANEL: PROTEIN, TOTAL, SERUM: 6.9 G/DL (ref 6.3–8.2)
COMPREHENSIVE METABOLIC PANEL: SODIUM: 143 MMOL/L (ref 137–145)
COMPREHENSIVE METABOLIC PANEL: UREA NITROGEN (BUN): 16 MG/DL (ref 7–17)
GFR: EGFR AFRICAN AMERICAN: >60 ML/MIN/1.73M2
GFR: EGFR NON-AFR.AMERICAN: >60 ML/MIN/1.73M2

## 2023-12-12 PROCEDURE — 99213 OFFICE O/P EST LOW 20 MIN: CPT | Performed by: NURSE PRACTITIONER
